# Patient Record
Sex: FEMALE | Race: WHITE | HISPANIC OR LATINO | Employment: OTHER | URBAN - METROPOLITAN AREA
[De-identification: names, ages, dates, MRNs, and addresses within clinical notes are randomized per-mention and may not be internally consistent; named-entity substitution may affect disease eponyms.]

---

## 2017-01-27 ENCOUNTER — PREP FOR SURGERY (OUTPATIENT)
Dept: ORTHOPEDIC SURGERY | Facility: CLINIC | Age: 57
End: 2017-01-27

## 2017-01-27 DIAGNOSIS — Z96.649 FAILED TOTAL HIP ARTHROPLASTY, SEQUELA: Primary | ICD-10-CM

## 2017-01-27 DIAGNOSIS — T84.018S FAILED TOTAL HIP ARTHROPLASTY, SEQUELA: Primary | ICD-10-CM

## 2017-01-27 RX ORDER — CELECOXIB 100 MG/1
400 CAPSULE ORAL ONCE
Status: CANCELLED | OUTPATIENT
Start: 2017-01-27 | End: 2017-01-27

## 2017-01-27 RX ORDER — OXYCODONE HCL 10 MG/1
20 TABLET, FILM COATED, EXTENDED RELEASE ORAL ONCE
Status: CANCELLED | OUTPATIENT
Start: 2017-01-27 | End: 2017-01-27

## 2017-01-27 RX ORDER — ACETAMINOPHEN 10 MG/ML
1000 INJECTION, SOLUTION INTRAVENOUS ONCE
Status: CANCELLED | OUTPATIENT
Start: 2017-03-07

## 2017-01-27 RX ORDER — PREGABALIN 25 MG/1
150 CAPSULE ORAL ONCE
Status: CANCELLED | OUTPATIENT
Start: 2017-01-27 | End: 2017-01-27

## 2017-02-19 ENCOUNTER — PREP FOR SURGERY (OUTPATIENT)
Dept: ORTHOPEDIC SURGERY | Facility: CLINIC | Age: 57
End: 2017-02-19

## 2017-02-19 DIAGNOSIS — Z96.649 FAILED TOTAL HIP ARTHROPLASTY, INITIAL ENCOUNTER (HCC): Primary | ICD-10-CM

## 2017-02-19 DIAGNOSIS — T84.018A FAILED TOTAL HIP ARTHROPLASTY, INITIAL ENCOUNTER (HCC): Primary | ICD-10-CM

## 2017-02-19 RX ORDER — PANTOPRAZOLE SODIUM 20 MG/1
40 TABLET, DELAYED RELEASE ORAL ONCE
Status: CANCELLED | OUTPATIENT
Start: 2017-03-07

## 2017-02-19 RX ORDER — ACETAMINOPHEN 10 MG/ML
1000 INJECTION, SOLUTION INTRAVENOUS ONCE
Status: CANCELLED | OUTPATIENT
Start: 2017-03-07

## 2017-02-19 RX ORDER — CELECOXIB 100 MG/1
200 CAPSULE ORAL ONCE
Status: CANCELLED | OUTPATIENT
Start: 2017-03-07

## 2017-02-19 RX ORDER — PREGABALIN 25 MG/1
150 CAPSULE ORAL ONCE
Status: CANCELLED | OUTPATIENT
Start: 2017-02-19 | End: 2017-02-19

## 2017-02-19 RX ORDER — OXYCODONE HCL 10 MG/1
10 TABLET, FILM COATED, EXTENDED RELEASE ORAL ONCE
Status: CANCELLED | OUTPATIENT
Start: 2017-02-19 | End: 2017-02-19

## 2017-02-20 ENCOUNTER — PREP FOR SURGERY (OUTPATIENT)
Dept: ORTHOPEDIC SURGERY | Facility: CLINIC | Age: 57
End: 2017-02-20

## 2017-02-20 DIAGNOSIS — Z96.649 FAILED TOTAL HIP ARTHROPLASTY WITH DISLOCATION, INITIAL ENCOUNTER (HCC): Primary | ICD-10-CM

## 2017-02-20 DIAGNOSIS — T84.028A FAILED TOTAL HIP ARTHROPLASTY WITH DISLOCATION, INITIAL ENCOUNTER (HCC): Primary | ICD-10-CM

## 2017-02-28 RX ORDER — POTASSIUM CHLORIDE 20 MEQ/1
20 TABLET, EXTENDED RELEASE ORAL 2 TIMES DAILY
COMMUNITY

## 2017-02-28 RX ORDER — BUSPIRONE HYDROCHLORIDE 5 MG/1
5 TABLET ORAL 3 TIMES DAILY
COMMUNITY
End: 2020-02-12 | Stop reason: ALTCHOICE

## 2017-02-28 RX ORDER — ERGOCALCIFEROL 1.25 MG/1
50000 CAPSULE ORAL
COMMUNITY

## 2017-02-28 RX ORDER — OMEPRAZOLE 20 MG/1
20 CAPSULE, DELAYED RELEASE ORAL DAILY
COMMUNITY

## 2017-03-06 ENCOUNTER — PREP FOR SURGERY (OUTPATIENT)
Dept: ORTHOPEDIC SURGERY | Facility: CLINIC | Age: 57
End: 2017-03-06

## 2017-03-06 ENCOUNTER — OFFICE VISIT (OUTPATIENT)
Dept: ORTHOPEDIC SURGERY | Facility: CLINIC | Age: 57
End: 2017-03-06

## 2017-03-06 DIAGNOSIS — Z96.649 FAILED TOTAL HIP ARTHROPLASTY, SEQUELA: Primary | ICD-10-CM

## 2017-03-06 DIAGNOSIS — Z96.649 FAILED TOTAL HIP ARTHROPLASTY WITH DISLOCATION, INITIAL ENCOUNTER (HCC): Primary | ICD-10-CM

## 2017-03-06 DIAGNOSIS — T84.018S FAILED TOTAL HIP ARTHROPLASTY, SEQUELA: Primary | ICD-10-CM

## 2017-03-06 DIAGNOSIS — T84.028A FAILED TOTAL HIP ARTHROPLASTY WITH DISLOCATION, INITIAL ENCOUNTER (HCC): Primary | ICD-10-CM

## 2017-03-06 PROCEDURE — S0260 H&P FOR SURGERY: HCPCS | Performed by: ORTHOPAEDIC SURGERY

## 2017-03-06 NOTE — PROGRESS NOTES
Subjective: Left hip pain     Patient ID: Mariangel Thomas is a 56 y.o. female.    Chief Complaint:    History of Present Illness patient seen today to undergo revision of a left total hip arthroplasty tomorrow       Social History     Occupational History   • Not on file.     Social History Main Topics   • Smoking status: Never Smoker   • Smokeless tobacco: Never Used   • Alcohol use No   • Drug use: No   • Sexual activity: Defer      Review of Systems      Past Medical History   Diagnosis Date   • Anxiety    • Chronic respiratory failure    • COPD (chronic obstructive pulmonary disease)    • Disc disorder      lumbar/thoracic   • Fracture of hip      sched total hip revision   • GERD (gastroesophageal reflux disease)    • Kidney failure    • Lumbosacral disc disease    • OA (osteoarthritis)    • Psychosis    • Severe sepsis without septic shock      Past Surgical History   Procedure Laterality Date   • Total hip arthroplasty Left 12/2015     No family history on file.      Objective:  There were no vitals filed for this visit.  There were no vitals filed for this visit.  There is no height or weight on file to calculate BMI.       Ortho Exam  H&P completed    Assessment:       1. Failed total hip arthroplasty with dislocation, initial encounter          Plan:      all questions regarding the surgery answered      Work Status:    ITZEL query complete.    Orders:  No orders of the defined types were placed in this encounter.      Medications:  No orders of the defined types were placed in this encounter.      Followup:  Return in about 2 weeks (around 3/20/2017).          Dragon transcription disclaimer     Much of this encounter note is an electronic transcription/translation of spoken language to printed text. The electronic translation of spoken language may permit erroneous, or at times, nonsensical words or phrases to be inadvertently transcribed. Although I have reviewed the note for such errors, some may still  exist.

## 2017-03-06 NOTE — H&P
Orthopedic Surgery    Patient Care Team:  Antoine Talavera Jr., MD as PCP - General  Antoine Talavera Jr., MD as PCP - Family Medicine    CHIEF COMPLAINT: Left hip pain    HISTORY OF PRESENT ILLNESS: 56-year-old female presented at this time to undergo revision of a left total hip arthroplasty.  She underwent a primary total hip arthroplasty in December 2015 the gone onto develop a hip protrusio and is being admitted this time to undergo revision of her acetabulum with a custom acetabular cage.  Reviewed with the patient the risk of surgery which include but not limited to an infection and the need for multiple procedures including implant removal to eradicate infection, vascular injury, nerve injury, repeat protrusio, the need for revision surgery, limb length discrepancy and persistent pain and discomfort, hip dislocation and pain she understands and agrees to proceed.  His been seen and cleared by PCP for the said surgery.          Past Medical History   Diagnosis Date   • Anxiety    • Chronic respiratory failure    • COPD (chronic obstructive pulmonary disease)    • Disc disorder      lumbar/thoracic   • Fracture of hip      sched total hip revision   • GERD (gastroesophageal reflux disease)    • Kidney failure    • Lumbosacral disc disease    • OA (osteoarthritis)    • Psychosis    • Severe sepsis without septic shock      Past Surgical History   Procedure Laterality Date   • Total hip arthroplasty Left 12/2015     History reviewed. No pertinent family history.  Social History   Substance Use Topics   • Smoking status: Never Smoker   • Smokeless tobacco: Never Used   • Alcohol use No     No prescriptions prior to admission.     Allergies:  Aleve [naproxen sodium]; Amoxicillin; Bactrim [sulfamethoxazole-trimethoprim]; Keflex [cephalexin]; Lamisil [terbinafine hcl]; Levaquin [levofloxacin in d5w]; and Penicillins    REVIEW OF SYSTEMS:  Please see the above history of present illness for pertinent positives and  "negatives.  The remainder of the patient's systems have been reviewed and are negative.    Vital Signs       Flowsheet Rows         First Filed Value    Admission Height  67\" (170.2 cm) Documented at 03/06/2017 1020    Admission Weight  180 lb (81.6 kg) Documented at 03/06/2017 1020           Physical Exam:  Physical Exam   Constitutional: Patient appears well-developed and well-nourished and in no acute distress   HEENT:   Head: Normocephalic and atraumatic.   Eyes:  Pupils are equal, round, and reactive to light.  Mouth and Throat: Patient has moist mucous membranes. Oropharynx is clear of any erythema or exudate.     Neck: Neck supple. No JVD present. No thyromegaly present. No lymphadenopathy present.  Cardiovascular: Regular rate, regular rhythm.  Pulmonary/Chest: Lungs are clear to auscultation bilaterally.  Abdominal:benign,soft with bowel sounds  Musculoskeletal: Normal posture.  Extremities: There is no motor deficit good distal pulses low blood extremity.  There is shortening of that hip.  There is pain with passive range of motion.    Neurological: Patient is alert and oriented.  Psychological:   Mood and behavior appropriate.  Skin: Skin is warm and dry.     Results Review:    I reviewed the patient's new clinical results.  Lab Results (most recent)     None          Imaging Results (most recent)     None            ECG/EMG Results (most recent)     None          Assessment/Plan  x-ray show protrusio of the implant on the left hip        I discussed the patients findings and my recommendations with patient and revision left total hip arthroplasty    Piter Chavez MD  03/06/17  3:53 PM          "

## 2017-03-07 ENCOUNTER — HOSPITAL ENCOUNTER (OUTPATIENT)
Facility: HOSPITAL | Age: 57
Setting detail: HOSPITAL OUTPATIENT SURGERY
Discharge: NURSING, REHAB OR LONG-TERM CARE | End: 2017-03-07
Attending: ORTHOPAEDIC SURGERY | Admitting: HOSPITALIST

## 2017-03-07 VITALS
OXYGEN SATURATION: 88 % | WEIGHT: 210.6 LBS | BODY MASS INDEX: 33.06 KG/M2 | TEMPERATURE: 98.7 F | HEART RATE: 77 BPM | RESPIRATION RATE: 21 BRPM | SYSTOLIC BLOOD PRESSURE: 122 MMHG | DIASTOLIC BLOOD PRESSURE: 86 MMHG | HEIGHT: 67 IN

## 2017-03-07 DIAGNOSIS — T84.028A FAILED TOTAL HIP ARTHROPLASTY WITH DISLOCATION, INITIAL ENCOUNTER (HCC): ICD-10-CM

## 2017-03-07 DIAGNOSIS — T84.018S FAILED TOTAL HIP ARTHROPLASTY, SEQUELA: ICD-10-CM

## 2017-03-07 DIAGNOSIS — Z96.649 FAILED TOTAL HIP ARTHROPLASTY, INITIAL ENCOUNTER (HCC): ICD-10-CM

## 2017-03-07 DIAGNOSIS — T84.018A FAILED TOTAL HIP ARTHROPLASTY, INITIAL ENCOUNTER (HCC): ICD-10-CM

## 2017-03-07 DIAGNOSIS — Z96.649 FAILED TOTAL HIP ARTHROPLASTY WITH DISLOCATION, INITIAL ENCOUNTER (HCC): ICD-10-CM

## 2017-03-07 DIAGNOSIS — Z96.649 FAILED TOTAL HIP ARTHROPLASTY, SEQUELA: ICD-10-CM

## 2017-03-07 LAB
ABO GROUP BLD: NORMAL
ARTERIAL PATENCY WRIST A: POSITIVE
ATMOSPHERIC PRESS: 749.3 MMHG
BASE EXCESS BLDA CALC-SCNC: 3.7 MMOL/L (ref -2.3–2.3)
BDY SITE: ABNORMAL
BLD GP AB SCN SERPL QL: NEGATIVE
HCO3 BLDA-SCNC: 29.3 MMOL/L (ref 22–26)
HGB BLDA-MCNC: 12.1 G/DL (ref 12–18)
MODALITY: ABNORMAL
PCO2 BLDA: 48.7 MM HG (ref 35–45)
PH BLDA: 7.4 PH UNITS (ref 7.35–7.45)
PO2 BLDA: 60.7 MM HG (ref 80–100)
POTASSIUM BLD-SCNC: 4.6 MMOL/L (ref 3.5–5.2)
PULSE OX: 88 %
RH BLD: POSITIVE
SAO2 % BLDCOA: 90.7 % (ref 91–100)
TOTAL RATE: 21 BREATHS/MINUTE

## 2017-03-07 PROCEDURE — 84132 ASSAY OF SERUM POTASSIUM: CPT | Performed by: ORTHOPAEDIC SURGERY

## 2017-03-07 PROCEDURE — 25010000002 VANCOMYCIN 750 MG RECONSTITUTED SOLUTION 1 EACH VIAL: Performed by: ORTHOPAEDIC SURGERY

## 2017-03-07 PROCEDURE — 86850 RBC ANTIBODY SCREEN: CPT | Performed by: ORTHOPAEDIC SURGERY

## 2017-03-07 PROCEDURE — 82803 BLOOD GASES ANY COMBINATION: CPT | Performed by: NURSE ANESTHETIST, CERTIFIED REGISTERED

## 2017-03-07 PROCEDURE — 36600 WITHDRAWAL OF ARTERIAL BLOOD: CPT | Performed by: NURSE ANESTHETIST, CERTIFIED REGISTERED

## 2017-03-07 PROCEDURE — 86900 BLOOD TYPING SEROLOGIC ABO: CPT | Performed by: ORTHOPAEDIC SURGERY

## 2017-03-07 PROCEDURE — 94640 AIRWAY INHALATION TREATMENT: CPT

## 2017-03-07 PROCEDURE — 86901 BLOOD TYPING SEROLOGIC RH(D): CPT | Performed by: ORTHOPAEDIC SURGERY

## 2017-03-07 RX ORDER — MIDAZOLAM HYDROCHLORIDE 1 MG/ML
1 INJECTION INTRAMUSCULAR; INTRAVENOUS
Status: DISCONTINUED | OUTPATIENT
Start: 2017-03-07 | End: 2017-03-07 | Stop reason: HOSPADM

## 2017-03-07 RX ORDER — MIDAZOLAM HYDROCHLORIDE 1 MG/ML
2 INJECTION INTRAMUSCULAR; INTRAVENOUS
Status: DISCONTINUED | OUTPATIENT
Start: 2017-03-07 | End: 2017-03-07 | Stop reason: HOSPADM

## 2017-03-07 RX ORDER — ONDANSETRON 2 MG/ML
4 INJECTION INTRAMUSCULAR; INTRAVENOUS ONCE AS NEEDED
Status: DISCONTINUED | OUTPATIENT
Start: 2017-03-07 | End: 2017-03-07 | Stop reason: HOSPADM

## 2017-03-07 RX ORDER — PREGABALIN 75 MG/1
CAPSULE ORAL
Status: COMPLETED
Start: 2017-03-07 | End: 2017-03-07

## 2017-03-07 RX ORDER — FAMOTIDINE 10 MG/ML
20 INJECTION, SOLUTION INTRAVENOUS
Status: DISCONTINUED | OUTPATIENT
Start: 2017-03-07 | End: 2017-03-07 | Stop reason: HOSPADM

## 2017-03-07 RX ORDER — PREGABALIN 25 MG/1
150 CAPSULE ORAL ONCE
Status: COMPLETED | OUTPATIENT
Start: 2017-03-07 | End: 2017-03-07

## 2017-03-07 RX ORDER — SODIUM CHLORIDE 0.9 % (FLUSH) 0.9 %
1-10 SYRINGE (ML) INJECTION AS NEEDED
Status: DISCONTINUED | OUTPATIENT
Start: 2017-03-07 | End: 2017-03-07 | Stop reason: HOSPADM

## 2017-03-07 RX ORDER — IPRATROPIUM BROMIDE AND ALBUTEROL SULFATE 2.5; .5 MG/3ML; MG/3ML
3 SOLUTION RESPIRATORY (INHALATION) ONCE
Status: COMPLETED | OUTPATIENT
Start: 2017-03-07 | End: 2017-03-07

## 2017-03-07 RX ORDER — CELECOXIB 100 MG/1
400 CAPSULE ORAL ONCE
Status: COMPLETED | OUTPATIENT
Start: 2017-03-07 | End: 2017-03-07

## 2017-03-07 RX ORDER — SODIUM CHLORIDE, SODIUM LACTATE, POTASSIUM CHLORIDE, CALCIUM CHLORIDE 600; 310; 30; 20 MG/100ML; MG/100ML; MG/100ML; MG/100ML
9 INJECTION, SOLUTION INTRAVENOUS CONTINUOUS
Status: DISCONTINUED | OUTPATIENT
Start: 2017-03-07 | End: 2017-03-07 | Stop reason: HOSPADM

## 2017-03-07 RX ORDER — OXYCODONE HCL 10 MG/1
20 TABLET, FILM COATED, EXTENDED RELEASE ORAL ONCE
Status: COMPLETED | OUTPATIENT
Start: 2017-03-07 | End: 2017-03-07

## 2017-03-07 RX ORDER — IPRATROPIUM BROMIDE AND ALBUTEROL SULFATE 2.5; .5 MG/3ML; MG/3ML
SOLUTION RESPIRATORY (INHALATION)
Status: COMPLETED
Start: 2017-03-07 | End: 2017-03-07

## 2017-03-07 RX ORDER — LIDOCAINE HYDROCHLORIDE 10 MG/ML
0.3 INJECTION, SOLUTION EPIDURAL; INFILTRATION; INTRACAUDAL; PERINEURAL ONCE
Status: DISCONTINUED | OUTPATIENT
Start: 2017-03-07 | End: 2017-03-07 | Stop reason: HOSPADM

## 2017-03-07 RX ORDER — SODIUM CHLORIDE 9 MG/ML
INJECTION, SOLUTION INTRAVENOUS
Status: DISCONTINUED
Start: 2017-03-07 | End: 2017-03-07 | Stop reason: HOSPADM

## 2017-03-07 RX ORDER — ACETAMINOPHEN 10 MG/ML
1000 INJECTION, SOLUTION INTRAVENOUS ONCE
Status: DISCONTINUED | OUTPATIENT
Start: 2017-03-07 | End: 2017-03-07 | Stop reason: HOSPADM

## 2017-03-07 RX ADMIN — IPRATROPIUM BROMIDE AND ALBUTEROL SULFATE 3 ML: .5; 3 SOLUTION RESPIRATORY (INHALATION) at 08:32

## 2017-03-07 RX ADMIN — PREGABALIN 150 MG: 75 CAPSULE ORAL at 07:53

## 2017-03-07 RX ADMIN — CELECOXIB 400 MG: 100 CAPSULE ORAL at 07:54

## 2017-03-07 RX ADMIN — OXYCODONE HYDROCHLORIDE 20 MG: 10 TABLET, FILM COATED, EXTENDED RELEASE ORAL at 07:53

## 2017-03-07 RX ADMIN — PREGABALIN 150 MG: 25 CAPSULE ORAL at 07:53

## 2017-03-07 RX ADMIN — SODIUM CHLORIDE 1500 MG: 900 INJECTION, SOLUTION INTRAVENOUS at 07:56

## 2017-03-07 RX ADMIN — IPRATROPIUM BROMIDE AND ALBUTEROL SULFATE 3 ML: 2.5; .5 SOLUTION RESPIRATORY (INHALATION) at 08:32

## 2017-03-07 NOTE — PLAN OF CARE
Problem: Patient Care Overview (Adult)  Goal: Adult Individualization and Mutuality  Outcome: Ongoing (interventions implemented as appropriate)    03/07/17 6468   Individualization   Patient Specific Preferences goes by amina

## 2017-03-07 NOTE — PLAN OF CARE
Problem: Patient Care Overview (Adult)  Goal: Plan of Care Review  Outcome: Ongoing (interventions implemented as appropriate)    03/07/17 0724   Coping/Psychosocial Response Interventions   Plan Of Care Reviewed With patient   Patient Care Overview   Progress no change   Outcome Evaluation   Outcome Summary/Follow up Plan vss, waiting for procedure

## 2017-03-07 NOTE — PLAN OF CARE
Problem: Perioperative Period (Adult)  Goal: Signs and Symptoms of Listed Potential Problems Will be Absent or Manageable (Perioperative Period)  Outcome: Ongoing (interventions implemented as appropriate)    03/07/17 0775   Perioperative Period   Problems Assessed (Perioperative Period) all   Problems Present (Perioperative Period) pain

## 2017-03-07 NOTE — PERIOPERATIVE NURSING NOTE
"Patient cancelled in pre op due to poor pulmonary status. Report called to Mai RN at [facility] in [place]. Call placed to [facility] requesting transport services, spoke with Ed who said he would \"let them know.\" Picked up by [facility] at 1020 for transport back to [facility] in [place].  "

## 2017-03-29 ENCOUNTER — PREP FOR SURGERY (OUTPATIENT)
Dept: ORTHOPEDIC SURGERY | Facility: CLINIC | Age: 57
End: 2017-03-29

## 2017-03-29 DIAGNOSIS — M24.7 ACETABULAR PROTRUSION: Primary | ICD-10-CM

## 2017-03-29 RX ORDER — PREGABALIN 75 MG/1
150 CAPSULE ORAL ONCE
Status: CANCELLED | OUTPATIENT
Start: 2017-04-11

## 2017-03-29 RX ORDER — VANCOMYCIN HCL-SODIUM CHLORIDE IV SOLN 1.5 GM/250ML-0.9% 1.5-0.9/25 GM/ML-%
15 SOLUTION INTRAVENOUS ONCE
Status: CANCELLED | OUTPATIENT
Start: 2017-04-11

## 2017-03-29 RX ORDER — ACETAMINOPHEN 10 MG/ML
1000 INJECTION, SOLUTION INTRAVENOUS ONCE
Status: CANCELLED | OUTPATIENT
Start: 2017-04-11

## 2017-03-29 RX ORDER — OXYCODONE HCL 10 MG/1
10 TABLET, FILM COATED, EXTENDED RELEASE ORAL ONCE
Status: CANCELLED | OUTPATIENT
Start: 2017-03-29 | End: 2017-03-29

## 2017-03-29 RX ORDER — PANTOPRAZOLE SODIUM 20 MG/1
20 TABLET, DELAYED RELEASE ORAL ONCE
Status: CANCELLED | OUTPATIENT
Start: 2017-04-11

## 2017-04-09 ENCOUNTER — ANESTHESIA EVENT (OUTPATIENT)
Dept: PERIOP | Facility: HOSPITAL | Age: 57
End: 2017-04-09

## 2017-04-10 ENCOUNTER — PREP FOR SURGERY (OUTPATIENT)
Dept: ORTHOPEDIC SURGERY | Facility: CLINIC | Age: 57
End: 2017-04-10

## 2017-04-10 ENCOUNTER — OFFICE VISIT (OUTPATIENT)
Dept: ORTHOPEDIC SURGERY | Facility: CLINIC | Age: 57
End: 2017-04-10

## 2017-04-10 DIAGNOSIS — M24.7 ACETABULAR PROTRUSION: Primary | ICD-10-CM

## 2017-04-10 DIAGNOSIS — T84.018S FAILED TOTAL HIP ARTHROPLASTY, SEQUELA: ICD-10-CM

## 2017-04-10 DIAGNOSIS — Z96.649 FAILED TOTAL HIP ARTHROPLASTY, SEQUELA: ICD-10-CM

## 2017-04-10 PROCEDURE — S0260 H&P FOR SURGERY: HCPCS | Performed by: ORTHOPAEDIC SURGERY

## 2017-04-10 NOTE — H&P
Orthopedic Surgery    Patient Care Team:  Antoine Talavera Jr., MD as PCP - General  Antoine Talavera Jr., MD as PCP - Family Medicine  CHIEF COMPLAINT: Failed left total hip, left hip pain    HISTORY OF PRESENT ILLNESS: 56-year-old female is 16 months status post a left total hip arthroplasty gone onto develop protrusio of the acetabulum being admitted this time to undergo left total hip revision with reconstruction of the left acetabulum with the custom implant.  Subsequent x-rays show significant protrusio of the acetabular component into the pelvis.  I have discussed with the patient in detail the risk and benefits of surgery which include but not limited to infection and the need for multiple procedures including implant removal to eradicate the correction, nerve injury, vascular injury, foot drop, periprosthetic fracture, limb length discrepancy, the need for revision surgery in the future once again and persistent pain and discomfort despite a well implanted total hip.  She understands these risk and agrees to proceed.  Has been seen from a pulmonary standpoint for the surgery.          Past Medical History:   Diagnosis Date   • Anxiety    • Chronic respiratory failure    • COPD (chronic obstructive pulmonary disease)    • Disc disorder     lumbar/thoracic   • Fracture of hip     sched total hip revision   • GERD (gastroesophageal reflux disease)    • Incontinent of urine     incontinent of bowel & bladder   • Insomnia    • Kidney failure    • Lumbosacral disc disease    • Muscle weakness     generalized   • Neuropathy    • OA (osteoarthritis)    • Psychosis    • Severe sepsis without septic shock      Past Surgical History:   Procedure Laterality Date   •  SECTION     • CHOLECYSTECTOMY     • TOTAL HIP ARTHROPLASTY Left 2015   • TUBAL ABDOMINAL LIGATION       History reviewed. No pertinent family history.  Social History   Substance Use Topics   • Smoking status: Current Every Day Smoker     Packs/day:  "0.50     Years: 20.00     Types: Cigarettes   • Smokeless tobacco: Never Used   • Alcohol use No     No prescriptions prior to admission.     Allergies:  Aleve [naproxen sodium]; Amoxicillin; Bactrim [sulfamethoxazole-trimethoprim]; Keflex [cephalexin]; Lamisil [terbinafine hcl]; Levaquin [levofloxacin in d5w]; and Penicillins    REVIEW OF SYSTEMS:  Please see the above history of present illness for pertinent positives and negatives.  The remainder of the patient's systems have been reviewed and are negative.    Vital Signs       Flowsheet Rows         First Filed Value    Admission Height  67\" (170.2 cm) Documented at 04/05/2017 1602    Admission Weight  180 lb (81.6 kg) Documented at 04/05/2017 1602           Physical Exam:  Physical Exam   Constitutional: Patient appears well-developed and well-nourished and in no acute distress   HEENT:   Head: Normocephalic and atraumatic.   Eyes:  Pupils are equal, round, and reactive to light.  Mouth and Throat: Patient has moist mucous membranes. Oropharynx is clear of any erythema or exudate.     Neck: Neck supple. No JVD present. No thyromegaly present. No lymphadenopathy present.  Cardiovascular: Regular rate, regular rhythm.  Pulmonary/Chest: Lungs are clear to auscultation bilaterally.  Abdominal:benign,soft with bowel sounds  Musculoskeletal: Normal posture.  Extremities: There is no motor deficit good distal pulses of the left lower extremity.  No sensory loss.  Calf is supple.  She does have pain with passive internal/external rotation and she tends to hold the left leg and knee flexed to decrease pain and discomfort.  There is no swelling noted.  The skin is cool to touch.  Her previous wound is completely benign.    Neurological: Patient is alert and oriented.  Psychological:   Mood and behavior appropriate.  Skin: Skin is warm and dry.     Results Review:    I reviewed the patient's new clinical results.  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        I discussed the patients findings and my recommendations with patient and plan to proceed with revision of her left acetabulum with the custom acetabular component.    Piter Chavez MD  04/10/17  12:31 PM

## 2017-04-10 NOTE — PROGRESS NOTES
Subjective: Failed left total hip     Patient ID: Mariangel Thomas is a 56 y.o. female.    Chief Complaint:    History of Present Illness patient seen today for surgery tomorrow to undergo revision of a left acetabular protrusio.  Has been seen and cleared for surgery from pulmonary medicine.       Social History     Occupational History   • Not on file.     Social History Main Topics   • Smoking status: Current Every Day Smoker     Packs/day: 0.50     Years: 20.00     Types: Cigarettes   • Smokeless tobacco: Never Used   • Alcohol use No   • Drug use: No   • Sexual activity: Defer      Review of Systems   Constitutional: Negative for chills, diaphoresis, fever and unexpected weight change.   HENT: Negative for hearing loss, nosebleeds, sore throat and tinnitus.    Eyes: Negative for pain and visual disturbance.   Respiratory: Negative for cough, shortness of breath and wheezing.    Cardiovascular: Negative for chest pain and palpitations.   Gastrointestinal: Negative for abdominal pain, diarrhea, nausea and vomiting.   Endocrine: Negative for cold intolerance, heat intolerance and polydipsia.   Genitourinary: Negative for difficulty urinating, dysuria and hematuria.   Musculoskeletal: Negative for arthralgias, joint swelling and myalgias.   Skin: Negative for rash and wound.   Allergic/Immunologic: Negative for environmental allergies.   Neurological: Negative for dizziness, syncope and numbness.   Hematological: Does not bruise/bleed easily.   Psychiatric/Behavioral: Negative for dysphoric mood and sleep disturbance. The patient is not nervous/anxious.    All other systems reviewed and are negative.        Past Medical History:   Diagnosis Date   • Anxiety    • Chronic respiratory failure    • COPD (chronic obstructive pulmonary disease)    • Disc disorder     lumbar/thoracic   • Fracture of hip     sched total hip revision   • GERD (gastroesophageal reflux disease)    • Incontinent of urine     incontinent of bowel &  bladder   • Insomnia    • Kidney failure    • Lumbosacral disc disease    • Muscle weakness     generalized   • Neuropathy    • OA (osteoarthritis)    • Psychosis    • Severe sepsis without septic shock      Past Surgical History:   Procedure Laterality Date   •  SECTION     • CHOLECYSTECTOMY     • TOTAL HIP ARTHROPLASTY Left 2015   • TUBAL ABDOMINAL LIGATION       No family history on file.      Objective:  There were no vitals filed for this visit.  There were no vitals filed for this visit.  There is no height or weight on file to calculate BMI.       Ortho Exam  H&P completed.    Assessment:       1. Acetabular protrusion    2. Failed total hip arthroplasty, sequela          Plan:        All questions regarding the surgery answered.    Work Status:    Markkit query complete.    Orders:  No orders of the defined types were placed in this encounter.      Medications:  No orders of the defined types were placed in this encounter.      Followup:  Return in about 2 weeks (around 2017).          Dragon transcription disclaimer     Much of this encounter note is an electronic transcription/translation of spoken language to printed text. The electronic translation of spoken language may permit erroneous, or at times, nonsensical words or phrases to be inadvertently transcribed. Although I have reviewed the note for such errors, some may still exist.

## 2017-04-11 ENCOUNTER — ANESTHESIA (OUTPATIENT)
Dept: PERIOP | Facility: HOSPITAL | Age: 57
End: 2017-04-11

## 2017-04-11 ENCOUNTER — HOSPITAL ENCOUNTER (INPATIENT)
Facility: HOSPITAL | Age: 57
LOS: 3 days | Discharge: NURSING, REHAB OR LONG-TERM CARE | End: 2017-04-14
Attending: ORTHOPAEDIC SURGERY | Admitting: HOSPITALIST

## 2017-04-11 ENCOUNTER — APPOINTMENT (OUTPATIENT)
Dept: GENERAL RADIOLOGY | Facility: HOSPITAL | Age: 57
End: 2017-04-11

## 2017-04-11 DIAGNOSIS — M24.7 ACETABULAR PROTRUSION: ICD-10-CM

## 2017-04-11 LAB
ABO GROUP BLD: NORMAL
BLD GP AB SCN SERPL QL: NEGATIVE
DEPRECATED RDW RBC AUTO: 48.9 FL (ref 37–54)
ERYTHROCYTE [DISTWIDTH] IN BLOOD BY AUTOMATED COUNT: 16.6 % (ref 11.5–14.5)
HCT VFR BLD AUTO: 38.6 % (ref 37–47)
HGB BLD-MCNC: 11.5 G/DL (ref 12–16)
MCH RBC QN AUTO: 24.4 PG (ref 27–31)
MCHC RBC AUTO-ENTMCNC: 29.8 G/DL (ref 31–37)
MCV RBC AUTO: 82 FL (ref 81–99)
PLATELET # BLD AUTO: 186 10*3/MM3 (ref 140–500)
PMV BLD AUTO: 12.1 FL (ref 7.4–10.4)
POTASSIUM BLD-SCNC: 4.3 MMOL/L (ref 3.5–5.2)
RBC # BLD AUTO: 4.71 10*6/MM3 (ref 4.2–5.4)
RH BLD: POSITIVE
WBC NRBC COR # BLD: 7.58 10*3/MM3 (ref 4.8–10.8)

## 2017-04-11 PROCEDURE — C1776 JOINT DEVICE (IMPLANTABLE): HCPCS | Performed by: ORTHOPAEDIC SURGERY

## 2017-04-11 PROCEDURE — 25010000002 MIDAZOLAM PER 1 MG: Performed by: NURSE ANESTHETIST, CERTIFIED REGISTERED

## 2017-04-11 PROCEDURE — 87070 CULTURE OTHR SPECIMN AEROBIC: CPT | Performed by: ORTHOPAEDIC SURGERY

## 2017-04-11 PROCEDURE — C1713 ANCHOR/SCREW BN/BN,TIS/BN: HCPCS | Performed by: ORTHOPAEDIC SURGERY

## 2017-04-11 PROCEDURE — 25010000002 VANCOMYCIN PER 500 MG: Performed by: ORTHOPAEDIC SURGERY

## 2017-04-11 PROCEDURE — 25010000002 VANCOMYCIN 1500 MG/250ML SOLUTION: Performed by: ORTHOPAEDIC SURGERY

## 2017-04-11 PROCEDURE — 87205 SMEAR GRAM STAIN: CPT | Performed by: ORTHOPAEDIC SURGERY

## 2017-04-11 PROCEDURE — 94799 UNLISTED PULMONARY SVC/PX: CPT

## 2017-04-11 PROCEDURE — 93010 ELECTROCARDIOGRAM REPORT: CPT | Performed by: INTERNAL MEDICINE

## 2017-04-11 PROCEDURE — 25010000002 PROPOFOL 10 MG/ML EMULSION: Performed by: NURSE ANESTHETIST, CERTIFIED REGISTERED

## 2017-04-11 PROCEDURE — 25010000002 VANCOMYCIN PER 500 MG

## 2017-04-11 PROCEDURE — 94640 AIRWAY INHALATION TREATMENT: CPT

## 2017-04-11 PROCEDURE — 73501 X-RAY EXAM HIP UNI 1 VIEW: CPT

## 2017-04-11 PROCEDURE — 25010000002 ONDANSETRON PER 1 MG: Performed by: NURSE ANESTHETIST, CERTIFIED REGISTERED

## 2017-04-11 PROCEDURE — 0SWB0JZ REVISION OF SYNTHETIC SUBSTITUTE IN LEFT HIP JOINT, OPEN APPROACH: ICD-10-PCS | Performed by: ORTHOPAEDIC SURGERY

## 2017-04-11 PROCEDURE — 86850 RBC ANTIBODY SCREEN: CPT | Performed by: ORTHOPAEDIC SURGERY

## 2017-04-11 PROCEDURE — 73502 X-RAY EXAM HIP UNI 2-3 VIEWS: CPT

## 2017-04-11 PROCEDURE — 27137 REVISE HIP JOINT REPLACEMENT: CPT | Performed by: ORTHOPAEDIC SURGERY

## 2017-04-11 PROCEDURE — 25010000002 HYDROMORPHONE PER 4 MG

## 2017-04-11 PROCEDURE — 86901 BLOOD TYPING SEROLOGIC RH(D): CPT | Performed by: ORTHOPAEDIC SURGERY

## 2017-04-11 PROCEDURE — 84132 ASSAY OF SERUM POTASSIUM: CPT | Performed by: NURSE ANESTHETIST, CERTIFIED REGISTERED

## 2017-04-11 PROCEDURE — 93005 ELECTROCARDIOGRAM TRACING: CPT | Performed by: NURSE ANESTHETIST, CERTIFIED REGISTERED

## 2017-04-11 PROCEDURE — 25010000002 HYDROMORPHONE PER 4 MG: Performed by: ORTHOPAEDIC SURGERY

## 2017-04-11 PROCEDURE — 87075 CULTR BACTERIA EXCEPT BLOOD: CPT | Performed by: ORTHOPAEDIC SURGERY

## 2017-04-11 PROCEDURE — 99222 1ST HOSP IP/OBS MODERATE 55: CPT | Performed by: INTERNAL MEDICINE

## 2017-04-11 PROCEDURE — 85027 COMPLETE CBC AUTOMATED: CPT | Performed by: ORTHOPAEDIC SURGERY

## 2017-04-11 PROCEDURE — 86900 BLOOD TYPING SEROLOGIC ABO: CPT | Performed by: ORTHOPAEDIC SURGERY

## 2017-04-11 DEVICE — PINNACLE HIP SOLUTIONS ALTRX POLYETHYLENE ACETABULAR LINER NEUTRAL 36MM ID 52MM OD
Type: IMPLANTABLE DEVICE | Site: ACETABULUM | Status: FUNCTIONAL
Brand: PINNACLE ALTRX

## 2017-04-11 DEVICE — IMPLANTABLE DEVICE: Type: IMPLANTABLE DEVICE | Site: ACETABULUM | Status: FUNCTIONAL

## 2017-04-11 DEVICE — SELF TAPPING ROOF PILE SCREW 25MM: Type: IMPLANTABLE DEVICE | Site: ACETABULUM | Status: FUNCTIONAL

## 2017-04-11 DEVICE — PINNACLE TRI-FLANGE ACETABULAR CUP W/ HA (102816.011) FOR CEMENTLESS USE ONLY SIZE: 52MM LEFT
Type: IMPLANTABLE DEVICE | Site: ACETABULUM | Status: FUNCTIONAL
Brand: PINNACLE

## 2017-04-11 RX ORDER — POTASSIUM CHLORIDE 20 MEQ/1
20 TABLET, EXTENDED RELEASE ORAL 2 TIMES DAILY
Status: DISCONTINUED | OUTPATIENT
Start: 2017-04-11 | End: 2017-04-14 | Stop reason: HOSPADM

## 2017-04-11 RX ORDER — MIDAZOLAM HYDROCHLORIDE 1 MG/ML
2 INJECTION INTRAMUSCULAR; INTRAVENOUS
Status: DISCONTINUED | OUTPATIENT
Start: 2017-04-11 | End: 2017-04-11 | Stop reason: HOSPADM

## 2017-04-11 RX ORDER — BUSPIRONE HYDROCHLORIDE 5 MG/1
5 TABLET ORAL 2 TIMES DAILY
Status: DISCONTINUED | OUTPATIENT
Start: 2017-04-11 | End: 2017-04-14 | Stop reason: HOSPADM

## 2017-04-11 RX ORDER — DIPHENHYDRAMINE HCL 25 MG
25 CAPSULE ORAL EVERY 8 HOURS PRN
Status: DISCONTINUED | OUTPATIENT
Start: 2017-04-11 | End: 2017-04-14 | Stop reason: HOSPADM

## 2017-04-11 RX ORDER — GLYCOPYRROLATE 0.2 MG/ML
0.2 INJECTION INTRAMUSCULAR; INTRAVENOUS
Status: DISCONTINUED | OUTPATIENT
Start: 2017-04-11 | End: 2017-04-11 | Stop reason: HOSPADM

## 2017-04-11 RX ORDER — GABAPENTIN 100 MG/1
200 CAPSULE ORAL 2 TIMES DAILY
Status: DISCONTINUED | OUTPATIENT
Start: 2017-04-11 | End: 2017-04-14 | Stop reason: HOSPADM

## 2017-04-11 RX ORDER — OXYCODONE HCL 10 MG/1
10 TABLET, FILM COATED, EXTENDED RELEASE ORAL ONCE
Status: COMPLETED | OUTPATIENT
Start: 2017-04-11 | End: 2017-04-11

## 2017-04-11 RX ORDER — ONDANSETRON 2 MG/ML
4 INJECTION INTRAMUSCULAR; INTRAVENOUS ONCE AS NEEDED
Status: DISCONTINUED | OUTPATIENT
Start: 2017-04-11 | End: 2017-04-14 | Stop reason: HOSPADM

## 2017-04-11 RX ORDER — SODIUM CHLORIDE 9 MG/ML
INJECTION, SOLUTION INTRAVENOUS AS NEEDED
Status: DISCONTINUED | OUTPATIENT
Start: 2017-04-11 | End: 2017-04-11 | Stop reason: HOSPADM

## 2017-04-11 RX ORDER — IPRATROPIUM BROMIDE AND ALBUTEROL SULFATE 2.5; .5 MG/3ML; MG/3ML
3 SOLUTION RESPIRATORY (INHALATION)
Status: DISCONTINUED | OUTPATIENT
Start: 2017-04-11 | End: 2017-04-11

## 2017-04-11 RX ORDER — IPRATROPIUM BROMIDE AND ALBUTEROL SULFATE 2.5; .5 MG/3ML; MG/3ML
3 SOLUTION RESPIRATORY (INHALATION) ONCE
Status: COMPLETED | OUTPATIENT
Start: 2017-04-11 | End: 2017-04-11

## 2017-04-11 RX ORDER — VANCOMYCIN HCL-SODIUM CHLORIDE IV SOLN 1.5 GM/250ML-0.9% 1.5-0.9/25 GM/ML-%
15 SOLUTION INTRAVENOUS ONCE
Status: COMPLETED | OUTPATIENT
Start: 2017-04-11 | End: 2017-04-11

## 2017-04-11 RX ORDER — ACETAMINOPHEN 500 MG
1000 TABLET ORAL EVERY 6 HOURS
Status: DISCONTINUED | OUTPATIENT
Start: 2017-04-11 | End: 2017-04-14 | Stop reason: HOSPADM

## 2017-04-11 RX ORDER — BUPIVACAINE HYDROCHLORIDE 5 MG/ML
INJECTION, SOLUTION EPIDURAL; INTRACAUDAL AS NEEDED
Status: DISCONTINUED | OUTPATIENT
Start: 2017-04-11 | End: 2017-04-11 | Stop reason: SURG

## 2017-04-11 RX ORDER — PROPOFOL 10 MG/ML
VIAL (ML) INTRAVENOUS CONTINUOUS PRN
Status: DISCONTINUED | OUTPATIENT
Start: 2017-04-11 | End: 2017-04-11 | Stop reason: SURG

## 2017-04-11 RX ORDER — SODIUM CHLORIDE, SODIUM LACTATE, POTASSIUM CHLORIDE, CALCIUM CHLORIDE 600; 310; 30; 20 MG/100ML; MG/100ML; MG/100ML; MG/100ML
9 INJECTION, SOLUTION INTRAVENOUS CONTINUOUS
Status: DISCONTINUED | OUTPATIENT
Start: 2017-04-11 | End: 2017-04-11

## 2017-04-11 RX ORDER — SODIUM CHLORIDE 0.9 % (FLUSH) 0.9 %
1-10 SYRINGE (ML) INJECTION AS NEEDED
Status: DISCONTINUED | OUTPATIENT
Start: 2017-04-11 | End: 2017-04-11 | Stop reason: HOSPADM

## 2017-04-11 RX ORDER — VANCOMYCIN HYDROCHLORIDE
15 ONCE
Status: DISCONTINUED | OUTPATIENT
Start: 2017-04-11 | End: 2017-04-11 | Stop reason: CLARIF

## 2017-04-11 RX ORDER — CLINDAMYCIN PHOSPHATE 900 MG/50ML
900 INJECTION INTRAVENOUS EVERY 8 HOURS
Status: COMPLETED | OUTPATIENT
Start: 2017-04-11 | End: 2017-04-12

## 2017-04-11 RX ORDER — PANTOPRAZOLE SODIUM 40 MG/1
40 TABLET, DELAYED RELEASE ORAL
Status: DISCONTINUED | OUTPATIENT
Start: 2017-04-12 | End: 2017-04-14 | Stop reason: HOSPADM

## 2017-04-11 RX ORDER — BISACODYL 10 MG
10 SUPPOSITORY, RECTAL RECTAL DAILY PRN
Status: DISCONTINUED | OUTPATIENT
Start: 2017-04-11 | End: 2017-04-14 | Stop reason: HOSPADM

## 2017-04-11 RX ORDER — CETIRIZINE HYDROCHLORIDE 10 MG/1
5 TABLET ORAL DAILY
Status: DISCONTINUED | OUTPATIENT
Start: 2017-04-11 | End: 2017-04-14 | Stop reason: HOSPADM

## 2017-04-11 RX ORDER — PANTOPRAZOLE SODIUM 20 MG/1
20 TABLET, DELAYED RELEASE ORAL ONCE
Status: DISCONTINUED | OUTPATIENT
Start: 2017-04-11 | End: 2017-04-11 | Stop reason: HOSPADM

## 2017-04-11 RX ORDER — SENNA AND DOCUSATE SODIUM 50; 8.6 MG/1; MG/1
2 TABLET, FILM COATED ORAL 2 TIMES DAILY
Status: DISCONTINUED | OUTPATIENT
Start: 2017-04-11 | End: 2017-04-14 | Stop reason: HOSPADM

## 2017-04-11 RX ORDER — DIPHENHYDRAMINE HYDROCHLORIDE 50 MG/ML
12.5 INJECTION INTRAMUSCULAR; INTRAVENOUS
Status: DISCONTINUED | OUTPATIENT
Start: 2017-04-11 | End: 2017-04-14 | Stop reason: HOSPADM

## 2017-04-11 RX ORDER — CLINDAMYCIN PHOSPHATE 900 MG/50ML
INJECTION INTRAVENOUS
Status: DISPENSED
Start: 2017-04-11 | End: 2017-04-11

## 2017-04-11 RX ORDER — MIDAZOLAM HYDROCHLORIDE 1 MG/ML
1 INJECTION INTRAMUSCULAR; INTRAVENOUS
Status: DISCONTINUED | OUTPATIENT
Start: 2017-04-11 | End: 2017-04-11 | Stop reason: HOSPADM

## 2017-04-11 RX ORDER — LORAZEPAM 0.5 MG/1
0.5 TABLET ORAL EVERY 8 HOURS PRN
Status: DISCONTINUED | OUTPATIENT
Start: 2017-04-11 | End: 2017-04-13

## 2017-04-11 RX ORDER — ONDANSETRON 2 MG/ML
4 INJECTION INTRAMUSCULAR; INTRAVENOUS ONCE AS NEEDED
Status: COMPLETED | OUTPATIENT
Start: 2017-04-11 | End: 2017-04-11

## 2017-04-11 RX ORDER — SODIUM CHLORIDE, SODIUM LACTATE, POTASSIUM CHLORIDE, CALCIUM CHLORIDE 600; 310; 30; 20 MG/100ML; MG/100ML; MG/100ML; MG/100ML
75 INJECTION, SOLUTION INTRAVENOUS CONTINUOUS
Status: DISCONTINUED | OUTPATIENT
Start: 2017-04-11 | End: 2017-04-12

## 2017-04-11 RX ORDER — BACITRACIN ZINC 500 [USP'U]/G
OINTMENT TOPICAL AS NEEDED
Status: DISCONTINUED | OUTPATIENT
Start: 2017-04-11 | End: 2017-04-11 | Stop reason: HOSPADM

## 2017-04-11 RX ORDER — CHOLECALCIFEROL (VITAMIN D3) 125 MCG
2.5 CAPSULE ORAL NIGHTLY
Status: DISCONTINUED | OUTPATIENT
Start: 2017-04-11 | End: 2017-04-14 | Stop reason: HOSPADM

## 2017-04-11 RX ORDER — NALOXONE HCL 0.4 MG/ML
0.4 VIAL (ML) INJECTION AS NEEDED
Status: DISCONTINUED | OUTPATIENT
Start: 2017-04-11 | End: 2017-04-14 | Stop reason: HOSPADM

## 2017-04-11 RX ORDER — LIDOCAINE HYDROCHLORIDE 10 MG/ML
0.3 INJECTION, SOLUTION EPIDURAL; INFILTRATION; INTRACAUDAL; PERINEURAL ONCE
Status: DISCONTINUED | OUTPATIENT
Start: 2017-04-11 | End: 2017-04-11 | Stop reason: HOSPADM

## 2017-04-11 RX ORDER — MAGNESIUM HYDROXIDE 1200 MG/15ML
LIQUID ORAL AS NEEDED
Status: DISCONTINUED | OUTPATIENT
Start: 2017-04-11 | End: 2017-04-11 | Stop reason: HOSPADM

## 2017-04-11 RX ORDER — MIRTAZAPINE 15 MG/1
7.5 TABLET, FILM COATED ORAL NIGHTLY
Status: DISCONTINUED | OUTPATIENT
Start: 2017-04-11 | End: 2017-04-14 | Stop reason: HOSPADM

## 2017-04-11 RX ORDER — BISACODYL 5 MG/1
10 TABLET, DELAYED RELEASE ORAL DAILY PRN
Status: DISCONTINUED | OUTPATIENT
Start: 2017-04-11 | End: 2017-04-14 | Stop reason: HOSPADM

## 2017-04-11 RX ORDER — ONDANSETRON 4 MG/1
4 TABLET, FILM COATED ORAL EVERY 6 HOURS PRN
Status: DISCONTINUED | OUTPATIENT
Start: 2017-04-11 | End: 2017-04-14 | Stop reason: HOSPADM

## 2017-04-11 RX ORDER — VANCOMYCIN HYDROCHLORIDE 500 MG/10ML
INJECTION, POWDER, LYOPHILIZED, FOR SOLUTION INTRAVENOUS
Status: COMPLETED
Start: 2017-04-11 | End: 2017-04-11

## 2017-04-11 RX ORDER — FAMOTIDINE 10 MG/ML
20 INJECTION, SOLUTION INTRAVENOUS
Status: DISCONTINUED | OUTPATIENT
Start: 2017-04-11 | End: 2017-04-11 | Stop reason: HOSPADM

## 2017-04-11 RX ORDER — CITALOPRAM 20 MG/1
20 TABLET ORAL NIGHTLY
Status: DISCONTINUED | OUTPATIENT
Start: 2017-04-11 | End: 2017-04-14 | Stop reason: HOSPADM

## 2017-04-11 RX ORDER — IPRATROPIUM BROMIDE AND ALBUTEROL SULFATE 2.5; .5 MG/3ML; MG/3ML
3 SOLUTION RESPIRATORY (INHALATION)
Status: DISCONTINUED | OUTPATIENT
Start: 2017-04-11 | End: 2017-04-12

## 2017-04-11 RX ORDER — SODIUM CHLORIDE 9 MG/ML
INJECTION, SOLUTION INTRAVENOUS
Status: COMPLETED
Start: 2017-04-11 | End: 2017-04-11

## 2017-04-11 RX ORDER — PREGABALIN 75 MG/1
150 CAPSULE ORAL ONCE
Status: COMPLETED | OUTPATIENT
Start: 2017-04-11 | End: 2017-04-11

## 2017-04-11 RX ORDER — ERGOCALCIFEROL 1.25 MG/1
50000 CAPSULE ORAL
Status: DISCONTINUED | OUTPATIENT
Start: 2017-04-11 | End: 2017-04-14 | Stop reason: HOSPADM

## 2017-04-11 RX ORDER — IPRATROPIUM BROMIDE AND ALBUTEROL SULFATE 2.5; .5 MG/3ML; MG/3ML
3 SOLUTION RESPIRATORY (INHALATION) ONCE AS NEEDED
Status: DISCONTINUED | OUTPATIENT
Start: 2017-04-11 | End: 2017-04-14 | Stop reason: HOSPADM

## 2017-04-11 RX ORDER — OXYCODONE HYDROCHLORIDE 5 MG/1
10 TABLET ORAL EVERY 4 HOURS PRN
Status: DISCONTINUED | OUTPATIENT
Start: 2017-04-11 | End: 2017-04-14 | Stop reason: HOSPADM

## 2017-04-11 RX ORDER — FAMOTIDINE 20 MG/1
20 TABLET, FILM COATED ORAL
Status: DISCONTINUED | OUTPATIENT
Start: 2017-04-11 | End: 2017-04-11 | Stop reason: HOSPADM

## 2017-04-11 RX ORDER — FUROSEMIDE 20 MG/1
20 TABLET ORAL DAILY
Status: DISCONTINUED | OUTPATIENT
Start: 2017-04-11 | End: 2017-04-14 | Stop reason: HOSPADM

## 2017-04-11 RX ORDER — ACETAMINOPHEN 10 MG/ML
1000 INJECTION, SOLUTION INTRAVENOUS ONCE
Status: COMPLETED | OUTPATIENT
Start: 2017-04-11 | End: 2017-04-11

## 2017-04-11 RX ADMIN — GLYCOPYRROLATE 0.2 MG: 0.2 INJECTION INTRAMUSCULAR; INTRAVENOUS at 08:56

## 2017-04-11 RX ADMIN — PROPOFOL 20 MCG/KG/MIN: 10 INJECTION, EMULSION INTRAVENOUS at 11:02

## 2017-04-11 RX ADMIN — HYDROMORPHONE HYDROCHLORIDE 1 MG: 1 INJECTION, SOLUTION INTRAMUSCULAR; INTRAVENOUS; SUBCUTANEOUS at 22:11

## 2017-04-11 RX ADMIN — IPRATROPIUM BROMIDE AND ALBUTEROL SULFATE 3 ML: .5; 3 SOLUTION RESPIRATORY (INHALATION) at 20:10

## 2017-04-11 RX ADMIN — CLINDAMYCIN PHOSPHATE 900 MG: 18 INJECTION, SOLUTION INTRAVENOUS at 18:38

## 2017-04-11 RX ADMIN — OXYCODONE HYDROCHLORIDE 10 MG: 5 TABLET ORAL at 15:28

## 2017-04-11 RX ADMIN — ACETAMINOPHEN 1000 MG: 500 TABLET ORAL at 20:04

## 2017-04-11 RX ADMIN — CETIRIZINE HYDROCHLORIDE 5 MG: 10 TABLET, FILM COATED ORAL at 16:59

## 2017-04-11 RX ADMIN — SODIUM CHLORIDE, POTASSIUM CHLORIDE, SODIUM LACTATE AND CALCIUM CHLORIDE 9 ML/HR: 600; 310; 30; 20 INJECTION, SOLUTION INTRAVENOUS at 08:00

## 2017-04-11 RX ADMIN — SODIUM CHLORIDE 250 ML: 9 INJECTION, SOLUTION INTRAVENOUS at 20:29

## 2017-04-11 RX ADMIN — MELATONIN 5 MG TABLET 2.5 MG: at 20:29

## 2017-04-11 RX ADMIN — SODIUM CHLORIDE 1000 MG: 9 INJECTION, SOLUTION INTRAVENOUS at 11:05

## 2017-04-11 RX ADMIN — BUSPIRONE HYDROCHLORIDE 5 MG: 5 TABLET ORAL at 17:00

## 2017-04-11 RX ADMIN — OXYCODONE HYDROCHLORIDE 10 MG: 10 TABLET, FILM COATED, EXTENDED RELEASE ORAL at 08:00

## 2017-04-11 RX ADMIN — EPHEDRINE SULFATE 10 MG: 50 INJECTION INTRAMUSCULAR; INTRAVENOUS; SUBCUTANEOUS at 12:16

## 2017-04-11 RX ADMIN — VANCOMYCIN HCL-SODIUM CHLORIDE IV SOLN 1.5 GM/250ML-0.9% 1500 MG: 1.5-0.9/25 SOLUTION at 08:15

## 2017-04-11 RX ADMIN — HYDROMORPHONE HYDROCHLORIDE 1 MG: 1 INJECTION, SOLUTION INTRAMUSCULAR; INTRAVENOUS; SUBCUTANEOUS at 17:42

## 2017-04-11 RX ADMIN — MIRTAZAPINE 7.5 MG: 15 TABLET, FILM COATED ORAL at 20:03

## 2017-04-11 RX ADMIN — IPRATROPIUM BROMIDE AND ALBUTEROL SULFATE 3 ML: .5; 3 SOLUTION RESPIRATORY (INHALATION) at 08:55

## 2017-04-11 RX ADMIN — POTASSIUM CHLORIDE 20 MEQ: 20 TABLET, EXTENDED RELEASE ORAL at 17:00

## 2017-04-11 RX ADMIN — BUPIVACAINE HYDROCHLORIDE 15 MG: 5 INJECTION, SOLUTION EPIDURAL; INTRACAUDAL; PERINEURAL at 10:43

## 2017-04-11 RX ADMIN — ONDANSETRON 4 MG: 2 INJECTION, SOLUTION INTRAMUSCULAR; INTRAVENOUS at 08:57

## 2017-04-11 RX ADMIN — CITALOPRAM HYDROBROMIDE 20 MG: 20 TABLET ORAL at 20:03

## 2017-04-11 RX ADMIN — LORAZEPAM 0.5 MG: 0.5 TABLET ORAL at 16:59

## 2017-04-11 RX ADMIN — ACETAMINOPHEN 1000 MG: 500 TABLET ORAL at 15:28

## 2017-04-11 RX ADMIN — VANCOMYCIN HYDROCHLORIDE 250 MG: 500 INJECTION, POWDER, LYOPHILIZED, FOR SOLUTION INTRAVENOUS at 20:28

## 2017-04-11 RX ADMIN — SODIUM CHLORIDE, POTASSIUM CHLORIDE, SODIUM LACTATE AND CALCIUM CHLORIDE 75 ML/HR: 600; 310; 30; 20 INJECTION, SOLUTION INTRAVENOUS at 17:44

## 2017-04-11 RX ADMIN — ACETAMINOPHEN 1000 MG: 10 INJECTION, SOLUTION INTRAVENOUS at 08:00

## 2017-04-11 RX ADMIN — SODIUM CHLORIDE, POTASSIUM CHLORIDE, SODIUM LACTATE AND CALCIUM CHLORIDE: 600; 310; 30; 20 INJECTION, SOLUTION INTRAVENOUS at 12:26

## 2017-04-11 RX ADMIN — VANCOMYCIN HYDROCHLORIDE 1000 MG: 1 INJECTION, POWDER, LYOPHILIZED, FOR SOLUTION INTRAVENOUS at 20:28

## 2017-04-11 RX ADMIN — IPRATROPIUM BROMIDE AND ALBUTEROL SULFATE 3 ML: .5; 3 SOLUTION RESPIRATORY (INHALATION) at 15:50

## 2017-04-11 RX ADMIN — PREGABALIN 150 MG: 75 CAPSULE ORAL at 08:00

## 2017-04-11 RX ADMIN — DEXTROSE MONOHYDRATE 900 MG: 50 INJECTION, SOLUTION INTRAVENOUS at 11:00

## 2017-04-11 RX ADMIN — SODIUM CHLORIDE 1000 MG: 9 INJECTION, SOLUTION INTRAVENOUS at 13:15

## 2017-04-11 RX ADMIN — HYDROMORPHONE HYDROCHLORIDE 1 MG: 1 INJECTION, SOLUTION INTRAMUSCULAR; INTRAVENOUS; SUBCUTANEOUS at 17:44

## 2017-04-11 RX ADMIN — VANCOMYCIN HYDROCHLORIDE 1250 MG: 1 INJECTION, POWDER, LYOPHILIZED, FOR SOLUTION INTRAVENOUS at 20:27

## 2017-04-11 RX ADMIN — Medication 2.5 MG: at 21:40

## 2017-04-11 RX ADMIN — MIDAZOLAM HYDROCHLORIDE 1 MG: 1 INJECTION, SOLUTION INTRAMUSCULAR; INTRAVENOUS at 10:12

## 2017-04-11 RX ADMIN — EPHEDRINE SULFATE 10 MG: 50 INJECTION INTRAMUSCULAR; INTRAVENOUS; SUBCUTANEOUS at 11:08

## 2017-04-11 RX ADMIN — DOCUSATE SODIUM AND SENNOSIDES 2 TABLET: 8.6; 5 TABLET, FILM COATED ORAL at 16:59

## 2017-04-11 RX ADMIN — FUROSEMIDE 20 MG: 20 TABLET ORAL at 16:59

## 2017-04-11 RX ADMIN — GABAPENTIN 200 MG: 100 CAPSULE ORAL at 17:00

## 2017-04-11 RX ADMIN — OXYCODONE HYDROCHLORIDE 10 MG: 5 TABLET ORAL at 20:03

## 2017-04-11 RX ADMIN — FAMOTIDINE 20 MG: 10 INJECTION INTRAVENOUS at 08:57

## 2017-04-11 RX ADMIN — SODIUM CHLORIDE, POTASSIUM CHLORIDE, SODIUM LACTATE AND CALCIUM CHLORIDE: 600; 310; 30; 20 INJECTION, SOLUTION INTRAVENOUS at 10:40

## 2017-04-11 RX ADMIN — ERGOCALCIFEROL 50000 UNITS: 1.25 CAPSULE ORAL at 16:59

## 2017-04-11 NOTE — PLAN OF CARE
Problem: Patient Care Overview (Adult)  Goal: Plan of Care Review  Outcome: Ongoing (interventions implemented as appropriate)    04/11/17 6663   Coping/Psychosocial Response Interventions   Plan Of Care Reviewed With patient   Patient Care Overview   Progress improving   Outcome Evaluation   Outcome Summary/Follow up Plan denies pain or nausea

## 2017-04-11 NOTE — ANESTHESIA POSTPROCEDURE EVALUATION
Patient: Mariangel Thomas    Procedure Summary     Date Anesthesia Start Anesthesia Stop Room / Location    04/11/17 1053 1348 [facility] / [facility]       Procedure Diagnosis Surgeon Provider    TOTAL HIP ARTHROPLASTY REVISION, depuy & baljeet, cultures taken, 7fr hemovac placement (Left Hip) Acetabular protrusion  (Acetabular protrusion [M24.7] Left) MD Billy Sherman, CRNA          Anesthesia Type: spinal  Last vitals  /74 (04/11/17 1342)    Temp 97.8 °F (36.6 °C) (04/11/17 1342)    Pulse 80 (04/11/17 1342)   Resp 14 (04/11/17 1342)    SpO2 92 % (04/11/17 1342)      Post Anesthesia Care and Evaluation    Patient location during evaluation: bedside  Patient participation: complete - patient participated  Level of consciousness: awake and alert  Pain score: 0  Pain management: adequate  Airway patency: patent  Anesthetic complications: No anesthetic complications    Cardiovascular status: acceptable  Respiratory status: acceptable  Hydration status: acceptable

## 2017-04-11 NOTE — PLAN OF CARE
Problem: Patient Care Overview (Adult)  Goal: Plan of Care Review  Outcome: Ongoing (interventions implemented as appropriate)    04/11/17 0824   Coping/Psychosocial Response Interventions   Plan Of Care Reviewed With patient   Patient Care Overview   Progress no change   Outcome Evaluation   Outcome Summary/Follow up Plan VSS, awaiting procedure       Goal: Adult Individualization and Mutuality  Outcome: Ongoing (interventions implemented as appropriate)    04/11/17 0824   Individualization   Patient Specific Preferences Goes by Casie   Mutuality/Individual Preferences   What Anxieties, Fears or Concerns Do You Have About Your Health or Care? Denies         Problem: Perioperative Period (Adult)  Goal: Signs and Symptoms of Listed Potential Problems Will be Absent or Manageable (Perioperative Period)    04/11/17 0824   Perioperative Period   Problems Assessed (Perioperative Period) all   Problems Present (Perioperative Period) none

## 2017-04-11 NOTE — H&P
HOSPITALIST SERVICES     @ [place], [place]                Hospitalist Team    ADMISSION HISTORY AND PHYSICAL    PATIENT:      Patient Care Team:  Antoine Talavera Jr., MD as PCP - General  Antoine Talavera Jr., MD as PCP - Family Medicine    CHIEF COMPLAINT:     Pain Lt Hip after Hip replacement 16 months ago        HISTORY OF PRESENT ILLNESS:    Ms. Mariangel Thomas is a 56 year old  female who is known to have COPD, GERD, Urinary incontinence, DJD, Psychosis and insomnia who had Lt Total Hip arthroplasty done in 2015 but has been having increasing pain and X-Rays showed protrusion of acetabulum into the pelvis. Dr Chavez did Lt total hip arthroplasty revision today. The Hospitalist service has been contacted to take care of patient's medical problems. Patient has some pain in hip at this time but denies any sx of headache, shortness of breath, chest pain, abdominal pain, dysuria or recent hx of blood in stool. She has some nausea but no vomiting. Patient is resting comfortably in bed.          Past Medical History:   Diagnosis Date   • Anxiety    • Chronic respiratory failure    • COPD (chronic obstructive pulmonary disease)    • Disc disorder     lumbar/thoracic   • Fracture of hip     sched total hip revision   • GERD (gastroesophageal reflux disease)    • Incontinent of urine     incontinent of bowel & bladder   • Insomnia    • Kidney failure    • Lumbosacral disc disease    • Muscle weakness     generalized   • Neuropathy    • OA (osteoarthritis)    • Psychosis    • Severe sepsis without septic shock      Past Surgical History:   Procedure Laterality Date   •  SECTION     • CHOLECYSTECTOMY     • TOTAL HIP ARTHROPLASTY Left 2015   • TUBAL ABDOMINAL LIGATION       History reviewed. No pertinent family history.  Social History   Substance Use Topics   • Smoking status: Current Every Day Smoker     Packs/day: 0.50     Years: 20.00     Types: Cigarettes   • Smokeless  tobacco: Never Used   • Alcohol use No     Prescriptions Prior to Admission   Medication Sig Dispense Refill Last Dose   • busPIRone (BUSPAR) 5 MG tablet Take 5 mg by mouth 2 (Two) Times a Day.   4/10/2017 at Unknown time   • citalopram (CeleXA) 20 MG tablet Take 20 mg by mouth Every Night.   4/10/2017 at Unknown time   • diphenhydrAMINE (BENADRYL) 25 mg capsule Take 25 mg by mouth Every 8 (Eight) Hours As Needed for itching.   4/10/2017 at Unknown time   • furosemide (LASIX) 20 MG tablet Take 20 mg by mouth Daily.   4/10/2017 at Unknown time   • gabapentin (NEURONTIN) 100 MG capsule Take 200 mg by mouth 2 (Two) Times a Day.   4/10/2017 at Unknown time   • Loratadine 10 MG capsule Take 10 mg by mouth Daily.   4/10/2017 at Unknown time   • LORazepam (ATIVAN) 0.5 MG tablet Take 0.5 mg by mouth every 8 (eight) hours as needed for anxiety.   4/10/2017 at Unknown time   • magnesium hydroxide (MILK OF MAGNESIA) 400 MG/5ML suspension Take 30 mL by mouth Every 6 (Six) Hours As Needed for constipation.   Taking   • melatonin 5 MG tablet tablet Take 3 mg by mouth Every Night.   4/10/2017 at Unknown time   • mirtazapine (REMERON) 7.5 MG tablet Take 7.5 mg by mouth Every Night.   4/10/2017 at Unknown time   • omeprazole (priLOSEC) 20 MG capsule Take 20 mg by mouth Daily.   4/10/2017 at Unknown time   • ondansetron (ZOFRAN) 4 MG tablet Take 4 mg by mouth Every 6 (Six) Hours As Needed for nausea or vomiting.   4/10/2017 at Unknown time   • oxyCODONE-acetaminophen (PERCOCET) 5-325 MG per tablet Take 1 tablet by mouth Every 4 (Four) Hours As Needed for moderate pain (4-6).   4/10/2017 at Unknown time   • potassium chloride (K-DUR,KLOR-CON) 20 MEQ CR tablet Take 20 mEq by mouth 2 (Two) Times a Day.   4/10/2017 at Unknown time   • Umeclidinium-Vilanterol 62.5-25 MCG/INH aerosol powder  Inhale 1 puff Every Morning.   4/10/2017 at Unknown time   • vitamin D (ERGOCALCIFEROL) 41343 UNITS capsule capsule Take 50,000 Units by mouth Every  "14 (Fourteen) Days.   Past Week at Unknown time   • apixaban (ELIQUIS) 2.5 MG tablet tablet Take 2.5 mg by mouth Every 12 (Twelve) Hours.   3/28/2017     Allergies:  Aleve [naproxen sodium]; Amoxicillin; Bactrim [sulfamethoxazole-trimethoprim]; Keflex [cephalexin]; Lamisil [terbinafine hcl]; Levaquin [levofloxacin in d5w]; and Penicillins    REVIEW OF SYSTEMS:  Please see the above history of present illness for pertinent positives and negatives.  The remainder of the patient's systems have been reviewed and are negative.     Vital Signs  Temp:  [97.8 °F (36.6 °C)-98.5 °F (36.9 °C)] 97.8 °F (36.6 °C)  Heart Rate:  [] 101  Resp:  [14-20] 18  BP: ()/(51-91) 153/70    Flowsheet Rows         First Filed Value    Admission Height  67\" (170.2 cm) Documented at 04/05/2017 1602    Admission Weight  180 lb (81.6 kg) Documented at 04/05/2017 1602           Physical Exam:    PHYSICAL EXAMINATION:    VITAL SIGNS: As per Nurse's notes    GENERAL APPEARANCE: The patient is a well developed, well nourished, , in no acute distress without complaints of shortness of breath, dyspnea or acute pain. Lips and nail beds are pink.    HEENT: Normocephalic, atraumatic. PERRL. The sclerae anicteric and conjunctivae pink and moist. Extraocular muscle movements intact. The oral mucosa, hard and soft palate, tongue and posterior pharynx were normal.     NECK: Supple and symmetric. No masses. No thyromegaly. No tenderness. Trachea central. No carotid bruits. No evidence of JVD.    LYMPH NODES: No palpable lymphadenopathy.    SKIN: Warm, dry and intact. No rash or lesions or wounds or patechiae.    CHEST: Normal AP diameter and normal contour without any kyphoscoliosis.    LUNGS: Clear to auscultation bilaterally. Respiratory expansion equal bilaterally. No wheezes/rales/crackles/rubs.    CARDIOVASCULAR: RRR. S1, S2 normal without murmur/gallop/rub. No S3, S4. The carotid pulses were normal and 2+ bilaterally without bruits. " Peripheral pulses were 2+ and symmetric. Capillary refill less than 3 seconds.    ABDOMEN: Soft, non-tender, non-distended. No masses. No rebound/guarding. No hepatosplenomegaly. Normal bowel sounds. No auscultatable bruits.     RECTAL: Not done.     EXTREMITIES:  S/P Lt total hip arthroplasty revision. No evidence of cyanosis, clubbing, or edema. No rash or lesions. + pedal pulses.     MUSCULOSKELETAL: S/P Lt total hip arthroplasty revision.  Muscle strength and tone normal.    PSYCHIATRY: Responds appropriately to questions. No evidence of acute anxiety, depression, panic attacks or hallucinations.    MENTAL STATUS EXAMINATION: Neatly dressed, conscious and alert. Speech normal in tone. Pleasant and cooperative. Orientation x3. Thought process, content and insight are normal. Memory without deficits.    NEUROLOGIC: Examination is grossly intact globally with no focal deficits. Cranial nerves II through XII are grossly intact.            Results Review:    I reviewed the patient's new clinical results.  Lab Results (most recent)     Procedure Component Value Units Date/Time    Potassium [[id number]]  (Normal) Collected:  04/11/17 0814    Specimen:  Blood Updated:  04/11/17 0846     Potassium 4.3 mmol/L     CBC (No Diff) [[id number]]  (Abnormal) Collected:  04/11/17 1015    Specimen:  Blood Updated:  04/11/17 1022     WBC 7.58 10*3/mm3      RBC 4.71 10*6/mm3      Hemoglobin 11.5 (L) g/dL      Hematocrit 38.6 %      MCV 82.0 fL      MCH 24.4 (L) pg      MCHC 29.8 (L) g/dL      RDW 16.6 (H) %      RDW-SD 48.9 fl      MPV 12.1 (H) fL      Platelets 186 10*3/mm3     Anaerobic Culture [[id number]] Collected:  04/11/17 1133    Specimen:  Wound from Hip, Left Updated:  04/11/17 1147    Wound Culture [[id number]] Collected:  04/11/17 1133    Specimen:  Wound from Hip, Left Updated:  04/11/17 1147          Imaging Results (most recent)     Procedure Component Value Units Date/Time    XR Hip With or Without Pelvis 2 - 3 View Left  [[id number]] Collected:  04/11/17 0901     Updated:  04/11/17 0905    Narrative:       LEFT HIP, 04/11/2017:     HISTORY:  56-year-old female status post left hip arthroplasty after hip fracture  December 2015. Preoperative evaluation prior to reported revision hip  surgery.     TECHNIQUE:  Two view left hip series.     FINDINGS:  The examination shows significant chronic protrusion of the acetabular  cup component into the hip with some surrounding ossification. The  femoral component is well seated, and there is no hip dislocation or  subluxation. No visible fracture involving the remainder of the  visualized left hemipelvis.       Impression:       1. 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  2. No acute osseous abnormality.     This report was finalized on 4/11/2017 9:03 AM by Dr. Jerome Espino MD.       XR Hip With or Without Pelvis 1 View Left [[id number]] Collected:  04/11/17 1418     Updated:  04/11/17 1421    Narrative:       POSTOP LEFT HIP, 04/11/2017:     HISTORY:  Postop revision left hip arthroplasty.     TECHNIQUE:  Single AP radiograph of the left hip was obtained portably following  surgery.     FINDINGS:  Postop changes revision left hip arthroplasty. No visible fracture or  dislocation.     This report was finalized on 4/11/2017 2:18 PM by Dr. Jerome Espino MD.           reviewed    ECG/EMG Results (most recent)     Procedure Component Value Units Date/Time    ECG 12 Lead [[id number]] Collected:  04/11/17 0853     Updated:  04/11/17 0913    Narrative:       RR Interval= 750 ms  ND Interval= 160 ms  QRSD Interval= 86 ms  QT Interval= 376 ms  QTc Interval= 434 ms  Heart Rate= 80 ms  P Axis= 64 deg  QRS Axis= -18 deg  T Wave Axis= 107 deg  I: 40 Axis= 18 deg  T: 40 Axis= -34 deg  ST Axis= 133 deg  SINUS RHYTHM  NONSPECIFIC T ABNORMALITIES, LATERAL LEADS  NO SIGNIFICANT CHANGE FROM PREVIOUS ECG  Electronically Signed by:  Leighton Love ([facility]) 11-Apr-2017  09:07:15  Date and Time of Study: 2017-04-11 08:53:24        reviewed    Assessment/Plan         ASSESSMENT AND PLAN:       SUMMARY:    ?   PROPHYLAXIS:   -Oxygen Saturation: As per Nurses' notes.   -DVT Prophylaxis: Eliquis  -Gastritis Prophylaxis: Pantoprazole    -Constipation Prophylaxis: SenoKot    -Immunizations: N/A  -Intake and Output Orders: As per order sheet   -Moffett Catheter: Not indicated at this time  -Smoking/ Nicotine issues: N/A      PAIN MANAGEMENT:  -Pain Management: Roxicodone  -Miscellaneous Medications: Tylenol 650 mg 1 po q4-6 hours for headache or   temp >100 degrees     ACTIVITIES:  -Bathroom Privileges: May use bathroom   -Activity: Encouraged to sit up in side chair for 2-4 hours BID   -PT/OT: Physical THerapy consult has been requested for evaluation and ROM exercises     NUTRITION AND FLUIDS:  -Diet/ Nutrition: Regular diet; Advance as directed  -Fluid Status/Electrolytes: Lactated Ringers 75 mL/Hr      SOCIAL ISSUES:   -MRSA SCREEN: As per institutional protocol   -Behavioral/ Agitation Issues: NONE   -Social Issues: Lives at home with her family.   -Occupational Issues: Patient is Retired.   -Code Status: FULL CODE on admission   -Disposition: TBD     THERAPEUTIC:   ALLERGIES: as per admission H&P   ANTIBIOTICS: as per Dr Chavez   INSULIN THERAPY: N/A    CHEST PAIN: N/A    NEBULIZER TREATMENT: DuoNeb via nebulizer    ANXIETY: Buspar and Lorazepam   DEPRESSION: Celexa and Remeron    INSOMNIA: Melatonin              PLAN:    Labs and diagnostic tests reviewed: WBC 7.58, Hb 11.5, Plt 186    Diagnostic tests reviewed:    PreOp Lt Hip X-Ray  IMPRESSION:  1. 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  2. No acute osseous abnormality.      This report was finalized on 4/11/2017 9:03 AM by Dr. Jerome Espino MD.        PostOp Lt Hip X-Ray  FINDINGS:  Postop changes revision left hip arthroplasty. No visible fracture or  dislocation.      This report was  finalized on 4/11/2017 2:18 PM by Dr. Jerome Espino MD.      EKG  SINUS RHYTHM  NONSPECIFIC T ABNORMALITIES, LATERAL LEADS  NO SIGNIFICANT CHANGE FROM PREVIOUS ECG  Electronically Signed by:  Leighton Love ([facility]) 11-Apr-2017 09:07:15  Date and Time of Study: 2017-04-11 08:53:24          Patient is clinically and hemodynamically stable    Was seen by Dr Chavez, Orthopedic consultant this morning for surgery    Any new recommendations: As per Dr. Chavez    New Labs ordered: As per Dr. Chavez    New diagnostic tests ordered: As per Dr. Chavez    Any changes in medications: N/A    To continue current management and supportive care    Pain management issues: Roxicodone    Discharge planning issues: As per Dr Chavez (Home Vs placement)    Will follow patient closely    Nothing new to add for right now          DIAGNOSES:      PRIMARY DIAGNOSES:    1) S/P Lt Total Hip Arthroplasty Revision: Revision surgery done today by Dr. Chavez    2) COPD: On DuoNeb via nebulizer    3) GERD: On Pantoprazole    4) Anxiety: On Buspar    5) Depression: On Celexa and Remeron    6) Psychosis: Hx noted    7) DJD: Hx noted    8) Neuropathy: On Lyrica    9) Lumbosacral Disk Disease: Hx noted    10) Vitamin D deficiency: On replacement    11) DVT Prophylaxis: On Eliquis  ?     SECONDARY DIAGNOSES:  ?       As above      SURGICAL DIAGNOSES:        As per Problem List          I discussed the patients findings and my recommendations with patient and patient is agreeable to current treatment and management plan.     Jose Juarez MD  04/11/17  4:54 PM          Jose Juarez M.D., FACP  Internal Medicine/ Hospitalist        Time:       EMR Dragon/Transcription disclaimer:      Much of this encounter note is an electronic transcription/translation of spoken language to printed text. The electronic translation of spoken language may permit erroneous, or at times, nonsensical words or phrases to be inadvertently transcribed; Although I have reviewed  the note for such errors, some may still exist.

## 2017-04-11 NOTE — ANESTHESIA PREPROCEDURE EVALUATION
Anesthesia Evaluation     Patient summary reviewed and Nursing notes reviewed   no history of anesthetic complications:  NPO Status: > 8 hours   Airway   Mallampati: III  TM distance: >3 FB  Neck ROM: full  possible difficult intubation  Dental    (+) upper dentures and poor dentition    Comment: Poor lower teeth, nothing loose      Pulmonary    (+) a smoker (1/2 ppd x 1 yr) Current, COPD mild, decreased breath sounds,     ROS comment: O2 SAT 90%       3/7/17Site  Arterial: right radial  Colin's Test  Positive  pH, Arterial 7.350 - 7.450 pH units 7.397  pCO2, Arterial 35.0 - 45.0 mm Hg 48.7 (H)  pO2, Arterial 80.0 - 100.0 mm Hg 60.7 (L)  HCO3, Arterial 22.0 - 26.0 mmol/L 29.3 (H)  Base Excess, Arterial -2.3 - 2.3 mmol/L 3.7 (H)  O2 Saturation Calculated 91.0 - 100.0 % 90.7 (L)  Hemoglobin, Blood Gas 12 - 18 g/dL 12.1  Barometric Pressure for Blood Gas mmHg 749.3  Modality  Room air  Rate Breaths/minute 21  Pulse Ox % 88  Resulting Agency  [facility]      Cardiovascular - normal exam  Exercise tolerance: poor (<4 METS)    Rhythm: regular  Rate: normal    (+) DVT resolved,     ROS comment: Denies CV sequalae    Nl ECHO 2015    Neuro/Psych  (+) numbness (hands), psychiatric history (Psychosis) Anxiety, poor historian.,    GI/Hepatic/Renal/Endo    (+) obesity,  GERD well controlled, chronic renal disease (pt denies, DELONTE),     Musculoskeletal     (+) back pain,       ROS comment: Wheel chair bound, foot drop R, has not been able to walk > 1 year  Abdominal   (+) obese,    Substance History      OB/GYN          Other   (+) arthritis                         Anesthesia Plan    ASA 3     spinal     Anesthetic plan and risks discussed with patient.  Use of blood products discussed with patient .

## 2017-04-11 NOTE — OP NOTE
DATE OF OPERATION:  04/11/2017    PREOPERATIVE DIAGNOSIS:  Failed left total hip with acetabular protrusio and loose acetabular cup.     POSTOPERATIVE DIAGNOSIS:  Failed left total hip with acetabular protrusio and loose acetabular cup.     PROCEDURE PERFORMED: Revision left total hip with revision of acetabular cup and femoral head.  Acetabular cup used was a [place] custom triflange acetabular cup with HA size 52 mm with a 36 x 52 neutral liner, and six 6.5 x 25 mm screws for acetabular fixation and the femoral head was also revised to a 36, +0 ceramic head with a sleeve, Tessy head.     INDICATIONS: The patient is a 56-year-old female who is 18 months status post a left total hip and went onto develop a significant acetabular protrusion, and was admitted for the above-stated procedure.     ANESTHESIA:  Spinal.     SURGEON: Piter Chavez MD     ASSISTANT: WILDER Fitzgerald     ESTIMATED BLOOD LOSS: 500 mL.      DRAINS: x1.     DESCRIPTION OF PROCEDURE: The patient was brought to the operating room and after satisfactory spinal anesthesia was obtained, the patient was placed in the left lateral decubitus position. The patient was given preoperatively 900 of Cleocin, 1.5 g of vancomycin, 1 g of IV Tylenol, and 1 g of tranexamic acid.  Time-out was completed, identifying the patient and the procedure correctly. The left hip was then prepped. After the prep dried for 3 minutes, it was draped in a sterile field in the usual manner with timeout again completed. Going through the old lateral incision, it was extended proximally and slightly posteriorly for about 4 cm. The entire incision measured probably in length 10-12 cm.  Via blunt and sharp dissection, the vastus lateralis and gluteus alycia were identified and incised, and then the tensor fascia evelyn and gluteus medius were elevated anteriorly off the femoral neck capsule.  Thick capsule was encountered and totally excised. After wide excision of the  anterior femoral neck capsule, the femoral head and acetabulum were easily identified. The hip was then dislocated and the previously placed ceramic head was removed. Once this was accomplished with retraction, the acetabulum was exposed.  Wide excision of all soft tissue and bony osteophytes were carried out. The polyethylene liner was removed and then the 3 previously placed acetabular screws were removed and then the cup was easily removed.  It was reamed first at 50 mm and then a 51 mm for later implantation of the custom triflange cup. Via blunt and sharp dissection, the anterior ileum was exposed for the fixation of the cuff and also distally along the ischium.  After wide excision and the trial reduction with the sterilized custom cup, satisfactory position was obtained and intraoperative x-ray showed satisfactory position. The triflange cup was then inserted and placed within the previous acetabular cup. There was good bony contact on the triflange component of the cup on bony ridges anteriorly, inferiorly and superiorly. Fixation was carried out superiorly with the six 6.5 screws, securing the cup in place.  The trial liner was inserted and then reduction was carried out 1st at the -2 neck and then the 0 neck.  Excellent stability was noted and equal limb length. All trial components were then removed. The neutral liner was press-fit over the [place] custom cup and then a sleeve was placed over the neck and then a 0+, 36 ceramic head placed over the stem and the hip was again reduced and noted to be stable.  Throughout the arc of motion, there was no dislocation.  The wound was irrigated with copious amounts of Betadine solution and normal saline. Then, 160 mL of the Exparel solution was injected deep in superficial tissues. Vancomycin powder was placed in the deep wound as was the Hemovac.  Vastus lateralis and gluteus medius were closed with interrupted #2 Vicryl and tensor fascia evelyn and gluteus  alycia with #2 Vicryl. The remainder of the vancomycin powder was placed in the subcutaneous wound. Subcutaneous was closed with running subcuticular #1 STRATAFIX then #0 STRATAFIX, and the skin was closed with a staple gun. A bulky, sterile dressing was applied. All counts were correct. The patient was transferred to recovery, having tolerated the procedure well.       TEJ Abbott  D:  04/11/2017 13:34:26   T:  04/11/2017 15:18:32   Job ID:  [id number]   Document ID:  [id number]  cc:

## 2017-04-11 NOTE — ANESTHESIA PROCEDURE NOTES
Spinal Block    Start Time: 4/11/2017 10:30 AM  Preanesthetic Checklist  Completed: patient identified, surgical consent, pre-op evaluation, timeout performed, IV checked, risks and benefits discussed and monitors and equipment checked  Spinal Block Prep:  Patient Position:sitting  Sterile Tech:cap, gloves, mask and sterile barriers  Prep:Betadine  Patient Monitoring:blood pressure monitoring, continuous pulse oximetry and EKG  Spinal Block Procedure  Approach:midline  Guidance:landmark technique  Location:L2-L3  Needle Type:Pencan  Needle Gauge:27 G  Placement of Spinal needle event:cerebrospinal fluid aspirated  Paresthesia: no  Fluid Appearance:clear  Post Assessment  Patient Tolerance:patient tolerated the procedure well with no apparent complications  Complications no

## 2017-04-12 ENCOUNTER — APPOINTMENT (OUTPATIENT)
Dept: GENERAL RADIOLOGY | Facility: HOSPITAL | Age: 57
End: 2017-04-12

## 2017-04-12 LAB
ALBUMIN SERPL-MCNC: 2.8 G/DL (ref 3.5–5.2)
ALBUMIN/GLOB SERPL: 0.8 G/DL
ALP SERPL-CCNC: 79 U/L (ref 40–129)
ALT SERPL W P-5'-P-CCNC: 15 U/L (ref 5–33)
ANION GAP SERPL CALCULATED.3IONS-SCNC: 6.9 MMOL/L
APTT PPP: 27.9 SECONDS (ref 24.3–38.1)
AST SERPL-CCNC: 27 U/L (ref 5–32)
BASOPHILS # BLD AUTO: 0.05 10*3/MM3 (ref 0–0.2)
BASOPHILS NFR BLD AUTO: 0.2 % (ref 0–2)
BILIRUB SERPL-MCNC: 0.5 MG/DL (ref 0.2–1.2)
BILIRUB UR QL STRIP: NEGATIVE
BUN BLD-MCNC: 16 MG/DL (ref 6–20)
BUN/CREAT SERPL: 16.2 (ref 7–25)
CALCIUM SPEC-SCNC: 8.9 MG/DL (ref 8.6–10.5)
CHLORIDE SERPL-SCNC: 98 MMOL/L (ref 98–107)
CLARITY UR: CLEAR
CO2 SERPL-SCNC: 29.1 MMOL/L (ref 22–29)
COLOR UR: ABNORMAL
CREAT BLD-MCNC: 0.99 MG/DL (ref 0.57–1)
DEPRECATED RDW RBC AUTO: 50.1 FL (ref 37–54)
EOSINOPHIL # BLD AUTO: 0 10*3/MM3 (ref 0.1–0.3)
EOSINOPHIL NFR BLD AUTO: 0 % (ref 0–4)
ERYTHROCYTE [DISTWIDTH] IN BLOOD BY AUTOMATED COUNT: 16.6 % (ref 11.5–14.5)
GFR SERPL CREATININE-BSD FRML MDRD: 58 ML/MIN/1.73
GLOBULIN UR ELPH-MCNC: 3.6 GM/DL
GLUCOSE BLD-MCNC: 190 MG/DL (ref 65–99)
GLUCOSE UR STRIP-MCNC: NEGATIVE MG/DL
HCT VFR BLD AUTO: 30.5 % (ref 37–47)
HGB BLD-MCNC: 9 G/DL (ref 12–16)
HGB UR QL STRIP.AUTO: NEGATIVE
IMM GRANULOCYTES # BLD: 0.08 10*3/MM3 (ref 0–0.03)
IMM GRANULOCYTES NFR BLD: 0.4 % (ref 0–0.5)
KETONES UR QL STRIP: NEGATIVE
LEUKOCYTE ESTERASE UR QL STRIP.AUTO: NEGATIVE
LYMPHOCYTES # BLD AUTO: 2.22 10*3/MM3 (ref 0.6–4.8)
LYMPHOCYTES NFR BLD AUTO: 10.9 % (ref 20–45)
MCH RBC QN AUTO: 24.6 PG (ref 27–31)
MCHC RBC AUTO-ENTMCNC: 29.5 G/DL (ref 31–37)
MCV RBC AUTO: 83.3 FL (ref 81–99)
MONOCYTES # BLD AUTO: 2.8 10*3/MM3 (ref 0–1)
MONOCYTES NFR BLD AUTO: 13.8 % (ref 3–8)
NEUTROPHILS # BLD AUTO: 15.18 10*3/MM3 (ref 1.5–8.3)
NEUTROPHILS NFR BLD AUTO: 74.7 % (ref 45–70)
NITRITE UR QL STRIP: NEGATIVE
NRBC BLD MANUAL-RTO: 0 /100 WBC (ref 0–0)
PH UR STRIP.AUTO: <=5 [PH] (ref 4.5–8)
PLATELET # BLD AUTO: 200 10*3/MM3 (ref 140–500)
PMV BLD AUTO: 11.5 FL (ref 7.4–10.4)
POTASSIUM BLD-SCNC: 5 MMOL/L (ref 3.5–5.2)
PROT SERPL-MCNC: 6.4 G/DL (ref 6–8.5)
PROT UR QL STRIP: NEGATIVE
RBC # BLD AUTO: 3.66 10*6/MM3 (ref 4.2–5.4)
SODIUM BLD-SCNC: 134 MMOL/L (ref 136–145)
SP GR UR STRIP: 1.02 (ref 1–1.03)
UROBILINOGEN UR QL STRIP: ABNORMAL
WBC NRBC COR # BLD: 20.33 10*3/MM3 (ref 4.8–10.8)

## 2017-04-12 PROCEDURE — 85025 COMPLETE CBC W/AUTO DIFF WBC: CPT | Performed by: ORTHOPAEDIC SURGERY

## 2017-04-12 PROCEDURE — 99232 SBSQ HOSP IP/OBS MODERATE 35: CPT | Performed by: NURSE PRACTITIONER

## 2017-04-12 PROCEDURE — 99024 POSTOP FOLLOW-UP VISIT: CPT | Performed by: ORTHOPAEDIC SURGERY

## 2017-04-12 PROCEDURE — 81003 URINALYSIS AUTO W/O SCOPE: CPT | Performed by: NURSE PRACTITIONER

## 2017-04-12 PROCEDURE — 85730 THROMBOPLASTIN TIME PARTIAL: CPT | Performed by: ORTHOPAEDIC SURGERY

## 2017-04-12 PROCEDURE — 94799 UNLISTED PULMONARY SVC/PX: CPT

## 2017-04-12 PROCEDURE — 80053 COMPREHEN METABOLIC PANEL: CPT | Performed by: NURSE PRACTITIONER

## 2017-04-12 PROCEDURE — 71010 HC CHEST PA OR AP: CPT

## 2017-04-12 RX ORDER — NICOTINE 21 MG/24HR
1 PATCH, TRANSDERMAL 24 HOURS TRANSDERMAL EVERY 24 HOURS
Status: DISCONTINUED | OUTPATIENT
Start: 2017-04-12 | End: 2017-04-14 | Stop reason: HOSPADM

## 2017-04-12 RX ORDER — NICOTINE 21 MG/24HR
1 PATCH, TRANSDERMAL 24 HOURS TRANSDERMAL EVERY 24 HOURS
Status: DISCONTINUED | OUTPATIENT
Start: 2017-04-12 | End: 2017-04-12

## 2017-04-12 RX ORDER — IPRATROPIUM BROMIDE AND ALBUTEROL SULFATE 2.5; .5 MG/3ML; MG/3ML
3 SOLUTION RESPIRATORY (INHALATION)
Status: DISCONTINUED | OUTPATIENT
Start: 2017-04-12 | End: 2017-04-14 | Stop reason: HOSPADM

## 2017-04-12 RX ADMIN — OXYCODONE HYDROCHLORIDE 10 MG: 5 TABLET ORAL at 18:45

## 2017-04-12 RX ADMIN — POTASSIUM CHLORIDE 20 MEQ: 20 TABLET, EXTENDED RELEASE ORAL at 17:13

## 2017-04-12 RX ADMIN — CLINDAMYCIN PHOSPHATE 900 MG: 18 INJECTION, SOLUTION INTRAVENOUS at 02:31

## 2017-04-12 RX ADMIN — NICOTINE 1 PATCH: 14 PATCH, EXTENDED RELEASE TRANSDERMAL at 16:39

## 2017-04-12 RX ADMIN — DOCUSATE SODIUM AND SENNOSIDES 2 TABLET: 8.6; 5 TABLET, FILM COATED ORAL at 17:13

## 2017-04-12 RX ADMIN — FUROSEMIDE 20 MG: 20 TABLET ORAL at 08:18

## 2017-04-12 RX ADMIN — CITALOPRAM HYDROBROMIDE 20 MG: 20 TABLET ORAL at 20:45

## 2017-04-12 RX ADMIN — CETIRIZINE HYDROCHLORIDE 5 MG: 10 TABLET, FILM COATED ORAL at 08:19

## 2017-04-12 RX ADMIN — OXYCODONE HYDROCHLORIDE 10 MG: 5 TABLET ORAL at 22:49

## 2017-04-12 RX ADMIN — POTASSIUM CHLORIDE 20 MEQ: 20 TABLET, EXTENDED RELEASE ORAL at 08:18

## 2017-04-12 RX ADMIN — GABAPENTIN 200 MG: 100 CAPSULE ORAL at 17:13

## 2017-04-12 RX ADMIN — GABAPENTIN 200 MG: 100 CAPSULE ORAL at 08:19

## 2017-04-12 RX ADMIN — ACETAMINOPHEN 1000 MG: 500 TABLET ORAL at 02:32

## 2017-04-12 RX ADMIN — OXYCODONE HYDROCHLORIDE 10 MG: 5 TABLET ORAL at 14:49

## 2017-04-12 RX ADMIN — DOCUSATE SODIUM AND SENNOSIDES 2 TABLET: 8.6; 5 TABLET, FILM COATED ORAL at 08:18

## 2017-04-12 RX ADMIN — OXYCODONE HYDROCHLORIDE 10 MG: 5 TABLET ORAL at 00:03

## 2017-04-12 RX ADMIN — MIRTAZAPINE 7.5 MG: 15 TABLET, FILM COATED ORAL at 20:44

## 2017-04-12 RX ADMIN — ACETAMINOPHEN 1000 MG: 500 TABLET ORAL at 10:36

## 2017-04-12 RX ADMIN — ACETAMINOPHEN 1000 MG: 500 TABLET ORAL at 20:45

## 2017-04-12 RX ADMIN — BUSPIRONE HYDROCHLORIDE 5 MG: 5 TABLET ORAL at 08:18

## 2017-04-12 RX ADMIN — APIXABAN 2.5 MG: 2.5 TABLET, FILM COATED ORAL at 08:19

## 2017-04-12 RX ADMIN — OXYCODONE HYDROCHLORIDE 10 MG: 5 TABLET ORAL at 10:36

## 2017-04-12 RX ADMIN — MELATONIN 5 MG TABLET 2.5 MG: at 20:45

## 2017-04-12 RX ADMIN — ACETAMINOPHEN 1000 MG: 500 TABLET ORAL at 14:51

## 2017-04-12 RX ADMIN — SODIUM CHLORIDE, POTASSIUM CHLORIDE, SODIUM LACTATE AND CALCIUM CHLORIDE 75 ML/HR: 600; 310; 30; 20 INJECTION, SOLUTION INTRAVENOUS at 10:39

## 2017-04-12 RX ADMIN — APIXABAN 2.5 MG: 2.5 TABLET, FILM COATED ORAL at 20:45

## 2017-04-12 RX ADMIN — PANTOPRAZOLE SODIUM 40 MG: 40 TABLET, DELAYED RELEASE ORAL at 06:41

## 2017-04-12 RX ADMIN — IPRATROPIUM BROMIDE AND ALBUTEROL SULFATE 3 ML: .5; 3 SOLUTION RESPIRATORY (INHALATION) at 19:09

## 2017-04-12 RX ADMIN — IPRATROPIUM BROMIDE AND ALBUTEROL SULFATE 3 ML: .5; 3 SOLUTION RESPIRATORY (INHALATION) at 15:50

## 2017-04-12 RX ADMIN — BUSPIRONE HYDROCHLORIDE 5 MG: 5 TABLET ORAL at 17:13

## 2017-04-12 NOTE — SIGNIFICANT NOTE
04/12/17 1331   Rehab Treatment   Discipline occupational therapist   Rehab Evaluation   Evaluation Not Performed other (see comments)  (OT evaluation on hold. Patient continues to have no active movement in LLE (did have some movement in right toes). Patient did report feeling returning in left LE. Patient was lethargic and had difficulty answering questions. Nursing notified.  )   Recommendation   OT - Next Appointment 04/13/17

## 2017-04-12 NOTE — PROGRESS NOTES
Discharge Planning Assessment  CESAR [facility]     Patient Name: Mariangel Thomas  MRN: [id number]  Today's Date: 4/12/2017    Admit Date: 4/11/2017          Discharge Needs Assessment       04/12/17 1044    Living Environment    Lives With facility resident    Living Arrangements extended care facility    Able to Return to Prior Living Arrangements yes    Discharge Needs Assessment    Concerns To Be Addressed discharge planning concerns    Readmission Within The Last 30 Days no previous admission in last 30 days    Equipment Currently Used at Home wheelchair;walker, standard    Transportation Available --   will continue to assess    Discharge Planning Comments Spoke with pt at bedside, facesheet verified. Pt states she uses cane and walker at [facility] in  [place] and plans to return to facility at [place]. She does not use 02 at facility. Spoke with Rupal at [facility] who informs pt has a medicaid bed hold and  will accept pt at [place]. Clinicals faxed per request. Will continue to follow.            Discharge Plan       04/12/17 1051    Case Management/Social Work Plan    Plan returnt to  [facility]    Patient/Family In Agreement With Plan yes    Additional Comments Spoke with pt at bedside, facesheet verified. Pt states she uses cane and walker at [facility] in  [place] and plans to return to facility at [place]. She does not use 02 at facility. Spoke with Rupal at [facility] who informs pt has a medicaid bed hold and  will accept pt at [place]. Clinicals faxed per request. Will continue to follow        Discharge Placement     Facility/Agency Request Status Selected? Address Phone Number Fax Number    [facility] Accepted    Yes [address] [place] [id number] [phone]                Demographic Summary       04/12/17 1041    Referral Information    Admission Type inpatient    Arrived From long-term care;nursing  facility    Referral Source admission list;physician    Reason For Consult discharge planning    Record Reviewed medical record    Contact Information    Permission Granted to Share Information With ;facility     Primary Care Physician Information    Name Antoine Talavera Jr, MD            Functional Status     None            Psychosocial     None            Abuse/Neglect     None            Legal     None            Substance Abuse     None            Patient Forms     None          Satish Munguia RN

## 2017-04-12 NOTE — PROGRESS NOTES
"SERVICE: [facility] HOSPITALIST    CONSULTANTS: Ortho    CHIEF COMPLAINT: f/u LTHA secondary to protrusion of acetapulum into pelvis    SUBJECTIVE: Patient reports continued inability to move LLE but does have some feeling back from toes up to thigh. She c/o severe pain but is unable to stay awake throughout exam. She denies soa or sleep apnea. Denies f/c/cough/soa/chest pain/n/v/d/abdominal pain or other new concerns. Unable to participate in PT/OT at this time.    OBJECTIVE:    /74 (BP Location: Right arm, Patient Position: Lying)  Pulse 98  Temp 99.4 °F (37.4 °C) (Oral)   Resp 20  Ht 67\" (170.2 cm)  Wt 180 lb (81.6 kg)  SpO2 92%  BMI 28.19 kg/m2    MEDS/LABS REVIEWED AND ORDERED    acetaminophen 1,000 mg Oral Q6H   apixaban 2.5 mg Oral Q12H   busPIRone 5 mg Oral BID   cetirizine 5 mg Oral Daily   citalopram 20 mg Oral Nightly   furosemide 20 mg Oral Daily   gabapentin 200 mg Oral BID   ipratropium-albuterol 3 mL Nebulization TID - RT   melatonin 2.5 mg Oral Nightly   mirtazapine 7.5 mg Oral Nightly   nicotine 1 patch Transdermal Q24H   pantoprazole 40 mg Oral Q AM   potassium chloride 20 mEq Oral BID   sennosides-docusate sodium 2 tablet Oral BID   tranexamic acid 1,000 mg Intravenous Once   vitamin D 50,000 Units Oral Q14 Days     Physical Exam   Constitutional: She appears well-developed and well-nourished.   obese   HENT:   Head: Normocephalic and atraumatic.   Eyes: EOM are normal. Pupils are equal, round, and reactive to light.   Cardiovascular: Regular rhythm and normal heart sounds.  Exam reveals no gallop and no friction rub.    No murmur heard.  tachycardic   Pulmonary/Chest:   Diminished air movement, expiratory wheezing throughout   Abdominal: Soft. Bowel sounds are normal. She exhibits no distension. There is no tenderness.   obese   Musculoskeletal:   Trace edema LLE   Neurological:   Awakens easily, but very sleepy   Skin: Skin is warm and dry. No erythema.   Incision " not visualized, dressing clean, dry and intact   Vitals reviewed.    LAB/DIAGNOSTICS:    Lab Results (last 24 hours)     Procedure Component Value Units Date/Time    CBC & Differential [[id number]] Collected:  04/12/17 0429    Specimen:  Blood Updated:  04/12/17 0442    Narrative:       The following orders were created for panel order CBC & Differential.  Procedure                               Abnormality         Status                     ---------                               -----------         ------                     CBC Auto Differential[[id number]]         Abnormal            Final result                 Please view results for these tests on the individual orders.    CBC Auto Differential [[id number]]  (Abnormal) Collected:  04/12/17 0429    Specimen:  Blood Updated:  04/12/17 0442     WBC 20.33 (H) 10*3/mm3      RBC 3.66 (L) 10*6/mm3      Hemoglobin 9.0 (L) g/dL      Hematocrit 30.5 (L) %      MCV 83.3 fL      MCH 24.6 (L) pg      MCHC 29.5 (L) g/dL      RDW 16.6 (H) %      RDW-SD 50.1 fl      MPV 11.5 (H) fL      Platelets 200 10*3/mm3      Neutrophil % 74.7 (H) %      Lymphocyte % 10.9 (L) %      Monocyte % 13.8 (H) %      Eosinophil % 0.0 %      Basophil % 0.2 %      Immature Grans % 0.4 %      Neutrophils, Absolute 15.18 (H) 10*3/mm3      Lymphocytes, Absolute 2.22 10*3/mm3      Monocytes, Absolute 2.80 (H) 10*3/mm3      Eosinophils, Absolute 0.00 (L) 10*3/mm3      Basophils, Absolute 0.05 10*3/mm3      Immature Grans, Absolute 0.08 (H) 10*3/mm3      nRBC 0.0 /100 WBC     aPTT [[id number]]  (Normal) Collected:  04/12/17 0429    Specimen:  Blood Updated:  04/12/17 0456     PTT 27.9 seconds     Narrative:       PTT = The equivalent PTT values for the therapeutic range of heparin levels at 0.1 to 0.7 U/ml are 53 to 110 seconds.    Wound Culture [[id number]]  (Normal) Collected:  04/11/17 1133    Specimen:  Wound from Hip, Left Updated:  04/12/17 0972     Wound Culture No growth at 24 hours     Gram Stain Result  No WBCs or organisms seen        ASSESSMENT/PLAN:  1. S/P LTHA: POD 1, management per Dr. Chavez  Pain/PT/OT/DVT prophy with Eliquis 2.5 mg every 12 hoursper Dr. Chavez  Wound culture pending    2. COPD with acute on chronic respiratory failure:  Sats 88% on 2 liters while sleeping, suspect BASIL however has had pain medication  Likely needs outpatient study after discharge  Wean oxygen as tolerated  CXR now  Increase duonebs to every 4 hours, add acapella, monitor    3. GERD: no acute issues on PPI daily    4. Anxiety/depression with h/o psychosis: no acute issues on home buspar 5 mg twice daily/celexa 20 mg nightly/lorazepam 0.5 mg every 8 hours as needed/mirtazapine 7.5 mg nightly/    5. Peripheral neuropathy: no acute issues on home gabapentin 200 mg twice daily    6. Lumbosacral disc disease: no acute issues, already on pain medication as per number 1    7. Tobacco abuse: nicotine patch ordered, too sleepy to  today    8. Leukocytosis: afebrile, likely reactive  Check CXR and UA C&S  Monitor    9. Chronic Anemia:   Hgb 9.0, no active blood loss  monitor    8. Vitamin D Deficiency: no meds noted, no acute issues

## 2017-04-12 NOTE — SIGNIFICANT NOTE
04/12/17 1324   Rehab Treatment   Discipline physical therapist   Rehab Evaluation   Evaluation Not Performed other (see comments)  (Hold PT evaluation. Patient continues to have no active movement of L LE. Patient able to flex/extend toes on R however demonstrates minimal active ankle movement. Patient demonstrates confusion and lethargy. Not safe to attempt PT evaluation at this time.Nursing notified. Nursing to notify MD and anesthesia )

## 2017-04-12 NOTE — PLAN OF CARE
Problem: Patient Care Overview (Adult)  Goal: Plan of Care Review  Outcome: Ongoing (interventions implemented as appropriate)    04/12/17 0314   Coping/Psychosocial Response Interventions   Plan Of Care Reviewed With patient   Patient Care Overview   Progress improving

## 2017-04-12 NOTE — PROGRESS NOTES
Patient: Mariangel Thomas  Procedure(s) with comments:  TOTAL HIP ARTHROPLASTY REVISION, depuy & baljeet, cultures taken, 7fr hemovac placement - 3 screws, cup, shell and head removed grossly intact  Anesthesia type: [unfilled]    Patient location: [facility]  Last vitals:   Vitals:    04/12/17 0613   BP: 100/61   Pulse: 104   Resp: 20   Temp: 98.3 °F (36.8 °C)   SpO2: 92%     Level of consciousness: sedated, but arousable    Post-anesthesia pain: adequate analgesia  Airway patency: patent  Respiratory: spontaneous ventilation, nasal cannula  Cardiovascular: stable  Hydration: euvolemic    Anesthetic complications: no

## 2017-04-12 NOTE — SIGNIFICANT NOTE
04/12/17 1000   Rehab Treatment   Discipline occupational therapist   Rehab Evaluation   Evaluation Not Performed (Patient with numbness and no motor control of LLE due to spinal block. Will check back later today.)

## 2017-04-12 NOTE — PLAN OF CARE
Problem: Patient Care Overview (Adult)  Goal: Discharge Needs Assessment  Outcome: Ongoing (interventions implemented as appropriate)    04/12/17 1044   Discharge Needs Assessment   Concerns To Be Addressed discharge planning concerns   Readmission Within The Last 30 Days no previous admission in last 30 days   Discharge Planning Comments Spoke with pt at bedside, facesheet verified. Pt states she uses cane and walker at [facility] [place] and plans to return to facility [facility]. She does not use 02 at facility. Spoke with Rupal at [facility] who informs pt has a medicaid bed hold and will accept pt at [facility]. Clinicals faxed per request. Will continue to follow.   Living Environment   Transportation Available (will continue to assess)   Self-Care   Equipment Currently Used at Home wheelchair;walker, standard

## 2017-04-12 NOTE — SIGNIFICANT NOTE
04/12/17 0935   Rehab Treatment   Discipline physical therapist   Rehab Evaluation   Evaluation Not Performed other (see comments)  (Patient continues to have numbess with no motor control of L LE secondary to spinal block. Unable to evaluate x 2. Will check back later today. )

## 2017-04-12 NOTE — NURSING NOTE
Juan Diego Mckinney Physical therapist reports that she is unable to work with pt today related to pt has no active movement in the Left Lower extremity, but pt can move rt leg/foot without difficulty .  Called and spoke with jacey, [phone] in anesthesia to make them aware of what pt reports and jacey stated he will come and see pt

## 2017-04-12 NOTE — PROGRESS NOTES
Orthopedic Progress Note   Chief Complaint: Status post revision acetabular component with femoral head exchange left total hip     Subjective     Interval History: Patient is postop day 1 doing well this morning she is afebrile hemodynamically stable hemoglobin 9.0.  Set 150 cc a Hemovac drainage.  Effective spinal still in effect she cannot move either lower extremity.          Objective     Vital Signs  Temp:  [97.6 °F (36.4 °C)-98.8 °F (37.1 °C)] 98.3 °F (36.8 °C)  Heart Rate:  [] 104  Resp:  [14-20] 20  BP: ()/(51-91) 100/61  Body mass index is 28.19 kg/(m^2).    Intake/Output Summary (Last 24 hours) at 04/12/17 0700  Last data filed at 04/12/17 0500   Gross per 24 hour   Intake             1850 ml   Output              725 ml   Net             1125 ml     I/O this shift:  In: -   Out: 150 [Other:150]       Physical Exam:   General: patient awake, alert and cooperative   Cardiovascular: regular rhythm and rate   Pulm: clear to auscultation bilaterally   Abdomen: Benign.  Soft bowel sounds   Extremities: Still has no feeling in either lower extremity.  She has good distal pulses.  Dressing is dry.   Neurologic: Normal mood and behavior     Results Review:     I reviewed the patient's new clinical results.      WBC No results found for: WBCS   HGB Hemoglobin   Date Value Ref Range Status   04/12/2017 9.0 (L) 12.0 - 16.0 g/dL Final   04/11/2017 11.5 (L) 12.0 - 16.0 g/dL Final      HCT Hematocrit   Date Value Ref Range Status   04/12/2017 30.5 (L) 37.0 - 47.0 % Final   04/11/2017 38.6 37.0 - 47.0 % Final      Platlets No results found for: LABPLAT     PT/INR:  No results found for: PROTIME/No results found for: INR    Sodium No results found for: NA   Potassium Potassium   Date Value Ref Range Status   04/11/2017 4.3 3.5 - 5.2 mmol/L Final      Chloride No results found for: CL   Bicarbonate No results found for: PLASMABICARB   BUN No results found for: BUN   Creatinine No results found for: CREATININE    Calcium No results found for: CALCIUM   Magnesium  AST  ALT  Bilirubin, Total  AlkPhos  Albumin    Amylase  Lipase    Radiology: No results found for: MG  No components found for: AST.*  No components found for: ALT.*  No components found for: BILIRUBIN, TOTAL.*    No components found for: ALKPHOS.*  No components found for: ALBUMIN.*      No components found for: AMYLASE.*  No components found for: LIPASE.*            Imaging Results (most recent)     Procedure Component Value Units Date/Time    XR Hip With or Without Pelvis 2 - 3 View Left [[id number]] Collected:  04/11/17 0901     Updated:  04/11/17 0905    Narrative:       LEFT HIP, 04/11/2017:     HISTORY:  56-year-old female status post left hip arthroplasty after hip fracture  December 2015. Preoperative evaluation prior to reported revision hip  surgery.     TECHNIQUE:  Two view left hip series.     FINDINGS:  The examination shows significant chronic protrusion of the acetabular  cup component into the hip with some surrounding ossification. The  femoral component is well seated, and there is no hip dislocation or  subluxation. No visible fracture involving the remainder of the  visualized left hemipelvis.       Impression:       1. 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  2. No acute osseous abnormality.     This report was finalized on 4/11/2017 9:03 AM by Dr. Jerome Espino MD.       XR Hip With or Without Pelvis 1 View Left [[id number]] Collected:  04/11/17 1418     Updated:  04/11/17 1421    Narrative:       POSTOP LEFT HIP, 04/11/2017:     HISTORY:  Postop revision left hip arthroplasty.     TECHNIQUE:  Single AP radiograph of the left hip was obtained portably following  surgery.     FINDINGS:  Postop changes revision left hip arthroplasty. No visible fracture or  dislocation.     This report was finalized on 4/11/2017 2:18 PM by Dr. Jerome Espino MD.                  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  Begin PT OT once feeling and lower extremities return.  Should be ready to return to nursing facility in the next 48 hours.      Piter Chavez MD  04/12/17  7:00 AM

## 2017-04-13 LAB
ALBUMIN SERPL-MCNC: 2.6 G/DL (ref 3.5–5.2)
ALBUMIN/GLOB SERPL: 0.7 G/DL
ALP SERPL-CCNC: 79 U/L (ref 40–129)
ALT SERPL W P-5'-P-CCNC: 13 U/L (ref 5–33)
ANION GAP SERPL CALCULATED.3IONS-SCNC: 6.3 MMOL/L
AST SERPL-CCNC: 26 U/L (ref 5–32)
BASOPHILS # BLD AUTO: 0.06 10*3/MM3 (ref 0–0.2)
BASOPHILS NFR BLD AUTO: 0.4 % (ref 0–2)
BILIRUB SERPL-MCNC: 0.4 MG/DL (ref 0.2–1.2)
BUN BLD-MCNC: 10 MG/DL (ref 6–20)
BUN/CREAT SERPL: 13 (ref 7–25)
CALCIUM SPEC-SCNC: 8.3 MG/DL (ref 8.6–10.5)
CHLORIDE SERPL-SCNC: 98 MMOL/L (ref 98–107)
CO2 SERPL-SCNC: 29.7 MMOL/L (ref 22–29)
CREAT BLD-MCNC: 0.77 MG/DL (ref 0.57–1)
DEPRECATED RDW RBC AUTO: 51.6 FL (ref 37–54)
EOSINOPHIL # BLD AUTO: 0.16 10*3/MM3 (ref 0.1–0.3)
EOSINOPHIL NFR BLD AUTO: 1.1 % (ref 0–4)
ERYTHROCYTE [DISTWIDTH] IN BLOOD BY AUTOMATED COUNT: 16.9 % (ref 11.5–14.5)
GFR SERPL CREATININE-BSD FRML MDRD: 77 ML/MIN/1.73
GLOBULIN UR ELPH-MCNC: 3.7 GM/DL
GLUCOSE BLD-MCNC: 216 MG/DL (ref 65–99)
HBA1C MFR BLD: 6.4 % (ref 4.8–5.6)
HCT VFR BLD AUTO: 26.4 % (ref 37–47)
HGB BLD-MCNC: 7.8 G/DL (ref 12–16)
IMM GRANULOCYTES # BLD: 0.16 10*3/MM3 (ref 0–0.03)
IMM GRANULOCYTES NFR BLD: 1.1 % (ref 0–0.5)
LYMPHOCYTES # BLD AUTO: 2.37 10*3/MM3 (ref 0.6–4.8)
LYMPHOCYTES NFR BLD AUTO: 16.6 % (ref 20–45)
MCH RBC QN AUTO: 24.9 PG (ref 27–31)
MCHC RBC AUTO-ENTMCNC: 29.5 G/DL (ref 31–37)
MCV RBC AUTO: 84.3 FL (ref 81–99)
MONOCYTES # BLD AUTO: 2.12 10*3/MM3 (ref 0–1)
MONOCYTES NFR BLD AUTO: 14.8 % (ref 3–8)
NEUTROPHILS # BLD AUTO: 9.42 10*3/MM3 (ref 1.5–8.3)
NEUTROPHILS NFR BLD AUTO: 66 % (ref 45–70)
NRBC BLD MANUAL-RTO: 0 /100 WBC (ref 0–0)
PLATELET # BLD AUTO: 153 10*3/MM3 (ref 140–500)
PMV BLD AUTO: 12.6 FL (ref 7.4–10.4)
POTASSIUM BLD-SCNC: 4.7 MMOL/L (ref 3.5–5.2)
PROT SERPL-MCNC: 6.3 G/DL (ref 6–8.5)
RBC # BLD AUTO: 3.13 10*6/MM3 (ref 4.2–5.4)
SODIUM BLD-SCNC: 134 MMOL/L (ref 136–145)
WBC NRBC COR # BLD: 14.29 10*3/MM3 (ref 4.8–10.8)

## 2017-04-13 PROCEDURE — 97162 PT EVAL MOD COMPLEX 30 MIN: CPT

## 2017-04-13 PROCEDURE — 80053 COMPREHEN METABOLIC PANEL: CPT | Performed by: NURSE PRACTITIONER

## 2017-04-13 PROCEDURE — 97110 THERAPEUTIC EXERCISES: CPT

## 2017-04-13 PROCEDURE — 83036 HEMOGLOBIN GLYCOSYLATED A1C: CPT | Performed by: HOSPITALIST

## 2017-04-13 PROCEDURE — 94799 UNLISTED PULMONARY SVC/PX: CPT

## 2017-04-13 PROCEDURE — 99232 SBSQ HOSP IP/OBS MODERATE 35: CPT | Performed by: HOSPITALIST

## 2017-04-13 PROCEDURE — 97166 OT EVAL MOD COMPLEX 45 MIN: CPT

## 2017-04-13 PROCEDURE — 85025 COMPLETE CBC W/AUTO DIFF WBC: CPT | Performed by: ORTHOPAEDIC SURGERY

## 2017-04-13 PROCEDURE — 25010000002 HYDROMORPHONE PER 4 MG: Performed by: ORTHOPAEDIC SURGERY

## 2017-04-13 PROCEDURE — 99024 POSTOP FOLLOW-UP VISIT: CPT | Performed by: ORTHOPAEDIC SURGERY

## 2017-04-13 RX ADMIN — IPRATROPIUM BROMIDE AND ALBUTEROL SULFATE 3 ML: .5; 3 SOLUTION RESPIRATORY (INHALATION) at 10:55

## 2017-04-13 RX ADMIN — OXYCODONE HYDROCHLORIDE 10 MG: 5 TABLET ORAL at 14:01

## 2017-04-13 RX ADMIN — GABAPENTIN 200 MG: 100 CAPSULE ORAL at 18:59

## 2017-04-13 RX ADMIN — OXYCODONE HYDROCHLORIDE 10 MG: 5 TABLET ORAL at 06:24

## 2017-04-13 RX ADMIN — DOCUSATE SODIUM AND SENNOSIDES 2 TABLET: 8.6; 5 TABLET, FILM COATED ORAL at 09:26

## 2017-04-13 RX ADMIN — IPRATROPIUM BROMIDE AND ALBUTEROL SULFATE 3 ML: .5; 3 SOLUTION RESPIRATORY (INHALATION) at 23:02

## 2017-04-13 RX ADMIN — POTASSIUM CHLORIDE 20 MEQ: 20 TABLET, EXTENDED RELEASE ORAL at 19:00

## 2017-04-13 RX ADMIN — ACETAMINOPHEN 1000 MG: 500 TABLET ORAL at 02:38

## 2017-04-13 RX ADMIN — CITALOPRAM HYDROBROMIDE 20 MG: 20 TABLET ORAL at 20:10

## 2017-04-13 RX ADMIN — ACETAMINOPHEN 1000 MG: 500 TABLET ORAL at 09:24

## 2017-04-13 RX ADMIN — MIRTAZAPINE 7.5 MG: 15 TABLET, FILM COATED ORAL at 20:10

## 2017-04-13 RX ADMIN — PANTOPRAZOLE SODIUM 40 MG: 40 TABLET, DELAYED RELEASE ORAL at 06:24

## 2017-04-13 RX ADMIN — POTASSIUM CHLORIDE 20 MEQ: 20 TABLET, EXTENDED RELEASE ORAL at 09:25

## 2017-04-13 RX ADMIN — FUROSEMIDE 20 MG: 20 TABLET ORAL at 09:26

## 2017-04-13 RX ADMIN — MELATONIN 5 MG TABLET 2.5 MG: at 20:10

## 2017-04-13 RX ADMIN — CETIRIZINE HYDROCHLORIDE 5 MG: 10 TABLET, FILM COATED ORAL at 09:26

## 2017-04-13 RX ADMIN — ACETAMINOPHEN 1000 MG: 500 TABLET ORAL at 20:10

## 2017-04-13 RX ADMIN — APIXABAN 2.5 MG: 2.5 TABLET, FILM COATED ORAL at 20:10

## 2017-04-13 RX ADMIN — BUSPIRONE HYDROCHLORIDE 5 MG: 5 TABLET ORAL at 09:26

## 2017-04-13 RX ADMIN — IPRATROPIUM BROMIDE AND ALBUTEROL SULFATE 3 ML: .5; 3 SOLUTION RESPIRATORY (INHALATION) at 14:49

## 2017-04-13 RX ADMIN — IPRATROPIUM BROMIDE AND ALBUTEROL SULFATE 3 ML: .5; 3 SOLUTION RESPIRATORY (INHALATION) at 07:02

## 2017-04-13 RX ADMIN — DOCUSATE SODIUM AND SENNOSIDES 2 TABLET: 8.6; 5 TABLET, FILM COATED ORAL at 19:00

## 2017-04-13 RX ADMIN — IPRATROPIUM BROMIDE AND ALBUTEROL SULFATE 3 ML: .5; 3 SOLUTION RESPIRATORY (INHALATION) at 19:29

## 2017-04-13 RX ADMIN — NICOTINE 1 PATCH: 14 PATCH, EXTENDED RELEASE TRANSDERMAL at 16:38

## 2017-04-13 RX ADMIN — ACETAMINOPHEN 1000 MG: 500 TABLET ORAL at 15:55

## 2017-04-13 RX ADMIN — BUSPIRONE HYDROCHLORIDE 5 MG: 5 TABLET ORAL at 18:59

## 2017-04-13 RX ADMIN — GABAPENTIN 200 MG: 100 CAPSULE ORAL at 09:25

## 2017-04-13 RX ADMIN — OXYCODONE HYDROCHLORIDE 10 MG: 5 TABLET ORAL at 10:29

## 2017-04-13 RX ADMIN — APIXABAN 2.5 MG: 2.5 TABLET, FILM COATED ORAL at 09:25

## 2017-04-13 RX ADMIN — OXYCODONE HYDROCHLORIDE 10 MG: 5 TABLET ORAL at 19:35

## 2017-04-13 RX ADMIN — OXYCODONE HYDROCHLORIDE 10 MG: 5 TABLET ORAL at 23:31

## 2017-04-13 RX ADMIN — HYDROMORPHONE HYDROCHLORIDE 1 MG: 1 INJECTION, SOLUTION INTRAMUSCULAR; INTRAVENOUS; SUBCUTANEOUS at 02:38

## 2017-04-13 NOTE — PROGRESS NOTES
Acute Care - Physical Therapy Initial Evaluation  [facility]     Patient Name: Mariangel Thomas  : 1960  MRN: [id number]  Today's Date: 2017   Onset of Illness/Injury or Date of Surgery Date: 17  Date of Referral to PT: 17  Referring Physician: Dr. Chavez      Admit Date: 2017     Visit Dx:    ICD-10-CM ICD-9-CM   1. 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 718.65     Patient Active Problem List   Diagnosis   • Failed total hip arthroplasty   • Acetabular protrusion     Past Medical History:   Diagnosis Date   • Anxiety    • Chronic respiratory failure    • COPD (chronic obstructive pulmonary disease)    • Disc disorder     lumbar/thoracic   • Fracture of hip     sched total hip revision   • GERD (gastroesophageal reflux disease)    • Incontinent of urine     incontinent of bowel & bladder   • Insomnia    • Kidney failure    • Lumbosacral disc disease    • Muscle weakness     generalized   • Neuropathy    • OA (osteoarthritis)    • Psychosis    • Severe sepsis without septic shock      Past Surgical History:   Procedure Laterality Date   •  SECTION     • CHOLECYSTECTOMY     • TOTAL HIP ARTHROPLASTY Left 2015   • TUBAL ABDOMINAL LIGATION            PT ASSESSMENT (last 72 hours)      PT Evaluation       17 0936 17 0854    Rehab Evaluation    Document Type evaluation  -BP     Subjective Information agree to therapy;complains of;pain  -BP     Evaluation Not Performed      Patient Effort, Rehab Treatment adequate  -BP     Symptoms Noted During/After Treatment increased pain  -BP     General Information    Patient Profile Review yes  -BP     Onset of Illness/Injury or Date of Surgery Date 17  -BP     Referring Physician Dr. Chavez  -BP     General Observations Patient supine with HOB elevated. Nurse in room. ABD pillow in use.   -BP     Pertinent History Of Current Problem Patient s/p JIM revision. Original JIM performed in 2015. Patient is a resident at [facility]. She states she performed sit pivot transfers to/from w/c only. She does not ambulate. She states she self propels w/c and performs bed mobility without assist. At baseline she reports minimal active DF of R ankle.   -BP     Precautions/Limitations hip precautions- left   Patient unable to recall hip precautions from pior surgery  -BP     Prior Level of Function --   see pertinent history of current problem  -BP     Equipment Currently Used at Home wheelchair;hospital bed  -BP     Plans/Goals Discussed With patient;agreed upon  -BP     Risks Reviewed patient:;increased discomfort  -BP     Benefits Reviewed patient:;improve function;increase independence;increase strength  -BP     Barriers to Rehab previous functional deficit;cognitive status  -BP     Living Environment    Lives With facility resident  -BP     Living Arrangements extended care facility   [facility]  -BP     Home Accessibility no concerns  -BP     Clinical Impression    Date of Referral to PT 04/11/17  -BP     PT Diagnosis Decreased functional mobility  -BP     Criteria for Skilled Therapeutic Interventions Met yes;treatment indicated  -BP     Rehab Potential good, to achieve stated therapy goals  -BP     Pain Assessment    Pain Assessment 0-10  -BP     Pain Score 9  -BP     Post Pain Score 8  -BP     Pain Type Acute pain;Surgical pain  -BP     Pain Location Hip  -BP     Pain Orientation Left  -BP     Pain Intervention(s) Repositioned  -BP     Response to Interventions tolerated  -BP     Cognitive Assessment/Intervention    Current Cognitive/Communication Assessment impaired  -BP     Orientation Status oriented to;person;place;time  -BP     Follows Commands/Answers Questions able to follow single-step instructions;needs cueing;needs repetition  -BP     Personal Safety decreased awareness, need for safety;decreased awareness, need for assist  -BP     Personal Safety Interventions nonskid shoes/slippers when out of bed;gait  belt  -BP     ROM (Range of Motion)    General ROM Detail R hip and Knee WFL. R Ankle Active DF to neutral. Patient with no active L ankle DF, no active toe flexion/extension noted. L knee WFL. L hip deferred  -BP     MMT (Manual Muscle Testing)    General MMT Assessment Detail R LE strength functional. L LE MMT deferred  -BP     Bed Mobility, Assessment/Treatment    Bed Mobility, Assistive Device bed rails;head of bed elevated;draw sheet  -BP     Bed Mob, Supine to Sit, [place] maximum assist (25% patient effort);2 person assist required;verbal cues required  -BP     Bed Mob, Sit to Supine, Walsh dependent (less than 25% patient effort);verbal cues required   Assist of 3 required   -BP     Bed Mobility, Comment Patient requires verbal cues for sequencing   -BP     Transfer Assessment/Treatment    Transfers, Sit-Stand Walsh maximum assist (25% patient effort);2 person assist required;verbal cues required  -BP     Transfers, Stand-Sit Walsh maximum assist (25% patient effort);2 person assist required;verbal cues required  -BP     Transfers, Sit-Stand-Sit, Assist Device standard walker  -BP     Transfer, Comment Patient requires verbal cues for hand placement. Requires elevated bed surface. Transferred to stand x 4 to perform brief change, bed change and personal hygiene. Patient required min A/mod A x 2 to maintain static standing. Unable to safely perforn bed to chair transfer this am.   -BP     Gait Assessment/Treatment    Gait, Comment deferred. Not safe to attempt at this time.   -BP     Sensory Assessment/Intervention    Sensory Impairment --   denies numbness and tingling. In tact to light touch in L LE  -BP     Positioning and Restraints    Pre-Treatment Position in bed  -BP     Post Treatment Position bed  -BP     In Bed supine;call light within reach;encouraged to call for assist   with CNA  -BP       User Key  (r) = Recorded By, (t) = Taken By, (c) = Cosigned By    Initials Name  Provider Type    EN Daniella Miller, OTR Occupational Therapist    LAXMI Munguia, RN Case Manager    SD Mukul Shi, OTR Occupational Therapist    BRANDIE Chavez, RN Registered Nurse    BP Rae Mckinney, PT Physical Therapist    CR Silvia Patterson, RN Registered Nurse    CT Arianna Marrero, RN Registered Nurse    HIMA Steven, RN Registered Nurse     Izabel Gupta, OT Student OT Student          Physical Therapy Education     Title: PT OT SLP Therapies (Active)     Topic: Physical Therapy (Active)     Point: Mobility training (Active)    Learning Progress Summary    Learner Readiness Method Response Comment Documented by Status   Patient Acceptance E NR   04/13/17 1030 Active               Point: Precautions (Active)    Learning Progress Summary    Learner Readiness Method Response Comment Documented by Status   Patient Acceptance E NR   04/13/17 1030 Active                      User Key     Initials Effective Dates Name Provider Type Discipline     12/01/15 -  Rae Mckinney, PT Physical Therapist PT                PT Recommendation and Plan  Anticipated Equipment Needs At Discharge:  (none)  Anticipated Discharge Disposition: skilled nursing facility, extended care facility (Plans to return to [facility])  Planned Therapy Interventions: bed mobility training, strengthening, patient/family education, transfer training  PT Frequency: 2 times/day  Plan of Care Review  Plan Of Care Reviewed With: patient  Outcome Summary/Follow up Plan: PT Evaluation Complete: Patient completes supine to sit transfer with Max A x 3, sit to supine depenent x 3, and sit to/from stand transfers with max A x 2. Patient unable to any hip precautions following education. Patient with no active L ankle DF, sensation in tact.  Patient would benefit from skilled PT services to address functional mobility and LE strength deficits. Plan to see patient 2x/day. Patient would benefit from  short term rehab with transition to LTC.           IP PT Goals       04/13/17 1034          Bed Mobility PT STG    Bed Mobility PT STG, Date Established 04/13/17  -BP      Bed Mobility PT STG, Time to Achieve 3 days  -BP      Bed Mobility PT STG, Activity Type supine to sit/sit to supine  -BP      Bed Mobility PT STG, [place] Level maximum assist (25% patient effort)  -BP      Transfer Training PT STG    Transfer Training PT STG, Date Established 04/13/17  -BP      Transfer Training PT STG, Time to Achieve 3 days  -BP      Transfer Training PT STG, Activity Type sit to stand/stand to sit  -BP      Transfer Training PT STG, [place] Level maximum assist (25% patient effort)  -BP      Transfer Training 2 PT STG    Transfer Training PT 2 STG, Date Established 04/13/17  -BP      Transfer Training PT 2 STG, Time to Achieve 3 days  -BP      Transfer Training PT 2 STG, Activity Type bed to chair /chair to bed  -BP      Transfer Training PT 2 STG, [place] Level maximum assist (25% patient effort);2 person assist required  -BP      Transfer Training PT 2 STG, Assist Device --   appropriate assistive device  -BP        User Key  (r) = Recorded By, (t) = Taken By, (c) = Cosigned By    Initials Name Provider Type    BP Rae Mckinney, PT Physical Therapist                Outcome Measures       04/13/17 0936          How much help from another person do you currently need...    Turning from your back to your side while in flat bed without using bedrails? 1  -BP      Moving from lying on back to sitting on the side of a flat bed without bedrails? 1  -BP      Moving to and from a bed to a chair (including a wheelchair)? 1  -BP      Standing up from a chair using your arms (e.g., wheelchair, bedside chair)? 1  -BP      Climbing 3-5 steps with a railing? 1  -BP      To walk in hospital room? 1  -BP      AM-PAC 6 Clicks Score 6  -BP      Functional Assessment    Outcome Measure Options AM-PAC 6 Clicks Basic Mobility  (PT)  -BP        User Key  (r) = Recorded By, (t) = Taken By, (c) = Cosigned By    Initials Name Provider Type    BP Rae Mckinney PT Physical Therapist           Time Calculation:         PT Charges       04/13/17 1035          Time Calculation    Start Time 0936  -BP      PT Received On 04/13/17  -BP      PT - Next Appointment 04/13/17  -BP        User Key  (r) = Recorded By, (t) = Taken By, (c) = Cosigned By    Initials Name Provider Type    BP Rae Mckinney PT Physical Therapist          Therapy Charges for Today     Code Description Service Date Service Provider Modifiers Qty    00666942803 HC PT EVAL MOD COMPLEXITY 2 4/13/2017 Rae Mckinney, PT GP 1          PT G-Codes  Outcome Measure Options: AM-PAC 6 Clicks Basic Mobility (PT)      Rae Mckinney PT  4/13/2017

## 2017-04-13 NOTE — PROGRESS NOTES
"Hospitalist Team      Patient Care Team:  Antoine Talavera Jr., MD as PCP - General  Antoine Talavera Jr., MD as PCP - Family Medicine        Chief Complaint: Follow-up Acetabular protrusion    Subjective    Noting pain this afternoon, but denies chest pain and dyspnea.  Tolerating PO.  Worked with PT today.     Objective    Vital Signs  Temp:  [99.1 °F (37.3 °C)-100.6 °F (38.1 °C)] 99.1 °F (37.3 °C)  Heart Rate:  [] 100  Resp:  [20] 20  BP: (113-126)/(70-84) 126/84  Oxygen Therapy  SpO2: 94 %  Pulse Oximetry Type: Continuous  O2 Device: nasal cannula  Flow (L/min): 3  Oxygen Concentration (%): 3  EtCO2 (mm Hg) (Respiratory Monitoring): 45}    Flowsheet Rows         First Filed Value    Admission Height  67\" (170.2 cm) Documented at 04/05/2017 1602    Admission Weight  180 lb (81.6 kg) Documented at 04/05/2017 1602          Physical Exam:    General Appearance:    Alert, cooperative, in no acute distress   Lungs:     Breath sounds are coarse, but I appreciate no wheeze or rhonchi.    Heart:    Regular rhythm and normal rate, normal S1 and S2, no            murmur, no gallop, no rub, no click   Abdomen:     Obese, soft and non-tender w/ active bowel sounds   Extremities:   No clubbing or cyanosis   Pulses:   Radial pulses palpable and equal bilaterally   Neurologic:   Cranial nerves 2 - 12 grossly intact       Results Review:     I reviewed the patient's new clinical results.    Lab Results (last 24 hours)     Procedure Component Value Units Date/Time    Comprehensive Metabolic Panel [[id number]]  (Abnormal) Collected:  04/12/17 1747    Specimen:  Blood Updated:  04/12/17 1818     Glucose 190 (H) mg/dL      BUN 16 mg/dL      Creatinine 0.99 mg/dL      Sodium 134 (L) mmol/L      Potassium 5.0 mmol/L      Chloride 98 mmol/L      CO2 29.1 (H) mmol/L      Calcium 8.9 mg/dL      Total Protein 6.4 g/dL      Albumin 2.80 (L) g/dL      ALT (SGPT) 15 U/L      AST (SGOT) 27 U/L      Alkaline Phosphatase 79 U/L      Total " Bilirubin 0.5 mg/dL      eGFR Non African Amer 58 (L) mL/min/1.73      Globulin 3.6 gm/dL      A/G Ratio 0.8 g/dL      BUN/Creatinine Ratio 16.2     Anion Gap 6.9 mmol/L     CBC & Differential [[id number]] Collected:  04/13/17 0408    Specimen:  Blood Updated:  04/13/17 0457    Narrative:       The following orders were created for panel order CBC & Differential.  Procedure                               Abnormality         Status                     ---------                               -----------         ------                     CBC Auto Differential[[id number]]         Abnormal            Final result                 Please view results for these tests on the individual orders.    CBC Auto Differential [[id number]]  (Abnormal) Collected:  04/13/17 0408    Specimen:  Blood Updated:  04/13/17 0457     WBC 14.29 (H) 10*3/mm3      RBC 3.13 (L) 10*6/mm3      Hemoglobin 7.8 (L) g/dL      Hematocrit 26.4 (L) %      MCV 84.3 fL      MCH 24.9 (L) pg      MCHC 29.5 (L) g/dL      RDW 16.9 (H) %      RDW-SD 51.6 fl      MPV 12.6 (H) fL      Platelets 153 10*3/mm3      Neutrophil % 66.0 %      Lymphocyte % 16.6 (L) %      Monocyte % 14.8 (H) %      Eosinophil % 1.1 %      Basophil % 0.4 %      Immature Grans % 1.1 (H) %      Neutrophils, Absolute 9.42 (H) 10*3/mm3      Lymphocytes, Absolute 2.37 10*3/mm3      Monocytes, Absolute 2.12 (H) 10*3/mm3      Eosinophils, Absolute 0.16 10*3/mm3      Basophils, Absolute 0.06 10*3/mm3      Immature Grans, Absolute 0.16 (H) 10*3/mm3      nRBC 0.0 /100 WBC     Comprehensive Metabolic Panel [[id number]]  (Abnormal) Collected:  04/13/17 0408    Specimen:  Blood Updated:  04/13/17 0507     Glucose 216 (H) mg/dL      BUN 10 mg/dL      Creatinine 0.77 mg/dL      Sodium 134 (L) mmol/L      Potassium 4.7 mmol/L      Chloride 98 mmol/L      CO2 29.7 (H) mmol/L      Calcium 8.3 (L) mg/dL      Total Protein 6.3 g/dL      Albumin 2.60 (L) g/dL      ALT (SGPT) 13 U/L      AST (SGOT) 26 U/L      Alkaline  Phosphatase 79 U/L      Total Bilirubin 0.4 mg/dL      eGFR Non African Amer 77 mL/min/1.73      Globulin 3.7 gm/dL      A/G Ratio 0.7 g/dL      BUN/Creatinine Ratio 13.0     Anion Gap 6.3 mmol/L     Wound Culture [[id number]]  (Normal) Collected:  04/11/17 1133    Specimen:  Wound from Hip, Left Updated:  04/13/17 1026     Wound Culture No growth at 2 days     Gram Stain Result No WBCs or organisms seen          Imaging Results (last 24 hours)     Procedure Component Value Units Date/Time    XR Chest 1 View [[id number]] Collected:  04/13/17 0753     Updated:  04/13/17 0757    Narrative:       INDICATION: Shortness of air status post hip surgery. Postop day #1.      COMPARISON:  12/10/2015     FINDINGS:  Single portable AP view of the chest.     Heart and mediastinal contours are unchanged. There is a dense airspace  opacity in the right midlung, increased from the prior study. Linear  airspace opacities in the left midlung are fairly similar to the prior  study.. No pneumothorax or pleural effusion.       Impression:       Increased airspace opacity in the right mid lung may represent  atelectasis or developing infiltrate.     This report was finalized on 4/13/2017 7:55 AM by Dr. Cliff Campos MD.             Medication Review:   I have reviewed the patient's current medication list    Current Facility-Administered Medications:   •  acetaminophen (TYLENOL) tablet 1,000 mg, 1,000 mg, Oral, Q6H, Piter Chavez MD, 1,000 mg at 04/13/17 0924  •  apixaban (ELIQUIS) tablet 2.5 mg, 2.5 mg, Oral, Q12H, Piter Chavez MD, 2.5 mg at 04/13/17 0925  •  bisacodyl (DULCOLAX) EC tablet 10 mg, 10 mg, Oral, Daily PRN, Piter Chavez MD  •  bisacodyl (DULCOLAX) suppository 10 mg, 10 mg, Rectal, Daily PRN, Piter Chavez MD  •  busPIRone (BUSPAR) tablet 5 mg, 5 mg, Oral, BID, Piter Chavez MD, 5 mg at 04/13/17 0926  •  cetirizine (zyrTEC) tablet 5 mg, 5 mg, Oral, Daily, Piter Chavez MD, 5 mg at 04/13/17 0942  •  citalopram (CeleXA) tablet  20 mg, 20 mg, Oral, Nightly, Piter Chavez MD, 20 mg at 04/12/17 2045  •  diphenhydrAMINE (BENADRYL) capsule 25 mg, 25 mg, Oral, Q8H PRN, Piter Chavez MD  •  diphenhydrAMINE (BENADRYL) injection 12.5 mg, 12.5 mg, Intravenous, Q15 Min PRN, Billy Julian, CRNA  •  furosemide (LASIX) tablet 20 mg, 20 mg, Oral, Daily, Piter Chavez MD, 20 mg at 04/13/17 0926  •  gabapentin (NEURONTIN) capsule 200 mg, 200 mg, Oral, BID, Piter Chavez MD, 200 mg at 04/13/17 0925  •  HYDROmorphone (DILAUDID) injection 1 mg, 1 mg, Intravenous, Q2H PRN, Piter Chavez MD, 1 mg at 04/13/17 0238  •  ipratropium-albuterol (DUO-NEB) nebulizer solution 3 mL, 3 mL, Nebulization, Once PRN, Billy Julian, CRNA  •  ipratropium-albuterol (DUO-NEB) nebulizer solution 3 mL, 3 mL, Nebulization, Q4H - RT, Rosanna Bowen, APRN, 3 mL at 04/13/17 1449  •  LORazepam (ATIVAN) tablet 0.5 mg, 0.5 mg, Oral, Q8H PRN, Piter Chavez MD, 0.5 mg at 04/11/17 1659  •  magnesium hydroxide (MILK OF MAGNESIA) 400 MG/5ML suspension 30 mL, 30 mL, Oral, Q6H PRN, Piter Chavez MD  •  melatonin tablet 2.5 mg, 2.5 mg, Oral, Nightly, Piter Chavez MD, 2.5 mg at 04/12/17 2045  •  mirtazapine (REMERON) tablet 7.5 mg, 7.5 mg, Oral, Nightly, Piter Chavez MD, 7.5 mg at 04/12/17 2044  •  naloxone (NARCAN) injection 0.4 mg, 0.4 mg, Intravenous, PRN, Billy Julian, CRNA  •  nicotine (NICODERM CQ) 14 MG/24HR patch 1 patch, 1 patch, Transdermal, Q24H, Rosanna Bowen, APRN, 1 patch at 04/12/17 1639  •  ondansetron (ZOFRAN) injection 4 mg, 4 mg, Intravenous, Once PRN, Billy Julian CRNA  •  ondansetron (ZOFRAN) tablet 4 mg, 4 mg, Oral, Q6H PRN, Piter Chavez MD  •  oxyCODONE (ROXICODONE) immediate release tablet 10 mg, 10 mg, Oral, Q4H PRN, Piter Chavez MD, 10 mg at 04/13/17 1401  •  pantoprazole (PROTONIX) EC tablet 40 mg, 40 mg, Oral, Q AM, Piter Chavez MD, 40 mg at 04/13/17 0624  •  potassium chloride (K-DUR,KLOR-CON) CR tablet 20 mEq, 20 mEq, Oral, BID,  Piter Chavez MD, 20 mEq at 04/13/17 0963  •  sennosides-docusate sodium (SENOKOT-S) 8.6-50 MG tablet 2 tablet, 2 tablet, Oral, BID, Piter Chavez MD, 2 tablet at 04/13/17 0926  •  [COMPLETED] Tranexamic Acid 1,000 mg in sodium chloride 0.9 % 100 mL, 1,000 mg, Intravenous, Once, 1,000 mg at 04/11/17 1315 **FOLLOWED BY** Tranexamic Acid 1,000 mg in sodium chloride 0.9 % 100 mL, 1,000 mg, Intravenous, Once, Piter Chavez MD  •  vitamin D (ERGOCALCIFEROL) capsule 50,000 Units, 50,000 Units, Oral, Q14 Days, Piter Chavez MD, 50,000 Units at 04/11/17 1659      Assessment/Plan     1. Left Acetabular Protrusion s/p LTHA:  Patient reports some pain, but appears comfortable (watching Friends on TV).  Poor I/S effort.  I asked RT to re-instruct on proper use.  Hgb 7.8.  Ortho following.  Required significant assistance from PT.     2. COPD w/ acute-on-chronic respiratory failure:  Poor I/S effort as noted in #1.  CXR reviewed and suspect atelectasis.  Asked nursing to be cautious w/ her narcotics.     3. GERD: Continue PPI therapy.     4. Anxiety/depression with h/o psychosis: Nothing acute.  Continue home regimen, but I'm going to stop her Ativan.  Multiple sedative medications.     5. Peripheral neuropathy: Leg better today.  On Neurontin.     6. Lumbosacral disc disease:  Nothing acute.       7. Tobacco abuse: On Nicoderm.     8. Leukocytosis: trending down.  UA?.      9. Chronic Anemia: Hgb 7.8.  Minimize blood draws.  No s/s of active bleeding per staff.    Plan for disposition: Back to NH at discharge    Salvador Del Rio MD  04/13/17  3:20 PM

## 2017-04-13 NOTE — PLAN OF CARE
Problem: Inpatient Physical Therapy  Goal: Bed Mobility Goal STG- PT    04/13/17 1034   Bed Mobility PT STG   Bed Mobility PT STG, Date Established 04/13/17   Bed Mobility PT STG, Time to Achieve 3 days   Bed Mobility PT STG, Activity Type supine to sit/sit to supine   Bed Mobility PT STG, [place] maximum assist (25% patient effort)       Goal: Transfer Training Goal 1 STG- PT    04/13/17 1034   Transfer Training PT STG   Transfer Training PT STG, Date Established 04/13/17   Transfer Training PT STG, Time to Achieve 3 days   Transfer Training PT STG, Activity Type sit to stand/stand to sit   Transfer Training PT STG, [place] maximum assist (25% patient effort)       Goal: Transfer Training Goal 2 STG- PT    04/13/17 1034   Transfer Training 2 PT STG   Transfer Training PT 2 STG, Date Established 04/13/17   Transfer Training PT 2 STG, Time to Achieve 3 days   Transfer Training PT 2 STG, Activity Type bed to chair /chair to bed   Transfer Training PT 2 STG, [place] maximum assist (25% patient effort);2 person assist required   Transfer Training PT 2 STG, Assist Device (appropriate assistive device)

## 2017-04-13 NOTE — PLAN OF CARE
Problem: Patient Care Overview (Adult)  Goal: Plan of Care Review  Outcome: Ongoing (interventions implemented as appropriate)    04/13/17 0541   Coping/Psychosocial Response Interventions   Plan Of Care Reviewed With patient   Patient Care Overview   Progress progress toward functional goals as expected

## 2017-04-13 NOTE — PLAN OF CARE
Problem: Patient Care Overview (Adult)  Goal: Plan of Care Review    04/13/17 1112   Coping/Psychosocial Response Interventions   Plan Of Care Reviewed With patient   Patient Care Overview   Progress progress toward functional goals is gradual       Goal: Discharge Needs Assessment  Outcome: Ongoing (interventions implemented as appropriate)    Problem: Perioperative Period (Adult)  Goal: Signs and Symptoms of Listed Potential Problems Will be Absent or Manageable (Perioperative Period)  Outcome: Ongoing (interventions implemented as appropriate)

## 2017-04-13 NOTE — PROGRESS NOTES
Orthopedic Progress Note   Chief Complaint: Status post revision left total hip with revision of acetabular component    Subjective     Interval History: Patient postop day 2 doing well pain is subsiding somewhat.  She is afebrile.  Hemoglobin is now 7.8 and she is stable.  Patient reports today that she's had no active dorsiflexion of her foot since a foot fracture in February 2015 when she has been ambulatory.          Objective     Vital Signs  Temp:  [98.5 °F (36.9 °C)-100.6 °F (38.1 °C)] 99.1 °F (37.3 °C)  Heart Rate:  [] 101  Resp:  [20] 20  BP: (113-126)/(70-86) 126/84  Body mass index is 28.19 kg/(m^2).    Intake/Output Summary (Last 24 hours) at 04/13/17 0702  Last data filed at 04/12/17 1504   Gross per 24 hour   Intake              240 ml   Output              500 ml   Net             -260 ml             Physical Exam:   General: patient awake, alert and cooperative   Cardiovascular: regular rhythm and rate   Pulm: clear to auscultation bilaterally   Abdomen: Benign.  Soft bowel sounds   Extremities:She does have active dorsiflexion of the right foot with left foot there is no active dorsiflexion but again the patient states she has had no use of that foot since the fracture over 2 years ago.  The wound remains benign.   Neurologic: Normal mood and behavior     Results Review:     I reviewed the patient's new clinical results.      WBC No results found for: WBCS   HGB Hemoglobin   Date Value Ref Range Status   04/13/2017 7.8 (L) 12.0 - 16.0 g/dL Final   04/12/2017 9.0 (L) 12.0 - 16.0 g/dL Final   04/11/2017 11.5 (L) 12.0 - 16.0 g/dL Final      HCT Hematocrit   Date Value Ref Range Status   04/13/2017 26.4 (L) 37.0 - 47.0 % Final   04/12/2017 30.5 (L) 37.0 - 47.0 % Final   04/11/2017 38.6 37.0 - 47.0 % Final      Platlets No results found for: LABPLAT     PT/INR:  No results found for: PROTIME/No results found for: INR    Sodium Sodium   Date Value Ref Range Status   04/13/2017 134 (L) 136 - 145  mmol/L Final   04/12/2017 134 (L) 136 - 145 mmol/L Final      Potassium Potassium   Date Value Ref Range Status   04/13/2017 4.7 3.5 - 5.2 mmol/L Final   04/12/2017 5.0 3.5 - 5.2 mmol/L Final   04/11/2017 4.3 3.5 - 5.2 mmol/L Final      Chloride Chloride   Date Value Ref Range Status   04/13/2017 98 98 - 107 mmol/L Final   04/12/2017 98 98 - 107 mmol/L Final      Bicarbonate No results found for: PLASMABICARB   BUN BUN   Date Value Ref Range Status   04/13/2017 10 6 - 20 mg/dL Final   04/12/2017 16 6 - 20 mg/dL Final      Creatinine Creatinine   Date Value Ref Range Status   04/13/2017 0.77 0.57 - 1.00 mg/dL Final   04/12/2017 0.99 0.57 - 1.00 mg/dL Final      Calcium Calcium   Date Value Ref Range Status   04/13/2017 8.3 (L) 8.6 - 10.5 mg/dL Final   04/12/2017 8.9 8.6 - 10.5 mg/dL Final      Magnesium  AST  ALT  Bilirubin, Total  AlkPhos  Albumin    Amylase  Lipase    Radiology: No results found for: MG  No components found for: AST.*  No components found for: ALT.*  No components found for: BILIRUBIN, TOTAL.*    No components found for: ALKPHOS.*  No components found for: ALBUMIN.*      No components found for: AMYLASE.*  No components found for: LIPASE.*            Imaging Results (most recent)     Procedure Component Value Units Date/Time    XR Hip With or Without Pelvis 2 - 3 View Left [[id number]] Collected:  04/11/17 0901     Updated:  04/11/17 0905    Narrative:       LEFT HIP, 04/11/2017:     HISTORY:  56-year-old female status post left hip arthroplasty after hip fracture  December 2015. Preoperative evaluation prior to reported revision hip  surgery.     TECHNIQUE:  Two view left hip series.     FINDINGS:  The examination shows significant chronic protrusion of the acetabular  cup component into the hip with some surrounding ossification. The  femoral component is well seated, and there is no hip dislocation or  subluxation. No visible fracture involving the remainder of the  visualized left hemipelvis.        Impression:       1. 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  2. No acute osseous abnormality.     This report was finalized on 4/11/2017 9:03 AM by Dr. Jerome Espino MD.       XR Hip With or Without Pelvis 1 View Left [[id number]] Collected:  04/11/17 1418     Updated:  04/11/17 1421    Narrative:       POSTOP LEFT HIP, 04/11/2017:     HISTORY:  Postop revision left hip arthroplasty.     TECHNIQUE:  Single AP radiograph of the left hip was obtained portably following  surgery.     FINDINGS:  Postop changes revision left hip arthroplasty. No visible fracture or  dislocation.     This report was finalized on 4/11/2017 2:18 PM by Dr. Jerome Espino MD.       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

## 2017-04-13 NOTE — PLAN OF CARE
Problem: Patient Care Overview (Adult)  Goal: Plan of Care Review    04/13/17 9758   Outcome Evaluation   Outcome Summary/Follow up Plan PT: Patient recalls 2/3 hip precautions. Patient requires max A x 2 with significant cues for transfers. She requires max A for static standing. Patient unable to DF L ankle or flex/extend toes. Nursing notified.

## 2017-04-13 NOTE — SIGNIFICANT NOTE
04/13/17 0854   Rehab Treatment   Discipline occupational therapist   Rehab Evaluation   Evaluation Not Performed patient/family declined evaluation.  Pt stated she was eating breakfast and may participate later in day.

## 2017-04-13 NOTE — PROGRESS NOTES
Acute Care - Physical Therapy Treatment Note   Marlene De La Garza     Patient Name: Mariangel Thomas  : 1960  MRN: [id number]  Today's Date: 2017  Onset of Illness/Injury or Date of Surgery Date: 17  Date of Referral to PT: 17  Referring Physician: Dr. Chavez    Admit Date: 2017    Visit Dx:    ICD-10-CM ICD-9-CM   1. 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 718.65     Patient Active Problem List   Diagnosis   • Failed total hip arthroplasty   • Acetabular protrusion               Adult Rehabilitation Note       17 1415          Rehab Assessment/Intervention    Discipline physical therapist  -BP      Document Type therapy note (daily note)  -BP      Subjective Information agree to therapy;complains of;pain  -BP      Patient Effort, Rehab Treatment fair  -BP      Symptoms Noted During/After Treatment increased pain  -BP      Precautions/Limitations hip precautions- left   states 2/3 hip precautions  -BP      Specific Treatment Considerations Patient requires maximum encouragement for minimal effort with therapy. Patient requires max cues to adhere to hip preacuations during session.   -BP      Recorded by [BP] Rae Mckinney, PT      Pain Assessment    Pain Assessment 0-10  -BP      Pain Score --   States she is in a lot of pain, does not rate   -BP      Pain Type Acute pain;Surgical pain  -BP      Pain Location Hip  -BP      Pain Orientation Left  -BP      Pain Intervention(s) Repositioned;Medication (See MAR)   pre medicated for treatment   -BP      Response to Interventions tolerated  -BP      Recorded by [BP] Rae Mckinney, PT      Cognitive Assessment/Intervention    Current Cognitive/Communication Assessment impaired  -BP      Personal Safety Interventions gait belt;nonskid shoes/slippers when out of bed  -BP      Recorded by [BP] Rae Mckinney, PT      Bed Mobility, Assessment/Treatment    Bed Mobility, Assistive Device bed rails;head of bed elevated;draw sheet  -BP      Bed Mob, Supine to  Sit, [place] maximum assist (25% patient effort);2 person assist required;verbal cues required  -BP      Bed Mob, Sit to Supine, [place] dependent (less than 25% patient effort);verbal cues required   x 3 person assist   -BP      Bed Mobility, Comment Verbal cues for sequencing. Verbal cues to adhere to hip precautions.   -BP      Recorded by [BP] Rae Mckinney, PT      Transfer Assessment/Treatment    Transfers, Sit-Stand [place] maximum assist (25% patient effort);2 person assist required;verbal cues required;dependent (less than 25% patient effort)  -BP      Transfers, Stand-Sit [place] maximum assist (25% patient effort);2 person assist required;verbal cues required  -BP      Transfers, Sit-Stand-Sit, Assist Device standard walker  -BP      Transfer, Comment Verbal and tactile cues requried for hand placement. Patient dependent for the first 50% of transfer, max A x 2 for the second 50%. Patient does not place weight through L LE stating it is numb despite being intact to light touch. Patient does not have active ankle DF in L LE and placed weight through lateral side of foot. Not appropriate to attempt bed to chair transfers. Patient requires max A to maintain static standing. Stood x 2.   -BP      Recorded by [BP] Rae Mckinney, PT      Gait Assessment/Treatment    Gait, Comment Not ambulatory at baseline. Not appropriate to perform gait training.   -BP      Recorded by [BP] Rae Mckinney PT      Positioning and Restraints    Pre-Treatment Position in bed  -BP      Post Treatment Position bed  -BP      In Bed supine;call light within reach;encouraged to call for assist;with nsg  -BP      Recorded by [BP] Rae Mckinney PT        User Key  (r) = Recorded By, (t) = Taken By, (c) = Cosigned By    Initials Name Effective Dates    BP Rae Mckinney, PT 12/01/15 -                 IP PT Goals       04/13/17 1034          Bed Mobility PT STG    Bed Mobility PT STG, Date Established  04/13/17  -BP      Bed Mobility PT STG, Time to Achieve 3 days  -BP      Bed Mobility PT STG, Activity Type supine to sit/sit to supine  -BP      Bed Mobility PT STG, [place] Level maximum assist (25% patient effort)  -BP      Transfer Training PT STG    Transfer Training PT STG, Date Established 04/13/17  -BP      Transfer Training PT STG, Time to Achieve 3 days  -BP      Transfer Training PT STG, Activity Type sit to stand/stand to sit  -BP      Transfer Training PT STG, [place] Level maximum assist (25% patient effort)  -BP      Transfer Training 2 PT STG    Transfer Training PT 2 STG, Date Established 04/13/17  -BP      Transfer Training PT 2 STG, Time to Achieve 3 days  -BP      Transfer Training PT 2 STG, Activity Type bed to chair /chair to bed  -BP      Transfer Training PT 2 STG, [place] Level maximum assist (25% patient effort);2 person assist required  -BP      Transfer Training PT 2 STG, Assist Device --   appropriate assistive device  -BP        User Key  (r) = Recorded By, (t) = Taken By, (c) = Cosigned By    Initials Name Provider Type    BP Rae Mckinney, PT Physical Therapist          Physical Therapy Education     Title: PT OT SLP Therapies (Active)     Topic: Physical Therapy (Active)     Point: Mobility training (Done)    Learning Progress Summary    Learner Readiness Method Response Comment Documented by Status   Patient Acceptance E VU  BP 04/13/17 1449 Done    Acceptance E NR  BP 04/13/17 1030 Active               Point: Precautions (Done)    Learning Progress Summary    Learner Readiness Method Response Comment Documented by Status   Patient Acceptance E VU  BP 04/13/17 1449 Done    Acceptance E NR  BP 04/13/17 1030 Active                      User Key     Initials Effective Dates Name Provider Type Discipline    BP 12/01/15 -  Rae Mckinney PT Physical Therapist PT                    PT Recommendation and Plan  Anticipated Equipment Needs At Discharge:   (none)  Anticipated Discharge Disposition: extended care facility  Planned Therapy Interventions: bed mobility training, strengthening, patient/family education, transfer training  PT Frequency: 2 times/day  Plan of Care Review  Plan Of Care Reviewed With: patient  Outcome Summary/Follow up Plan: PT: Patient recalls 2/3 hip precautions. Patient requires max A x 2 with significant cues for transfers. She requires max A for static standing. Patient unable to DF L ankle or flex/extend toes. Nursing notified.           Outcome Measures       04/13/17 1415 04/13/17 1200 04/13/17 0957    How much help from another person do you currently need...    Turning from your back to your side while in flat bed without using bedrails? 1  -BP      Moving from lying on back to sitting on the side of a flat bed without bedrails? 1  -BP      Moving to and from a bed to a chair (including a wheelchair)? 1  -BP      Standing up from a chair using your arms (e.g., wheelchair, bedside chair)? 1  -BP      Climbing 3-5 steps with a railing? 1  -BP      To walk in hospital room? 1  -BP      AM-PAC 6 Clicks Score 6  -BP      How much help from another is currently needed...    Putting on and taking off regular lower body clothing?   1  -JJ (r) MH (t) JJ (c)    Bathing (including washing, rinsing, and drying)   1  -JJ (r) MH (t) JJ (c)    Toileting (which includes using toilet bed pan or urinal)   1  -JJ (r) MH (t) JJ (c)    Putting on and taking off regular upper body clothing   1  -JJ (r) MH (t) JJ (c)    Taking care of personal grooming (such as brushing teeth)   1  -JJ (r) MH (t) JJ (c)    Eating meals   4  -JJ (r) MH (t) JJ (c)    Score   9  -JJ (r) MH (t)    Functional Assessment    Outcome Measure Options AM-PAC 6 Clicks Basic Mobility (PT)  -BP --  -JJ (r) MH (t) JJ (c) AM-PAC 6 Clicks Daily Activity (OT)  -JJ (r) MH (t) JJ (c)      04/13/17 0936          How much help from another person do you currently need...    Turning from your  back to your side while in flat bed without using bedrails? 1  -BP      Moving from lying on back to sitting on the side of a flat bed without bedrails? 1  -BP      Moving to and from a bed to a chair (including a wheelchair)? 1  -BP      Standing up from a chair using your arms (e.g., wheelchair, bedside chair)? 1  -BP      Climbing 3-5 steps with a railing? 1  -BP      To walk in hospital room? 1  -BP      AM-PAC 6 Clicks Score 6  -BP      Functional Assessment    Outcome Measure Options AM-PAC 6 Clicks Basic Mobility (PT)  -BP        User Key  (r) = Recorded By, (t) = Taken By, (c) = Cosigned By    Initials Name Provider Type    MUKESH Handy, OTR Occupational Therapist    BP Rae Mckinney, PT Physical Therapist    ATUL Gupta, OT Student OT Student           Time Calculation:         PT Charges       04/13/17 1456 04/13/17 1035       Time Calculation    Start Time 1415  -BP 0936  -BP     Stop Time 1430  -BP      Time Calculation (min) 15 min  -BP      PT Received On 04/13/17  -BP 04/13/17  -BP     PT - Next Appointment 04/14/17  -BP 04/13/17  -BP       User Key  (r) = Recorded By, (t) = Taken By, (c) = Cosigned By    Initials Name Provider Type    BP Rae Mckinney, PT Physical Therapist          Therapy Charges for Today     Code Description Service Date Service Provider Modifiers Qty    83725511913 HC PT EVAL MOD COMPLEXITY 2 4/13/2017 Rae Mckinney, PT GP 1    49908338077 HC PT THER PROC EA 15 MIN 4/13/2017 Rae Mckinney, PT GP 1    06069387116 HC PT THER SUPP EA 15 MIN 4/13/2017 Rae Mckinney, PT GP 1          PT G-Codes  Outcome Measure Options: AM-PAC 6 Clicks Basic Mobility (PT)    Rae Mckinney PT  4/13/2017

## 2017-04-13 NOTE — PLAN OF CARE
Problem: Patient Care Overview (Adult)  Goal: Plan of Care Review    04/13/17 1030   Coping/Psychosocial Response Interventions   Plan Of Care Reviewed With patient   Outcome Evaluation   Outcome Summary/Follow up Plan PT Evaluation Complete: Patient completes supine to sit transfer with Max A x 3, sit to supine depenent x 3, and sit to/from stand transfers with max A x 2. Patient unable to any hip precautions following education. Patient with no active L ankle DF, sensation in tact. Patient would benefit from skilled PT services to address functional mobility and LE strength deficits. Plan to see patient 2x/day. Patient would benefit from short term rehab with transition to LTC.

## 2017-04-13 NOTE — PLAN OF CARE
Problem: Patient Care Overview (Adult)  Goal: Plan of Care Review    04/13/17 1224   Outcome Evaluation   Outcome Summary/Follow up Plan OT evaluation complete: Patient requires Max A x 3 for supine to sit transfer and sit to supine dependent x3. Pt able to perform sit-stand transfer with Max A x 2. Patient requires Max A x 2 for static standing. Patient dependent for brief management and hygiene. Patient states that she is dependent with all ADL's. Patient unable to recall/adhere to hip precautions during functional activities. Recommend OT services to address education with hip precautions and static standing balance to increase caregiver assist with ADL's.          Problem: Inpatient Occupational Therapy  Goal: Static Standing Balance Goal OT- STG    04/13/17 1224   Static Standing Balance OT STG   Static Standing Balance OT STG, Date Established 04/13/17   Static Standing Balance OT STG, Time to Achieve 3 days   Static Standing Balance OT STG, [place] Level moderate assist (50% patient effort);2 person assist required   Static Standing Balance OT STG, Assist Device (to allow for caegiver assist during ADL activities)       Goal: Patient Education Goal STG- OT    04/13/17 1224   Patient Education OT STG   Patient Education OT STG, Date Established 04/13/17   Patient Education OT STG, Time to Achieve 2 - 3 days   Patient Education OT STG, Education Type precautions per surgeon;posture/body mechanics  (hip precautions to prevent dislocation.)   Patient Education OT STG, Education Understanding verbalizes understanding;demonstrates adequately

## 2017-04-14 VITALS
HEIGHT: 67 IN | TEMPERATURE: 99.9 F | RESPIRATION RATE: 20 BRPM | HEART RATE: 92 BPM | BODY MASS INDEX: 28.25 KG/M2 | DIASTOLIC BLOOD PRESSURE: 73 MMHG | SYSTOLIC BLOOD PRESSURE: 114 MMHG | WEIGHT: 180 LBS | OXYGEN SATURATION: 96 %

## 2017-04-14 LAB
ABO GROUP BLD: NORMAL
ANISOCYTOSIS BLD QL: ABNORMAL
BACTERIA SPEC AEROBE CULT: NORMAL
BLD GP AB SCN SERPL QL: NEGATIVE
DEPRECATED RDW RBC AUTO: 51.7 FL (ref 37–54)
ELLIPTOCYTES BLD QL SMEAR: ABNORMAL
EOSINOPHIL # BLD MANUAL: 0.31 10*3/MM3 (ref 0.1–0.3)
EOSINOPHIL NFR BLD MANUAL: 3 % (ref 0–4)
ERYTHROCYTE [DISTWIDTH] IN BLOOD BY AUTOMATED COUNT: 16.9 % (ref 11.5–14.5)
GRAM STN SPEC: NORMAL
HCT VFR BLD AUTO: 24.7 % (ref 37–47)
HGB BLD-MCNC: 7.2 G/DL (ref 12–16)
HYPOCHROMIA BLD QL: ABNORMAL
LYMPHOCYTES # BLD MANUAL: 2.41 10*3/MM3 (ref 0.6–4.8)
LYMPHOCYTES NFR BLD MANUAL: 23 % (ref 20–45)
LYMPHOCYTES NFR BLD MANUAL: 6 % (ref 3–8)
MACROCYTES BLD QL SMEAR: ABNORMAL
MCH RBC QN AUTO: 24.6 PG (ref 27–31)
MCHC RBC AUTO-ENTMCNC: 29.1 G/DL (ref 31–37)
MCV RBC AUTO: 84.3 FL (ref 81–99)
MONOCYTES # BLD AUTO: 0.63 10*3/MM3 (ref 0–1)
NEUTROPHILS # BLD AUTO: 7.13 10*3/MM3 (ref 1.5–8.3)
NEUTROPHILS NFR BLD MANUAL: 68 % (ref 45–70)
PLATELET # BLD AUTO: 142 10*3/MM3 (ref 140–500)
PMV BLD AUTO: 11.8 FL (ref 7.4–10.4)
POIKILOCYTOSIS BLD QL SMEAR: ABNORMAL
RBC # BLD AUTO: 2.93 10*6/MM3 (ref 4.2–5.4)
RH BLD: POSITIVE
SMALL PLATELETS BLD QL SMEAR: ADEQUATE
TARGETS BLD QL SMEAR: ABNORMAL
WBC MORPH BLD: NORMAL
WBC NRBC COR # BLD: 10.49 10*3/MM3 (ref 4.8–10.8)

## 2017-04-14 PROCEDURE — 86900 BLOOD TYPING SEROLOGIC ABO: CPT | Performed by: NURSE PRACTITIONER

## 2017-04-14 PROCEDURE — 86920 COMPATIBILITY TEST SPIN: CPT

## 2017-04-14 PROCEDURE — P9016 RBC LEUKOCYTES REDUCED: HCPCS

## 2017-04-14 PROCEDURE — 97535 SELF CARE MNGMENT TRAINING: CPT

## 2017-04-14 PROCEDURE — 86900 BLOOD TYPING SEROLOGIC ABO: CPT

## 2017-04-14 PROCEDURE — 85007 BL SMEAR W/DIFF WBC COUNT: CPT | Performed by: ORTHOPAEDIC SURGERY

## 2017-04-14 PROCEDURE — 86901 BLOOD TYPING SEROLOGIC RH(D): CPT | Performed by: NURSE PRACTITIONER

## 2017-04-14 PROCEDURE — 94799 UNLISTED PULMONARY SVC/PX: CPT

## 2017-04-14 PROCEDURE — 86850 RBC ANTIBODY SCREEN: CPT | Performed by: NURSE PRACTITIONER

## 2017-04-14 PROCEDURE — 85025 COMPLETE CBC W/AUTO DIFF WBC: CPT | Performed by: ORTHOPAEDIC SURGERY

## 2017-04-14 PROCEDURE — 99024 POSTOP FOLLOW-UP VISIT: CPT | Performed by: ORTHOPAEDIC SURGERY

## 2017-04-14 PROCEDURE — 36430 TRANSFUSION BLD/BLD COMPNT: CPT

## 2017-04-14 RX ORDER — SODIUM CHLORIDE 9 MG/ML
INJECTION, SOLUTION INTRAVENOUS
Status: COMPLETED
Start: 2017-04-14 | End: 2017-04-14

## 2017-04-14 RX ORDER — SENNA AND DOCUSATE SODIUM 50; 8.6 MG/1; MG/1
2 TABLET, FILM COATED ORAL 2 TIMES DAILY
Status: ON HOLD
Start: 2017-04-14 | End: 2019-10-13

## 2017-04-14 RX ORDER — NICOTINE 21 MG/24HR
1 PATCH, TRANSDERMAL 24 HOURS TRANSDERMAL EVERY 24 HOURS
Status: ON HOLD
Start: 2017-04-14 | End: 2019-10-13

## 2017-04-14 RX ADMIN — OXYCODONE HYDROCHLORIDE 10 MG: 5 TABLET ORAL at 13:06

## 2017-04-14 RX ADMIN — POTASSIUM CHLORIDE 20 MEQ: 20 TABLET, EXTENDED RELEASE ORAL at 08:53

## 2017-04-14 RX ADMIN — ACETAMINOPHEN 1000 MG: 500 TABLET ORAL at 08:52

## 2017-04-14 RX ADMIN — ACETAMINOPHEN 1000 MG: 500 TABLET ORAL at 03:02

## 2017-04-14 RX ADMIN — CETIRIZINE HYDROCHLORIDE 5 MG: 10 TABLET, FILM COATED ORAL at 08:53

## 2017-04-14 RX ADMIN — PANTOPRAZOLE SODIUM 40 MG: 40 TABLET, DELAYED RELEASE ORAL at 05:58

## 2017-04-14 RX ADMIN — SODIUM CHLORIDE: 0.9 INJECTION, SOLUTION INTRAVENOUS at 11:15

## 2017-04-14 RX ADMIN — NICOTINE 1 PATCH: 14 PATCH, EXTENDED RELEASE TRANSDERMAL at 15:01

## 2017-04-14 RX ADMIN — OXYCODONE HYDROCHLORIDE 10 MG: 5 TABLET ORAL at 08:59

## 2017-04-14 RX ADMIN — IPRATROPIUM BROMIDE AND ALBUTEROL SULFATE 3 ML: .5; 3 SOLUTION RESPIRATORY (INHALATION) at 02:50

## 2017-04-14 RX ADMIN — BUSPIRONE HYDROCHLORIDE 5 MG: 5 TABLET ORAL at 08:52

## 2017-04-14 RX ADMIN — IPRATROPIUM BROMIDE AND ALBUTEROL SULFATE 3 ML: .5; 3 SOLUTION RESPIRATORY (INHALATION) at 15:20

## 2017-04-14 RX ADMIN — IPRATROPIUM BROMIDE AND ALBUTEROL SULFATE 3 ML: .5; 3 SOLUTION RESPIRATORY (INHALATION) at 11:14

## 2017-04-14 RX ADMIN — APIXABAN 2.5 MG: 2.5 TABLET, FILM COATED ORAL at 08:52

## 2017-04-14 RX ADMIN — GABAPENTIN 200 MG: 100 CAPSULE ORAL at 08:52

## 2017-04-14 RX ADMIN — DOCUSATE SODIUM AND SENNOSIDES 2 TABLET: 8.6; 5 TABLET, FILM COATED ORAL at 08:53

## 2017-04-14 RX ADMIN — OXYCODONE HYDROCHLORIDE 10 MG: 5 TABLET ORAL at 03:58

## 2017-04-14 RX ADMIN — IPRATROPIUM BROMIDE AND ALBUTEROL SULFATE 3 ML: .5; 3 SOLUTION RESPIRATORY (INHALATION) at 07:04

## 2017-04-14 RX ADMIN — FUROSEMIDE 20 MG: 20 TABLET ORAL at 08:53

## 2017-04-14 NOTE — PLAN OF CARE
Problem: Patient Care Overview (Adult)  Goal: Plan of Care Review  Outcome: Ongoing (interventions implemented as appropriate)    04/14/17 0331   Coping/Psychosocial Response Interventions   Plan Of Care Reviewed With patient   Patient Care Overview   Progress no change   Outcome Evaluation   Outcome Summary/Follow up Plan possible d/c in am         Problem: Hip Replacement, Total (Adult)  Goal: Signs and Symptoms of Listed Potential Problems Will be Absent or Manageable (Hip Replacement, Total)  Outcome: Ongoing (interventions implemented as appropriate)    04/14/17 0331   Hip Replacement, Total   Problems Assessed (Total Hip Replacement) all   Problems Present (Total Hip Replacement) pain

## 2017-04-14 NOTE — NURSING NOTE
Spoke with Rupal @ [facility] [phone] to inform probable dc this afternoon. Will Fax d/c summary. Will continue to follow thru dc.

## 2017-04-14 NOTE — SIGNIFICANT NOTE
04/14/17 0937   Rehab Treatment   Discipline physical therapist   Treatment Not Performed unable to treat, medical status change  (Hbg 7.2. Pt to receive 1 unit of blood. Patient to be discharged following transfusion. Will check back post transfusion. )

## 2017-04-14 NOTE — PLAN OF CARE
Problem: Patient Care Overview (Adult)  Goal: Plan of Care Review  Outcome: Ongoing (interventions implemented as appropriate)    04/14/17 0252   Coping/Psychosocial Response Interventions   Plan Of Care Reviewed With patient   Patient Care Overview   Progress improving       Goal: Adult Individualization and Mutuality  Outcome: Ongoing (interventions implemented as appropriate)    Problem: Perioperative Period (Adult)  Intervention: Promote Pulmonary Hygiene and Secretion Clearance    04/14/17 0252   Promote Aggressive Pulmonary Hygiene/Secretion Management   Cough And Deep Breathing done independently per patient   Positioning   Head Of Bed (HOB) Position HOB elevated   Activity   Activity Type activity adjusted per tolerance   Incentive Spirometer   Administration (Incentive Spirometer) done independently per patient;other (see comments)  (using aerokiba as well)

## 2017-04-14 NOTE — DISCHARGE SUMMARY
Mariangel Thomas  1960  [id number]        Hospitalists Discharge Summary    Date of Admission: 4/11/2017  Date of Discharge:  4/14/2017    Primary Discharge Diagnoses:    1.  Left Acetabular Protrusion s/p JIM revision  2.  Acute on Chronic Anemia   3.  Leukocytosis  4.  Atelectasis    Secondary Discharge Diagnoses:    1.  COPD  2.  Chronic Resp Failure  3.  GERD  4.  Anxiety  5.  Depression  6.  Chronic Left foot drop  7.  DDD of the Lumbar Spine  8.  Tobacco abuse  9.  Vitamin D Deficiency    PCP  Patient Care Team:  Antoine Talavera Jr., MD as PCP - General  Antoine Talavera Jr., MD as PCP - Family Medicine    Consults:   Dr. Piter Chavez of Orthopedics    Operations and Procedures Performed:  Procedure(s):  TOTAL HIP ARTHROPLASTY REVISION, depuy & baljeet, cultures taken, 7fr hemovac placement     Xr Chest 1 View    Result Date: 4/13/2017  Narrative: INDICATION: Shortness of air status post hip surgery. Postop day #1.  COMPARISON:  12/10/2015  FINDINGS: Single portable AP view of the chest.  Heart and mediastinal contours are unchanged. There is a dense airspace opacity in the right midlung, increased from the prior study. Linear airspace opacities in the left midlung are fairly similar to the prior study.. No pneumothorax or pleural effusion.      Impression: Increased airspace opacity in the right mid lung may represent atelectasis or developing infiltrate.  This report was finalized on 4/13/2017 7:55 AM by Dr. Cliff Campos MD.      Xr Hip With Or Without Pelvis 1 View Left    Result Date: 4/11/2017  Narrative: POSTOP LEFT HIP, 04/11/2017:  HISTORY: Postop revision left hip arthroplasty.  TECHNIQUE: Single AP radiograph of the left hip was obtained portably following surgery.  FINDINGS: Postop changes revision left hip arthroplasty. No visible fracture or dislocation.  This report was finalized on 4/11/2017 2:18 PM by Dr. Jerome Espino MD.      Xr Hip With Or Without Pelvis 2 - 3 View Left    Result Date:  4/11/2017  Narrative: LEFT HIP, 04/11/2017:  HISTORY: 56-year-old female status post left hip arthroplasty after hip fracture December 2015. Preoperative evaluation prior to reported revision hip surgery.  TECHNIQUE: Two view left hip series.  FINDINGS: The examination shows significant chronic protrusion of the acetabular cup component into the hip with some surrounding ossification. The femoral component is well seated, and there is no hip dislocation or subluxation. No visible fracture involving the remainder of the visualized left hemipelvis.      Impression: 1. Marked chronic acetabular protrusion involving left total hip arthroplasty. 2. No acute osseous abnormality.  This report was finalized on 4/11/2017 9:03 AM by Dr. Jerome Espino MD.        Allergies:  is allergic to aleve [naproxen sodium]; amoxicillin; bactrim [sulfamethoxazole-trimethoprim]; keflex [cephalexin]; lamisil [terbinafine hcl]; levaquin [levofloxacin in d5w]; and penicillins.      Discharge Medications:   Mariangel Thomas   Home Medication Instructions TEJ:[id number]    Printed on:04/14/17 5199   Medication Information                      apixaban (ELIQUIS) 2.5 MG tablet tablet  Take 2.5 mg by mouth Every 12 (Twelve) Hours.             busPIRone (BUSPAR) 5 MG tablet  Take 5 mg by mouth 2 (Two) Times a Day.             citalopram (CeleXA) 20 MG tablet  Take 20 mg by mouth Every Night.             furosemide (LASIX) 20 MG tablet  Take 20 mg by mouth Daily.             gabapentin (NEURONTIN) 100 MG capsule  Take 200 mg by mouth 2 (Two) Times a Day.             Loratadine 10 MG capsule  Take 10 mg by mouth Daily.             magnesium hydroxide (MILK OF MAGNESIA) 400 MG/5ML suspension  Take 30 mL by mouth Every 6 (Six) Hours As Needed for constipation.             melatonin 5 MG tablet tablet  Take 3 mg by mouth Every Night.             mirtazapine (REMERON) 7.5 MG tablet  Take 7.5 mg by mouth Every Night.             nicotine (NICODERM CQ)  "14 MG/24HR patch  Place 1 patch on the skin Daily.             omeprazole (priLOSEC) 20 MG capsule  Take 20 mg by mouth Daily.             oxyCODONE-acetaminophen (PERCOCET) 5-325 MG per tablet  Take 1 tablet by mouth Every 4 (Four) Hours As Needed for moderate pain (4-6).             potassium chloride (K-DUR,KLOR-CON) 20 MEQ CR tablet  Take 20 mEq by mouth 2 (Two) Times a Day.             sennosides-docusate sodium (SENOKOT-S) 8.6-50 MG tablet  Take 2 tablets by mouth 2 (Two) Times a Day.             Umeclidinium-Vilanterol 62.5-25 MCG/INH aerosol powder   Inhale 1 puff Every Morning.             vitamin D (ERGOCALCIFEROL) 75934 UNITS capsule capsule  Take 50,000 Units by mouth Every 14 (Fourteen) Days.             Metformin 500 MG  Take One Tablet PO BIDAC      History of Present Illness (taken from H&P):    Ms. Mariangel Thomas is a 56 year old  female who is known to have COPD, GERD, Urinary incontinence, DJD, Psychosis and insomnia who had Lt Total Hip arthroplasty done in December 2015 but has been having increasing pain and X-Rays showed protrusion of acetabulum into the pelvis. Dr Chavez did Lt total hip arthroplasty revision today. The Hospitalist service has been contacted to take care of patient's medical problems. Patient has some pain in hip at this time but denies any sx of headache, shortness of breath, chest pain, abdominal pain, dysuria or recent hx of blood in stool. She has some nausea but no vomiting. Patient is resting comfortably in bed.    Hospital Course    Ms. Thomas underwent successful revision of a LTHA, and continued on routine post-operative care on the Med/Surg floor.  Her pain was adequately controlled, and she required no escalation in supplemental O2.  She did have a post-operative leukocytosis.  Work-up did not reveal a source of infection, and CXR demonstrated atelectasis.  She had poor effort regarding I/S usage.  I also \"trimmed\" some of her sedating medications as she was " on several.  She was followed by PT who noted she required significant assistance with mobility.  We did give her 1 unit of PRBC's prior to discharge although her Hgb only dropped to 7.2 from a baseline of about 9.  She also had a blood glucose measurement of 216.  I did check an A1c which was 6.4%, on the cusp of a diabetes diagnosis.  I started her on Metformin.      Last Lab Results:   Lab Results (most recent)     Procedure Component Value Units Date/Time    Potassium [[id number]]  (Normal) Collected:  04/11/17 0814    Specimen:  Blood Updated:  04/11/17 0846     Potassium 4.3 mmol/L     CBC (No Diff) [[id number]]  (Abnormal) Collected:  04/11/17 1015    Specimen:  Blood Updated:  04/11/17 1022     WBC 7.58 10*3/mm3      RBC 4.71 10*6/mm3      Hemoglobin 11.5 (L) g/dL      Hematocrit 38.6 %      MCV 82.0 fL      MCH 24.4 (L) pg      MCHC 29.8 (L) g/dL      RDW 16.6 (H) %      RDW-SD 48.9 fl      MPV 12.1 (H) fL      Platelets 186 10*3/mm3     Anaerobic Culture [[id number]] Collected:  04/11/17 1133    Specimen:  Wound from Hip, Left Updated:  04/11/17 1147    CBC & Differential [[id number]] Collected:  04/12/17 0429    Specimen:  Blood Updated:  04/12/17 0442    Narrative:       The following orders were created for panel order CBC & Differential.  Procedure                               Abnormality         Status                     ---------                               -----------         ------                     CBC Auto Differential[[id number]]         Abnormal            Final result                 Please view results for these tests on the individual orders.    CBC Auto Differential [[id number]]  (Abnormal) Collected:  04/12/17 0429    Specimen:  Blood Updated:  04/12/17 0442     WBC 20.33 (H) 10*3/mm3      RBC 3.66 (L) 10*6/mm3      Hemoglobin 9.0 (L) g/dL      Hematocrit 30.5 (L) %      MCV 83.3 fL      MCH 24.6 (L) pg      MCHC 29.5 (L) g/dL      RDW 16.6 (H) %      RDW-SD 50.1 fl      MPV 11.5 (H) fL       Platelets 200 10*3/mm3      Neutrophil % 74.7 (H) %      Lymphocyte % 10.9 (L) %      Monocyte % 13.8 (H) %      Eosinophil % 0.0 %      Basophil % 0.2 %      Immature Grans % 0.4 %      Neutrophils, Absolute 15.18 (H) 10*3/mm3      Lymphocytes, Absolute 2.22 10*3/mm3      Monocytes, Absolute 2.80 (H) 10*3/mm3      Eosinophils, Absolute 0.00 (L) 10*3/mm3      Basophils, Absolute 0.05 10*3/mm3      Immature Grans, Absolute 0.08 (H) 10*3/mm3      nRBC 0.0 /100 WBC     aPTT [[id number]]  (Normal) Collected:  04/12/17 0429    Specimen:  Blood Updated:  04/12/17 0456     PTT 27.9 seconds     Narrative:       PTT = The equivalent PTT values for the therapeutic range of heparin levels at 0.1 to 0.7 U/ml are 53 to 110 seconds.    Urinalysis With / Culture If Indicated [[id number]]  (Abnormal) Collected:  04/12/17 1517    Specimen:  Urine from Urine, Catheter Updated:  04/12/17 1520     Color, UA Dark Yellow (A)     Appearance, UA Clear     pH, UA <=5.0     Specific Gravity, UA 1.025     Glucose, UA Negative     Ketones, UA Negative     Bilirubin, UA Negative     Blood, UA Negative     Protein, UA Negative     Leuk Esterase, UA Negative     Nitrite, UA Negative     Urobilinogen, UA 0.2 E.U./dL    Narrative:       Urine microscopic not indicated.    Comprehensive Metabolic Panel [[id number]]  (Abnormal) Collected:  04/12/17 1747    Specimen:  Blood Updated:  04/12/17 1818     Glucose 190 (H) mg/dL      BUN 16 mg/dL      Creatinine 0.99 mg/dL      Sodium 134 (L) mmol/L      Potassium 5.0 mmol/L      Chloride 98 mmol/L      CO2 29.1 (H) mmol/L      Calcium 8.9 mg/dL      Total Protein 6.4 g/dL      Albumin 2.80 (L) g/dL      ALT (SGPT) 15 U/L      AST (SGOT) 27 U/L      Alkaline Phosphatase 79 U/L      Total Bilirubin 0.5 mg/dL      eGFR Non African Amer 58 (L) mL/min/1.73      Globulin 3.6 gm/dL      A/G Ratio 0.8 g/dL      BUN/Creatinine Ratio 16.2     Anion Gap 6.9 mmol/L     CBC & Differential [[id number]] Collected:  04/13/17 0406     Specimen:  Blood Updated:  04/13/17 0457    Narrative:       The following orders were created for panel order CBC & Differential.  Procedure                               Abnormality         Status                     ---------                               -----------         ------                     CBC Auto Differential[[id number]]         Abnormal            Final result                 Please view results for these tests on the individual orders.    CBC Auto Differential [[id number]]  (Abnormal) Collected:  04/13/17 0408    Specimen:  Blood Updated:  04/13/17 0457     WBC 14.29 (H) 10*3/mm3      RBC 3.13 (L) 10*6/mm3      Hemoglobin 7.8 (L) g/dL      Hematocrit 26.4 (L) %      MCV 84.3 fL      MCH 24.9 (L) pg      MCHC 29.5 (L) g/dL      RDW 16.9 (H) %      RDW-SD 51.6 fl      MPV 12.6 (H) fL      Platelets 153 10*3/mm3      Neutrophil % 66.0 %      Lymphocyte % 16.6 (L) %      Monocyte % 14.8 (H) %      Eosinophil % 1.1 %      Basophil % 0.4 %      Immature Grans % 1.1 (H) %      Neutrophils, Absolute 9.42 (H) 10*3/mm3      Lymphocytes, Absolute 2.37 10*3/mm3      Monocytes, Absolute 2.12 (H) 10*3/mm3      Eosinophils, Absolute 0.16 10*3/mm3      Basophils, Absolute 0.06 10*3/mm3      Immature Grans, Absolute 0.16 (H) 10*3/mm3      nRBC 0.0 /100 WBC     Comprehensive Metabolic Panel [[id number]]  (Abnormal) Collected:  04/13/17 0408    Specimen:  Blood Updated:  04/13/17 0507     Glucose 216 (H) mg/dL      BUN 10 mg/dL      Creatinine 0.77 mg/dL      Sodium 134 (L) mmol/L      Potassium 4.7 mmol/L      Chloride 98 mmol/L      CO2 29.7 (H) mmol/L      Calcium 8.3 (L) mg/dL      Total Protein 6.3 g/dL      Albumin 2.60 (L) g/dL      ALT (SGPT) 13 U/L      AST (SGOT) 26 U/L      Alkaline Phosphatase 79 U/L      Total Bilirubin 0.4 mg/dL      eGFR Non African Amer 77 mL/min/1.73      Globulin 3.7 gm/dL      A/G Ratio 0.7 g/dL      BUN/Creatinine Ratio 13.0     Anion Gap 6.3 mmol/L     Hemoglobin A1c [[id number]]   (Abnormal) Collected:  04/13/17 0408    Specimen:  Blood Updated:  04/13/17 1650     Hemoglobin A1C 6.40 (H) %     Narrative:       Hemoglobin A1C Ranges:    Increased Risk for Diabetes  5.7% to 6.4%  Diabetes                     >= 6.5%  Diabetic Goal                < 7.0%    CBC & Differential [[id number]] Collected:  04/14/17 0349    Specimen:  Blood Updated:  04/14/17 0516    Narrative:       The following orders were created for panel order CBC & Differential.  Procedure                               Abnormality         Status                     ---------                               -----------         ------                     Manual Differential[[id number]]           Abnormal            Final result               CBC Auto Differential[[id number]]         Abnormal            Final result                 Please view results for these tests on the individual orders.    CBC Auto Differential [[id number]]  (Abnormal) Collected:  04/14/17 0349    Specimen:  Blood Updated:  04/14/17 0516     WBC 10.49 10*3/mm3      RBC 2.93 (L) 10*6/mm3      Hemoglobin 7.2 (C) g/dL      Hematocrit 24.7 (L) %      MCV 84.3 fL      MCH 24.6 (L) pg      MCHC 29.1 (L) g/dL      RDW 16.9 (H) %      RDW-SD 51.7 fl      MPV 11.8 (H) fL      Platelets 142 10*3/mm3     Manual Differential [[id number]]  (Abnormal) Collected:  04/14/17 0349    Specimen:  Blood Updated:  04/14/17 0516     Neutrophil % 68.0 %      Lymphocyte % 23.0 %      Monocyte % 6.0 %      Eosinophil % 3.0 %      Neutrophils Absolute 7.13 10*3/mm3      Lymphocytes Absolute 2.41 10*3/mm3      Monocytes Absolute 0.63 10*3/mm3      Eosinophils Absolute 0.31 (H) 10*3/mm3      Anisocytosis Slight/1+     Elliptocytes Slight/1+     Hypochromia Slight/1+     Macrocytes Slight/1+     Poikilocytes Slight/1+     Target Cells --      Occasional         WBC Morphology Normal     Platelet Estimate Adequate      Scattered large plts observed        Wound Culture [[id number]]  (Normal) Collected:   04/11/17 1133    Specimen:  Wound from Hip, Left Updated:  04/14/17 0840     Wound Culture No growth at 3 days     Gram Stain Result No WBCs or organisms seen        Imaging Results (most recent)     Procedure Component Value Units Date/Time    XR Hip With or Without Pelvis 2 - 3 View Left [[id number]] Collected:  04/11/17 0901     Updated:  04/11/17 0905    Narrative:       LEFT HIP, 04/11/2017:     HISTORY:  56-year-old female status post left hip arthroplasty after hip fracture  December 2015. Preoperative evaluation prior to reported revision hip  surgery.     TECHNIQUE:  Two view left hip series.     FINDINGS:  The examination shows significant chronic protrusion of the acetabular  cup component into the hip with some surrounding ossification. The  femoral component is well seated, and there is no hip dislocation or  subluxation. No visible fracture involving the remainder of the  visualized left hemipelvis.       Impression:       1. 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  2. No acute osseous abnormality.     This report was finalized on 4/11/2017 9:03 AM by Dr. Jerome Espino MD.       XR Hip With or Without Pelvis 1 View Left [[id number]] Collected:  04/11/17 1418     Updated:  04/11/17 1421    Narrative:       POSTOP LEFT HIP, 04/11/2017:     HISTORY:  Postop revision left hip arthroplasty.     TECHNIQUE:  Single AP radiograph of the left hip was obtained portably following  surgery.     FINDINGS:  Postop changes revision left hip arthroplasty. No visible fracture or  dislocation.     This report was finalized on 4/11/2017 2:18 PM by Dr. Jerome Espino MD.       XR Chest 1 View [[id number]] Collected:  04/13/17 0753     Updated:  04/13/17 0757    Narrative:       INDICATION: Shortness of air status post hip surgery. Postop day #1.      COMPARISON:  12/10/2015     FINDINGS:  Single portable AP view of the chest.     Heart and mediastinal contours are unchanged. There  is a dense airspace  opacity in the right midlung, increased from the prior study. Linear  airspace opacities in the left midlung are fairly similar to the prior  study.. No pneumothorax or pleural effusion.       Impression:       Increased airspace opacity in the right mid lung may represent  atelectasis or developing infiltrate.     This report was finalized on 4/13/2017 7:55 AM by Dr. Cliff Campos MD.             PROCEDURES  Procedure(s):  TOTAL HIP ARTHROPLASTY REVISION, depuy & baljeet, cultures taken, 7fr hemovac placement    Condition on Discharge: Stable    Physical Exam at Discharge  Vital Signs  Temp:  [97.4 °F (36.3 °C)-100.5 °F (38.1 °C)] 99 °F (37.2 °C)  Heart Rate:  [] 88  Resp:  [20-24] 20  BP: (102-140)/(64-91) 107/73    Physical Exam:  Physical Exam   Constitutional: Patient appears well-developed and well-nourished and in no acute distress   Cardiovascular: Regular rate, regular rhythm, S1 normal and S2 normal.  Exam reveals no gallop and no friction rub.  No murmur heard.  Pulmonary/Chest: Lungs are diminished to auscultation bilaterally. No respiratory distress. No wheezes. No rhonchi. No rales.   Abdominal: Soft. Bowel sounds are normal. No distension and no mass. There is no tenderness.   Extremities: No edema or calf asymmetry.  Radial pulses are palpable and equal.  Neurological: Patient is alert and oriented to person, place, and time. Cranial nerves II-XII are grossly intact with no focal deficits.    Discharge Disposition  Nursing Home    Visiting Nurse:    No     Home PT/OT:  No     Home Safety Evaluation:  No     DME  None    Discharge Diet:           Dietary Orders            Start     Ordered    04/11/17 3387  Diet Regular  Diet Effective Now     Question:  Diet Texture / Consistency  Answer:  Regular    04/11/17 1768          Activity at Discharge:  As per Ortho      Follow-up Appointments  Future Appointments  Date Time Provider Department Center   4/26/2017 2:00 PM Piter  MD [facility] None     Additional Instructions for the Follow-ups that You Need to Schedule     XR Pelvis 1 or 2 View    As directed    Reason for Exam:  PREOP TESTING   Patient Pregnant:  No   AP VIEW                 Test Results Pending at Discharge   Order Current Status    Anaerobic Culture In process           Salvador Del Rio MD  04/14/17  10:27 AM

## 2017-04-14 NOTE — PROGRESS NOTES
Orthopedic Progress Note   Chief Complaint:  Status post acetabular revision left total hip    Subjective     Interval History: Patient is postop day 3.  She is afebrile.  Hemoglobin is down to 7.2.  Still is not been mobilized the patient is still not moving the left leg or the left foot.  She does have movement in the leg itself but again she cannot move the foot but that was unchanged from her preoperative status.  No sensory loss in the leg.  Motor function is intact with the patient is just not willing to move.           Objective     Vital Signs  Temp:  [97.4 °F (36.3 °C)-100.5 °F (38.1 °C)] 99 °F (37.2 °C)  Heart Rate:  [] 96  Resp:  [20-24] 20  BP: (102-140)/(64-91) 107/73  Body mass index is 28.19 kg/(m^2).    Intake/Output Summary (Last 24 hours) at 04/14/17 0647  Last data filed at 04/14/17 0640   Gross per 24 hour   Intake             1440 ml   Output                0 ml   Net             1440 ml     I/O this shift:  In: 240 [P.O.:240]  Out: -        Physical Exam:   General: patient awake, alert and cooperative   Cardiovascular: regular rhythm and rate   Pulm: clear to auscultation bilaterally   Abdomen: Benign.  Soft bowel sounds   Extremities: No sensory loss and no motor loss of the left lower leg other than the foot and ankle.  Wound remains benign.   Neurologic: Normal mood and behavior     Results Review:     I reviewed the patient's new clinical results.      WBC No results found for: WBCS   HGB Hemoglobin   Date Value Ref Range Status   04/14/2017 7.2 (C) 12.0 - 16.0 g/dL Final   04/13/2017 7.8 (L) 12.0 - 16.0 g/dL Final   04/12/2017 9.0 (L) 12.0 - 16.0 g/dL Final   04/11/2017 11.5 (L) 12.0 - 16.0 g/dL Final      HCT Hematocrit   Date Value Ref Range Status   04/14/2017 24.7 (L) 37.0 - 47.0 % Final   04/13/2017 26.4 (L) 37.0 - 47.0 % Final   04/12/2017 30.5 (L) 37.0 - 47.0 % Final   04/11/2017 38.6 37.0 - 47.0 % Final      Platlets No results found for: LABPLAT     PT/INR:  No results  found for: PROTIME/No results found for: INR    Sodium Sodium   Date Value Ref Range Status   04/13/2017 134 (L) 136 - 145 mmol/L Final   04/12/2017 134 (L) 136 - 145 mmol/L Final      Potassium Potassium   Date Value Ref Range Status   04/13/2017 4.7 3.5 - 5.2 mmol/L Final   04/12/2017 5.0 3.5 - 5.2 mmol/L Final   04/11/2017 4.3 3.5 - 5.2 mmol/L Final      Chloride Chloride   Date Value Ref Range Status   04/13/2017 98 98 - 107 mmol/L Final   04/12/2017 98 98 - 107 mmol/L Final      Bicarbonate No results found for: PLASMABICARB   BUN BUN   Date Value Ref Range Status   04/13/2017 10 6 - 20 mg/dL Final   04/12/2017 16 6 - 20 mg/dL Final      Creatinine Creatinine   Date Value Ref Range Status   04/13/2017 0.77 0.57 - 1.00 mg/dL Final   04/12/2017 0.99 0.57 - 1.00 mg/dL Final      Calcium Calcium   Date Value Ref Range Status   04/13/2017 8.3 (L) 8.6 - 10.5 mg/dL Final   04/12/2017 8.9 8.6 - 10.5 mg/dL Final      Magnesium  AST  ALT  Bilirubin, Total  AlkPhos  Albumin    Amylase  Lipase    Radiology: No results found for: MG  No components found for: AST.*  No components found for: ALT.*  No components found for: BILIRUBIN, TOTAL.*    No components found for: ALKPHOS.*  No components found for: ALBUMIN.*      No components found for: AMYLASE.*  No components found for: LIPASE.*            Imaging Results (most recent)     Procedure Component Value Units Date/Time    XR Hip With or Without Pelvis 2 - 3 View Left [[id number]] Collected:  04/11/17 0901     Updated:  04/11/17 0905    Narrative:       LEFT HIP, 04/11/2017:     HISTORY:  56-year-old female status post left hip arthroplasty after hip fracture  December 2015. Preoperative evaluation prior to reported revision hip  surgery.     TECHNIQUE:  Two view left hip series.     FINDINGS:  The examination shows significant chronic protrusion of the acetabular  cup component into the hip with some surrounding ossification. The  femoral component is well seated, and there  is no hip dislocation or  subluxation. No visible fracture involving the remainder of the  visualized left hemipelvis.       Impression:       1. 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  2. No acute osseous abnormality.     This report was finalized on 4/11/2017 9:03 AM by Dr. Jerome Espino MD.       XR Hip With or Without Pelvis 1 View Left [[id number]] Collected:  04/11/17 1418     Updated:  04/11/17 1421    Narrative:       POSTOP LEFT HIP, 04/11/2017:     HISTORY:  Postop revision left hip arthroplasty.     TECHNIQUE:  Single AP radiograph of the left hip was obtained portably following  surgery.     FINDINGS:  Postop changes revision left hip arthroplasty. No visible fracture or  dislocation.     This report was finalized on 4/11/2017 2:18 PM by Dr. Jerome Espino MD.       XR Chest 1 View [[id number]] Collected:  04/13/17 0753     Updated:  04/13/17 0757    Narrative:       INDICATION: Shortness of air status post hip surgery. Postop day #1.      COMPARISON:  12/10/2015     FINDINGS:  Single portable AP view of the chest.     Heart and mediastinal contours are unchanged. There is a dense airspace  opacity in the right midlung, increased from the prior study. Linear  airspace opacities in the left midlung are fairly similar to the prior  study.. No pneumothorax or pleural effusion.       Impression:       Increased airspace opacity in the right mid lung may represent  atelectasis or developing infiltrate.     This report was finalized on 4/13/2017 7:55 AM by Dr. Cliff Campos MD.                       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      Piter Chavez MD  04/14/17  6:47 AM

## 2017-04-14 NOTE — THERAPY DISCHARGE NOTE
Acute Care - Physical Therapy Discharge Summary   Marlene De La Garza       Patient Name: Mariangel Thomas  : 1960  MRN: [id number]    Today's Date: 2017  Onset of Illness/Injury or Date of Surgery Date: 17    Date of Referral to PT: 17  Referring Physician: Dr. Chavez      Admit Date: 2017      PT Recommendation and Plan    Visit Dx:    ICD-10-CM ICD-9-CM   1. 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    Turning from your back to your side while in flat bed without using bedrails?  1  -BP     Moving from lying on back to sitting on the side of a flat bed without bedrails?  1  -BP     Moving to and from a bed to a chair (including a wheelchair)?  1  -BP     Standing up from a chair using your arms (e.g., wheelchair, bedside chair)?  1  -BP     Climbing 3-5 steps with a railing?  1  -BP     To walk in hospital room?  1  -BP     AM-PAC 6 Clicks Score  6  -BP     How much help from another is currently needed...    Putting on and taking off regular lower body clothing? (P)  1  -MH      Bathing (including washing, rinsing, and drying) (P)  1  -MH      Toileting (which includes using toilet bed pan or urinal) (P)  1  -MH      Putting on and taking off regular upper body clothing (P)  1  -MH      Taking care of personal grooming (such as brushing teeth) (P)  1  -MH      Eating meals (P)  4  -MH      Score (P)  9  -MH      Functional Assessment    Outcome Measure Options (P)  AM-PAC 6 Clicks Daily Activity (OT)  -MH AM-PAC 6 Clicks Basic Mobility (PT)  -BP --  -JJ (r) ATUL (t) MUKESH (c)      17 0957 17 0936       How much help from another person do you currently need...    Turning from your back to your side while in flat bed without using bedrails?  1  -BP     Moving from lying on back to sitting on the side of a flat bed without bedrails?  1  -BP     Moving to and  from a bed to a chair (including a wheelchair)?  1  -BP     Standing up from a chair using your arms (e.g., wheelchair, bedside chair)?  1  -BP     Climbing 3-5 steps with a railing?  1  -BP     To walk in hospital room?  1  -BP     AM-PAC 6 Clicks Score  6  -BP     How much help from another is currently needed...    Putting on and taking off regular lower body clothing? 1  -JJ (r) MH (t) JJ (c)      Bathing (including washing, rinsing, and drying) 1  -JJ (r) MH (t) JJ (c)      Toileting (which includes using toilet bed pan or urinal) 1  -JJ (r) MH (t) JJ (c)      Putting on and taking off regular upper body clothing 1  -JJ (r) MH (t) JJ (c)      Taking care of personal grooming (such as brushing teeth) 1  -JJ (r) MH (t) JJ (c)      Eating meals 4  -JJ (r) MH (t) JJ (c)      Score 9  -JJ (r) MH (t)      Functional Assessment    Outcome Measure Options AM-PAC 6 Clicks Daily Activity (OT)  -JJ (r) MH (t) JJ (c) AM-PAC 6 Clicks Basic Mobility (PT)  -BP       User Key  (r) = Recorded By, (t) = Taken By, (c) = Cosigned By    Initials Name Provider Type    MUKESH Handy, OTR Occupational Therapist    BP Rae Mckinney, PT Physical Therapist     Izabel Gupta, OT Student OT Student                      IP PT Goals       04/14/17 1358 04/13/17 1034       Bed Mobility PT STG    Bed Mobility PT STG, Date Established  04/13/17  -BP     Bed Mobility PT STG, Time to Achieve  3 days  -BP     Bed Mobility PT STG, Activity Type  supine to sit/sit to supine  -BP     Bed Mobility PT STG, [place] Level  maximum assist (25% patient effort)  -BP     Bed Mobility PT STG, Date Goal Reviewed 04/14/17  -BP      Bed Mobility PT STG, Outcome goal not met   max x 3 to dependent x 3   -BP      Bed Mobility PT STG, Reason Goal Not Met discharged from facility  -BP      Transfer Training PT STG    Transfer Training PT STG, Date Established  04/13/17  -BP     Transfer Training PT STG, Time to Achieve  3 days  -BP     Transfer  Training PT STG, Activity Type  sit to stand/stand to sit  -BP     Transfer Training PT STG, [place] Level  maximum assist (25% patient effort)  -BP     Transfer Training PT STG, Date Goal Reviewed 04/14/17  -BP      Transfer Training PT STG, Outcome goal not met   max A x 2   -BP      Transfer Training PT STG, Reason Goal Not Met discharged from facility  -BP      Transfer Training 2 PT STG    Transfer Training PT 2 STG, Date Established  04/13/17  -BP     Transfer Training PT 2 STG, Time to Achieve  3 days  -BP     Transfer Training PT 2 STG, Activity Type  bed to chair /chair to bed  -BP     Transfer Training PT 2 STG, [place] Level  maximum assist (25% patient effort);2 person assist required  -BP     Transfer Training PT 2 STG, Assist Device  --   appropriate assistive device  -BP     Transfer Training PT 2 STG, Date Goal Reviewed 04/14/17  -BP      Transfer Training PT 2 STG, Outcome goal not met   unable to safely attempt   -BP      Transfer Training 2 PT STG, Reason Goal Not Met discharged from facility  -BP        User Key  (r) = Recorded By, (t) = Taken By, (c) = Cosigned By    Initials Name Provider Type    BP Rae Mckinney, PT Physical Therapist          Therapy Charges for Today     Code Description Service Date Service Provider Modifiers Qty    42608155580 HC PT EVAL MOD COMPLEXITY 2 4/13/2017 Rae Mckinney, PT GP 1    29129579720 HC PT THER PROC EA 15 MIN 4/13/2017 Rae Mckinney, PT GP 1    44611689504 HC PT THER SUPP EA 15 MIN 4/13/2017 Rae Mckinney, PT GP 1          PT Discharge Summary  Anticipated Discharge Disposition: skilled nursing facility, extended care facility  Reason for Discharge: Discharge from facility  Outcomes Achieved:  (Patient made minimal progress with PT. Met no functional goals.)  Discharge Destination: SNF, Extended care facility - LTC (Patient being discharged to [facility] )  Patient made minimal progress with PT and required max A x  2-3 for bed mobility and sit to/from stand transfers. Unable to safely attempt surface to surface transfers. Patient being discharged to [facility] today where she is a resident. Patient will continue with PT at facility.     Rae Mckinney, PT   4/14/2017

## 2017-04-14 NOTE — PLAN OF CARE
Problem: Patient Care Overview (Adult)  Goal: Plan of Care Review  Outcome: Unable to achieve outcome(s) by discharge Date Met:  04/14/17 04/14/17 0948   Coping/Psychosocial Response Interventions   Plan Of Care Reviewed With patient   Patient Care Overview   Progress no change   Outcome Evaluation   Outcome Summary/Follow up Plan OT: Pt able to recall 2/3 hip precautions this a.m. Pt provided with handout/demonstration of hip precautions and educated on safety techniques with LB ADL activities, and transfers. Pt states that she has no further concerns managing her daily routine upon discharge to extended care facility. [facility] Recommend continued assist for ADl activity, pt not able to adhere to hip precuations. Recommend continued therapy at extended care facility.

## 2017-04-14 NOTE — PLAN OF CARE
Problem: Inpatient Physical Therapy  Goal: Bed Mobility Goal STG- PT  Outcome: Unable to achieve outcome(s) by discharge Date Met:  04/14/17 04/13/17 1034 04/14/17 1358   Bed Mobility PT STG   Bed Mobility PT STG, Date Established 04/13/17 --    Bed Mobility PT STG, Time to Achieve 3 days --    Bed Mobility PT STG, Activity Type supine to sit/sit to supine --    Bed Mobility PT STG, [place] Level maximum assist (25% patient effort) --    Bed Mobility PT STG, Date Goal Reviewed --  04/14/17   Bed Mobility PT STG, Outcome --  goal not met  (max x 3 to dependent x 3 )   Bed Mobility PT STG, Reason Goal Not Met --  discharged from facility       Goal: Transfer Training Goal 1 STG- PT  Outcome: Unable to achieve outcome(s) by discharge Date Met:  04/14/17 04/13/17 1034 04/14/17 1358   Transfer Training PT STG   Transfer Training PT STG, Date Established 04/13/17 --    Transfer Training PT STG, Time to Achieve 3 days --    Transfer Training PT STG, Activity Type sit to stand/stand to sit --    Transfer Training PT STG, [place] maximum assist (25% patient effort) --    Transfer Training PT STG, Date Goal Reviewed --  04/14/17   Transfer Training PT STG, Outcome --  goal not met  (max A x 2 )   Transfer Training PT STG, Reason Goal Not Met --  discharged from facility       Goal: Transfer Training Goal 2 STG- PT  Outcome: Unable to achieve outcome(s) by discharge Date Met:  04/14/17 04/13/17 1034 04/14/17 1358   Transfer Training 2 PT STG   Transfer Training PT 2 STG, Date Established 04/13/17 --    Transfer Training PT 2 STG, Time to Achieve 3 days --    Transfer Training PT 2 STG, Activity Type bed to chair /chair to bed --    Transfer Training PT 2 STG, [place] maximum assist (25% patient effort);2 person assist required --    Transfer Training PT 2 STG, Assist Device (appropriate assistive device) --    Transfer Training PT 2 STG, Date Goal Reviewed --  04/14/17   Transfer Training PT  2 STG, Outcome --  goal not met  (unable to safely attempt )   Transfer Training 2 PT STG, Reason Goal Not Met --  discharged from facility

## 2017-04-15 LAB
ABO + RH BLD: NORMAL
BH BB BLOOD EXPIRATION DATE: NORMAL
BH BB BLOOD TYPE BARCODE: 5100
BH BB DISPENSE STATUS: NORMAL
BH BB PRODUCT CODE: NORMAL
BH BB UNIT NUMBER: NORMAL
CROSSMATCH INTERPRETATION: NORMAL
UNIT  ABO: NORMAL
UNIT  RH: NORMAL

## 2017-04-15 NOTE — PLAN OF CARE
Problem: Patient Care Overview (Adult)  Goal: Discharge Needs Assessment  Outcome: Outcome(s) achieved Date Met:  04/15/17    04/12/17 1044 04/13/17 0957 04/13/17 1112   Discharge Needs Assessment   Concerns To Be Addressed --  --  no discharge needs identified   Readmission Within The Last 30 Days no previous admission in last 30 days --  --    Discharge Planning Comments --  --  --    Living Environment   Transportation Available (will continue to assess) --  --    Self-Care   Equipment Currently Used at Home --  wheelchair;hospital bed --      04/15/17 1416   Discharge Needs Assessment   Concerns To Be Addressed --    Readmission Within The Last 30 Days --    Discharge Planning Comments Discharged to [facility] skilled level of care.    Living Environment   Transportation Available --    Self-Care   Equipment Currently Used at Home --

## 2017-04-16 LAB — BACTERIA SPEC ANAEROBE CULT: NORMAL

## 2017-04-17 ENCOUNTER — TELEPHONE (OUTPATIENT)
Dept: ORTHOPEDIC SURGERY | Facility: CLINIC | Age: 57
End: 2017-04-17

## 2017-04-17 NOTE — TELEPHONE ENCOUNTER
Nicole  With [facility] [phone] states pain is not controlled for pt with taking 2 Percocets 5/325  every 6 hours.  States pain is a 8-9 after about 4 hours.  What should they do?

## 2017-04-18 ENCOUNTER — TELEPHONE (OUTPATIENT)
Dept: ORTHOPEDIC SURGERY | Facility: CLINIC | Age: 57
End: 2017-04-18

## 2017-04-18 NOTE — TELEPHONE ENCOUNTER
[facility] in [place] [phone]  Kaylan needs to know WBS and has staples in.  Do you want the staples removed before her FU visit.  What dressing do they need to put on it?   She is coming back on the 26th of April.

## 2017-04-19 NOTE — TELEPHONE ENCOUNTER
She is weightbearing as tolerated on her left leg.  This should do daily dressing changes to the wound and the skin staples should come out next Thursday or Friday

## 2017-05-01 ENCOUNTER — OFFICE VISIT (OUTPATIENT)
Dept: ORTHOPEDIC SURGERY | Facility: CLINIC | Age: 57
End: 2017-05-01

## 2017-05-01 DIAGNOSIS — Z96.649 S/P REVISION OF TOTAL HIP: ICD-10-CM

## 2017-05-01 DIAGNOSIS — M24.7 ACETABULAR PROTRUSION: Primary | ICD-10-CM

## 2017-05-01 PROCEDURE — 99024 POSTOP FOLLOW-UP VISIT: CPT | Performed by: ORTHOPAEDIC SURGERY

## 2017-05-01 RX ORDER — CEPHALEXIN 500 MG/1
500 CAPSULE ORAL 2 TIMES DAILY
Status: ON HOLD | COMMUNITY
End: 2019-10-13

## 2017-06-12 ENCOUNTER — OFFICE VISIT (OUTPATIENT)
Dept: ORTHOPEDIC SURGERY | Facility: CLINIC | Age: 57
End: 2017-06-12

## 2017-06-12 DIAGNOSIS — M24.7 ACETABULAR PROTRUSION: Primary | ICD-10-CM

## 2017-06-12 DIAGNOSIS — R20.2 PARESTHESIA OF LEFT FOOT: ICD-10-CM

## 2017-06-12 DIAGNOSIS — Z96.649 S/P REVISION OF TOTAL HIP: ICD-10-CM

## 2017-06-12 PROCEDURE — 99024 POSTOP FOLLOW-UP VISIT: CPT | Performed by: ORTHOPAEDIC SURGERY

## 2017-06-12 NOTE — PROGRESS NOTES
Subjective: Status post revision left acetabular reconstruction, total hip revision     Patient ID: Mariangel Thomas is a 56 y.o. female.    Chief Complaint: Left leg numbness    History of Present Illness patient is 2 months status post revision of a left total hip acetabular reconstruction protrusio.  The standpoint of states she is doing well with resolution of her pain but has persistent numbness in the knee to her foot.  Cannot actively dorsi or plantar flex the foot.  The numbness she states that since surgery although she had some difficulty dorsi flexion prior to surgery but she complains of circumferential numbness from the knee distally but none proximally.       Social History     Occupational History   • Not on file.     Social History Main Topics   • Smoking status: Current Every Day Smoker     Packs/day: 0.50     Years: 20.00     Types: Cigarettes   • Smokeless tobacco: Never Used   • Alcohol use No   • Drug use: No   • Sexual activity: Defer      Review of Systems   Constitutional: Negative for chills, diaphoresis, fever and unexpected weight change.   HENT: Negative for hearing loss, nosebleeds, sore throat and tinnitus.    Eyes: Negative for pain and visual disturbance.   Respiratory: Negative for cough, shortness of breath and wheezing.    Cardiovascular: Negative for chest pain and palpitations.   Gastrointestinal: Negative for abdominal pain, diarrhea, nausea and vomiting.   Endocrine: Negative for cold intolerance, heat intolerance and polydipsia.   Genitourinary: Negative for difficulty urinating, dysuria and hematuria.   Musculoskeletal: Negative for arthralgias, joint swelling and myalgias.   Skin: Negative for rash and wound.   Allergic/Immunologic: Negative for environmental allergies.   Neurological: Negative for dizziness, syncope and numbness.   Hematological: Does not bruise/bleed easily.   Psychiatric/Behavioral: Negative for dysphoric mood and sleep disturbance. The patient is not  nervous/anxious.    All other systems reviewed and are negative.        Past Medical History:   Diagnosis Date   • Anxiety    • Chronic respiratory failure    • COPD (chronic obstructive pulmonary disease)    • Disc disorder     lumbar/thoracic   • Fracture of hip     sched total hip revision   • GERD (gastroesophageal reflux disease)    • Incontinent of urine     incontinent of bowel & bladder   • Insomnia    • Kidney failure    • Lumbosacral disc disease    • Muscle weakness     generalized   • Neuropathy    • OA (osteoarthritis)    • Psychosis    • Severe sepsis without septic shock      Past Surgical History:   Procedure Laterality Date   •  SECTION     • CHOLECYSTECTOMY     • TOTAL HIP ARTHROPLASTY Left 2015   • TOTAL HIP ARTHROPLASTY Left 2017    Procedure: TOTAL HIP ARTHROPLASTY REVISION, depuy & baljeet, cultures taken, 7fr hemovac placement;  Surgeon: Piter Chavez MD;  Location: [facility];  Service:    • TUBAL ABDOMINAL LIGATION       No family history on file.      Objective:  There were no vitals filed for this visit.  There were no vitals filed for this visit.  There is no height or weight on file to calculate BMI.       Ortho Exam  x-ray of the left hip at the nursing home shows excellent position of the implant.  On exam there is no pain with passive range of motion of her hip and she can actively and the knee but she has no active dorsi flexion and a volar flexion of the foot.  There is numbness to the calf with no sign of compartmental syndrome and she does have good distal pulses.  The skin is cool to touch.  There is some fusiform swelling to the foot and ankle but there are no open blisters or wounds.    Assessment:       1. Acetabular protrusion    2. S/P revision of total hip    3. Paresthesia of left foot          Plan:      I want to nursing facility definitive for AFOs today can begin weightbearing gait exercises.  She needs an EMG nerve conduction study that left leg to  evaluate for any nerve injury.  Return to see me after the EMG nerve conduction study has been completed.    MRI recently completed shows injury to the sciatic nerve.  Is recommended that the patient have an MRI to evaluate the sciatic nerve to determine if surgical repair of that nerve is indicated or possible.  Recommend MRI of her left hip evaluated in the sciatic nerve ASAP  Work Status:    [facility] query complete.    Orders:  No orders of the defined types were placed in this encounter.      Medications:  No orders of the defined types were placed in this encounter.      Followup:  Return in about 4 weeks (around 7/10/2017).          Dragon transcription disclaimer     Much of this encounter note is an electronic transcription/translation of spoken language to printed text. The electronic translation of spoken language may permit erroneous, or at times, nonsensical words or phrases to be inadvertently transcribed. Although I have reviewed the note for such errors, some may still exist.

## 2017-06-28 DIAGNOSIS — Z96.642 STATUS POST TOTAL REPLACEMENT OF LEFT HIP: Primary | ICD-10-CM

## 2017-07-25 ENCOUNTER — TELEPHONE (OUTPATIENT)
Dept: ORTHOPEDIC SURGERY | Facility: CLINIC | Age: 57
End: 2017-07-25

## 2017-07-25 NOTE — TELEPHONE ENCOUNTER
Patient is scheduled for EMG/NVC 08/03 by the nursing facility.  Dr Gibson office called for order.  I will request this order from Dr Chavez and fax to Dr Gibson.

## 2017-08-01 DIAGNOSIS — M21.372 LEFT FOOT DROP: Primary | ICD-10-CM

## 2017-08-04 DIAGNOSIS — M21.372 LEFT FOOT DROP: ICD-10-CM

## 2017-09-22 ENCOUNTER — TRANSCRIBE ORDERS (OUTPATIENT)
Dept: ADMINISTRATIVE | Facility: HOSPITAL | Age: 57
End: 2017-09-22

## 2017-09-22 DIAGNOSIS — R91.8 PULMONARY INFILTRATE: Primary | ICD-10-CM

## 2017-10-23 ENCOUNTER — OFFICE VISIT (OUTPATIENT)
Dept: ORTHOPEDIC SURGERY | Facility: CLINIC | Age: 57
End: 2017-10-23

## 2017-10-23 DIAGNOSIS — Z96.642 STATUS POST TOTAL REPLACEMENT OF LEFT HIP: ICD-10-CM

## 2017-10-23 DIAGNOSIS — M21.372 LEFT FOOT DROP: Primary | ICD-10-CM

## 2017-10-23 DIAGNOSIS — Z96.649 S/P REVISION OF TOTAL HIP: ICD-10-CM

## 2017-10-23 PROCEDURE — 99212 OFFICE O/P EST SF 10 MIN: CPT | Performed by: ORTHOPAEDIC SURGERY

## 2017-10-23 NOTE — PROGRESS NOTES
Subjective: Status post revision left total hip with foot drop     Patient ID: Mariangel Thomas is a 57 y.o. female.    Chief Complaint:    History of Present Illness patient is 6 months out from her surgery.  The EMG confirmed a sciatic nerve injury with the left foot drop.  It is not giving her any pain but she cannot dorsiflex the foot.       Social History     Occupational History   • Not on file.     Social History Main Topics   • Smoking status: Current Every Day Smoker     Packs/day: 0.50     Years: 20.00     Types: Cigarettes   • Smokeless tobacco: Never Used   • Alcohol use No   • Drug use: No   • Sexual activity: Defer      Review of Systems   Constitutional: Negative for chills, diaphoresis, fever and unexpected weight change.   HENT: Negative for hearing loss, nosebleeds, sore throat and tinnitus.    Eyes: Negative for pain and visual disturbance.   Respiratory: Negative for cough, shortness of breath and wheezing.    Cardiovascular: Negative for chest pain and palpitations.   Gastrointestinal: Negative for abdominal pain, diarrhea, nausea and vomiting.   Endocrine: Negative for cold intolerance, heat intolerance and polydipsia.   Genitourinary: Negative for difficulty urinating, dysuria and hematuria.   Musculoskeletal: Negative for arthralgias, joint swelling and myalgias.   Skin: Negative for rash and wound.   Allergic/Immunologic: Negative for environmental allergies.   Neurological: Negative for dizziness, syncope and numbness.   Hematological: Does not bruise/bleed easily.   Psychiatric/Behavioral: Negative for dysphoric mood and sleep disturbance. The patient is not nervous/anxious.          Past Medical History:   Diagnosis Date   • Anxiety    • Chronic respiratory failure    • COPD (chronic obstructive pulmonary disease)    • Disc disorder     lumbar/thoracic   • Fracture of hip     sched total hip revision   • GERD (gastroesophageal reflux disease)    • Incontinent of urine     incontinent of bowel  & bladder   • Insomnia    • Kidney failure    • Lumbosacral disc disease    • Muscle weakness     generalized   • Neuropathy    • OA (osteoarthritis)    • Psychosis    • Severe sepsis without septic shock      Past Surgical History:   Procedure Laterality Date   •  SECTION     • CHOLECYSTECTOMY     • TOTAL HIP ARTHROPLASTY Left 2015   • TOTAL HIP ARTHROPLASTY Left 2017    Procedure: TOTAL HIP ARTHROPLASTY REVISION, depuy & baljeet, cultures taken, 7fr hemovac placement;  Surgeon: Piter Chavez MD;  Location: [facility];  Service:    • TUBAL ABDOMINAL LIGATION       No family history on file.      Objective:  There were no vitals filed for this visit.  There were no vitals filed for this visit.  There is no height or weight on file to calculate BMI.       Ortho Exam  x-rays of the hip sent with her show excellent position of the hip joint with stable hip.  She does have foot drop on that left side.  No active dorsiflexion.    Assessment:       1. Left foot drop    2. Status post total replacement of left hip    3. S/P revision of total hip          Plan:      As I discussed with her preoperatively and reviewed again today foot drop is a potential complication from the surgery.  Whether that stretched her bruises during the surgery at this point unable to determine but I'm going to get her fitted for an AFO on that left foot to allow for ambulation and she has not been walking.  Return to see me in 4 weeks.  No x-ray necessary but I want [facility] in [place] to fit her for the AFO in the left leg      Work Status:    [facility] query complete.    Orders:  No orders of the defined types were placed in this encounter.      Medications:  No orders of the defined types were placed in this encounter.      Followup:  Return in about 4 weeks (around 2017).          Dragon transcription disclaimer     Much of this encounter note is an electronic transcription/translation of spoken language to  printed text. The electronic translation of spoken language may permit erroneous, or at times, nonsensical words or phrases to be inadvertently transcribed. Although I have reviewed the note for such errors, some may still exist.

## 2017-11-20 ENCOUNTER — OFFICE VISIT (OUTPATIENT)
Dept: ORTHOPEDIC SURGERY | Facility: CLINIC | Age: 57
End: 2017-11-20

## 2017-11-20 VITALS — HEIGHT: 68 IN | WEIGHT: 185 LBS | BODY MASS INDEX: 28.04 KG/M2

## 2017-11-20 DIAGNOSIS — Z96.642 STATUS POST TOTAL REPLACEMENT OF LEFT HIP: ICD-10-CM

## 2017-11-20 DIAGNOSIS — M21.372 LEFT FOOT DROP: Primary | ICD-10-CM

## 2017-11-20 PROCEDURE — 99212 OFFICE O/P EST SF 10 MIN: CPT | Performed by: ORTHOPAEDIC SURGERY

## 2017-11-20 NOTE — PROGRESS NOTES
Subjective: Status post revision right total hip with foot drop     Patient ID: Mariangel Thomas is a 57 y.o. female.    Chief Complaint:    History of Present Illness patient returns today she's been fitted for her AFO.  She does not have it with her but states it is at the nursing home.       Social History     Occupational History   • Not on file.     Social History Main Topics   • Smoking status: Current Every Day Smoker     Packs/day: 0.50     Years: 20.00     Types: Cigarettes   • Smokeless tobacco: Never Used   • Alcohol use No   • Drug use: No   • Sexual activity: Defer      Review of Systems   Constitutional: Negative for chills, diaphoresis, fever and unexpected weight change.   HENT: Negative for hearing loss, nosebleeds, sore throat and tinnitus.    Eyes: Negative for pain and visual disturbance.   Respiratory: Negative for cough, shortness of breath and wheezing.    Cardiovascular: Negative for chest pain and palpitations.   Gastrointestinal: Negative for abdominal pain, diarrhea, nausea and vomiting.   Endocrine: Negative for cold intolerance, heat intolerance and polydipsia.   Genitourinary: Negative for difficulty urinating, dysuria and hematuria.   Musculoskeletal: Positive for arthralgias and myalgias. Negative for joint swelling.   Skin: Negative for rash and wound.   Allergic/Immunologic: Negative for environmental allergies.   Neurological: Negative for dizziness, syncope and numbness.   Hematological: Does not bruise/bleed easily.   Psychiatric/Behavioral: Negative for dysphoric mood and sleep disturbance. The patient is not nervous/anxious.          Past Medical History:   Diagnosis Date   • Anxiety    • Chronic respiratory failure    • COPD (chronic obstructive pulmonary disease)    • Disc disorder     lumbar/thoracic   • Fracture of hip     sched total hip revision   • GERD (gastroesophageal reflux disease)    • Incontinent of urine     incontinent of bowel & bladder   • Insomnia    • Kidney  failure    • Lumbosacral disc disease    • Muscle weakness     generalized   • Neuropathy    • OA (osteoarthritis)    • Psychosis    • Severe sepsis without septic shock      Past Surgical History:   Procedure Laterality Date   •  SECTION     • CHOLECYSTECTOMY     • TOTAL HIP ARTHROPLASTY Left 2015   • TOTAL HIP ARTHROPLASTY Left 2017    Procedure: TOTAL HIP ARTHROPLASTY REVISION, depuy & baljeet, cultures taken, 7fr hemovac placement;  Surgeon: Piter Chavez MD;  Location: [facility];  Service:    • TUBAL ABDOMINAL LIGATION       History reviewed. No pertinent family history.      Objective:  There were no vitals filed for this visit.  Last 3 weights    17  1320   Weight: 185 lb (83.9 kg)     Body mass index is 28.55 kg/(m^2).       Ortho Exam  she is alert and oriented ×3.  Still has the foot drop on that left side.  No change otherwise.  She is not having significant pain.  His no other deficit noted.    Assessment:       1. Left foot drop    2. Status post total replacement of left hip          Plan:       begin daily physical therapy gait training weightbearing as tolerated with the AFO in place.  Return to see me in 4 weeks and I want an AP of the pelvis done one day prior to that visit and the x-ray sent with the patient.      Work Status:    [facility] query complete.    Orders:  No orders of the defined types were placed in this encounter.      Medications:  No orders of the defined types were placed in this encounter.      Followup:  Return in about 4 weeks (around 2017).          Dragon transcription disclaimer     Much of this encounter note is an electronic transcription/translation of spoken language to printed text. The electronic translation of spoken language may permit erroneous, or at times, nonsensical words or phrases to be inadvertently transcribed. Although I have reviewed the note for such errors, some may still exist.

## 2018-02-01 ENCOUNTER — OFFICE VISIT (OUTPATIENT)
Dept: ORTHOPEDIC SURGERY | Facility: CLINIC | Age: 58
End: 2018-02-01

## 2018-02-01 DIAGNOSIS — Z96.642 STATUS POST TOTAL REPLACEMENT OF LEFT HIP: ICD-10-CM

## 2018-02-01 DIAGNOSIS — M21.372 LEFT FOOT DROP: Primary | ICD-10-CM

## 2018-02-01 DIAGNOSIS — Z96.649 S/P REVISION OF TOTAL HIP: ICD-10-CM

## 2018-02-01 PROCEDURE — 99214 OFFICE O/P EST MOD 30 MIN: CPT | Performed by: ORTHOPAEDIC SURGERY

## 2018-02-01 NOTE — PROGRESS NOTES
Subjective: Status post revision left total hip     Patient ID: Mariangel Thomas is a 57 y.o. female.    Chief Complaint:    History of Present Illness patient is 10 months status post surgery to that left hip.  She developed a foot drop postoperatively.  According to the nursing facility where she is at in [place] she has refused physical therapy but according to the patient she has not.  Does not have an AFO as ordered for that left foot drop and she has not been doing according to the patient physical therapy is seen today in a wheelchair with unable to bear weight on that left leg.       Social History     Occupational History   • Not on file.     Social History Main Topics   • Smoking status: Current Every Day Smoker     Packs/day: 0.50     Years: 20.00     Types: Cigarettes   • Smokeless tobacco: Never Used   • Alcohol use No   • Drug use: No   • Sexual activity: Defer      Review of Systems   Constitutional: Negative for chills, diaphoresis, fever and unexpected weight change.   HENT: Negative for hearing loss, nosebleeds, sore throat and tinnitus.    Eyes: Negative for pain and visual disturbance.   Respiratory: Negative for cough, shortness of breath and wheezing.    Cardiovascular: Negative for chest pain and palpitations.   Gastrointestinal: Negative for abdominal pain, diarrhea, nausea and vomiting.   Endocrine: Negative for cold intolerance, heat intolerance and polydipsia.   Genitourinary: Negative for difficulty urinating, dysuria and hematuria.   Musculoskeletal: Positive for arthralgias. Negative for joint swelling and myalgias.   Skin: Negative for rash and wound.   Allergic/Immunologic: Negative for environmental allergies.   Neurological: Negative for dizziness, syncope and numbness.   Hematological: Does not bruise/bleed easily.   Psychiatric/Behavioral: Negative for dysphoric mood and sleep disturbance. The patient is not nervous/anxious.          Past Medical History:   Diagnosis Date   •  Anxiety    • Chronic respiratory failure    • COPD (chronic obstructive pulmonary disease)    • Disc disorder     lumbar/thoracic   • Fracture of hip     sched total hip revision   • GERD (gastroesophageal reflux disease)    • Incontinent of urine     incontinent of bowel & bladder   • Insomnia    • Kidney failure    • Lumbosacral disc disease    • Muscle weakness     generalized   • Neuropathy    • OA (osteoarthritis)    • Psychosis    • Severe sepsis without septic shock      Past Surgical History:   Procedure Laterality Date   •  SECTION     • CHOLECYSTECTOMY     • TOTAL HIP ARTHROPLASTY Left 2015   • TOTAL HIP ARTHROPLASTY Left 2017    Procedure: TOTAL HIP ARTHROPLASTY REVISION, depuy & baljeet, cultures taken, 7fr hemovac placement;  Surgeon: Piter Chavez MD;  Location: [facility];  Service:    • TUBAL ABDOMINAL LIGATION       No family history on file.      Objective:  There were no vitals filed for this visit.  There were no vitals filed for this visit.  There is no height or weight on file to calculate BMI.       Ortho Exam  x-rays of the left hip brought with her from the [place] facility and reviewed by me AP oblique and lateral view show excellent position of the implant and the reconstructed acetabular cup.  She does have a persistent foot drop on that left leg.  She has restricted range of motion of the is the patient is not weightbearing.  She can only extend to about 20° as flexion barely to 90°.  His no pain with passive range of motion of the hip as far as internal/external rotation.  Calf is nontender negative Homans.  She can actively extend and flex the hip although limited.  The skin is cool to touch.  There is no sensory loss to the thigh and upper leg.    Assessment:       1. Left foot drop    2. Status post total replacement of left hip    3. S/P revision of total hip          Plan:      I told the patient physical therapy is currently that she wishes to walk again.  Does  not have a nail file on today to one needs to be placed on that leg lengths the patient again refuses to wear and she needs to do therapy again with the patient refuses.  Return to see me in 3 months.      Work Status:    ITZEL query complete.    Orders:  No orders of the defined types were placed in this encounter.      Medications:  No orders of the defined types were placed in this encounter.      Followup:  Return in about 3 months (around 5/1/2018).          Dragon transcription disclaimer     Much of this encounter note is an electronic transcription/translation of spoken language to printed text. The electronic translation of spoken language may permit erroneous, or at times, nonsensical words or phrases to be inadvertently transcribed. Although I have reviewed the note for such errors, some may still exist.

## 2018-03-19 ENCOUNTER — APPOINTMENT (OUTPATIENT)
Dept: CT IMAGING | Facility: HOSPITAL | Age: 58
End: 2018-03-19
Attending: INTERNAL MEDICINE

## 2018-04-02 ENCOUNTER — APPOINTMENT (OUTPATIENT)
Dept: CT IMAGING | Facility: HOSPITAL | Age: 58
End: 2018-04-02
Attending: INTERNAL MEDICINE

## 2018-04-06 ENCOUNTER — HOSPITAL ENCOUNTER (OUTPATIENT)
Dept: CT IMAGING | Facility: HOSPITAL | Age: 58
Discharge: HOME | End: 2018-04-06
Attending: INTERNAL MEDICINE | Admitting: INTERNAL MEDICINE

## 2018-04-06 DIAGNOSIS — R91.8 PULMONARY INFILTRATE: ICD-10-CM

## 2018-04-06 PROCEDURE — 71250 CT THORAX DX C-: CPT

## 2018-05-02 ENCOUNTER — TRANSCRIBE ORDERS (OUTPATIENT)
Dept: ADMINISTRATIVE | Facility: HOSPITAL | Age: 58
End: 2018-05-02

## 2018-05-02 DIAGNOSIS — R91.8 PULMONARY NODULES: Primary | ICD-10-CM

## 2018-05-03 ENCOUNTER — OFFICE VISIT (OUTPATIENT)
Dept: ORTHOPEDIC SURGERY | Facility: CLINIC | Age: 58
End: 2018-05-03

## 2018-05-03 DIAGNOSIS — Z96.649 S/P REVISION OF TOTAL HIP: ICD-10-CM

## 2018-05-03 DIAGNOSIS — Z96.642 STATUS POST TOTAL REPLACEMENT OF LEFT HIP: ICD-10-CM

## 2018-05-03 DIAGNOSIS — M21.372 LEFT FOOT DROP: Primary | ICD-10-CM

## 2018-05-03 PROCEDURE — 99211 OFF/OP EST MAY X REQ PHY/QHP: CPT | Performed by: ORTHOPAEDIC SURGERY

## 2018-05-03 RX ORDER — MELOXICAM 7.5 MG/1
7.5 TABLET ORAL DAILY
Status: ON HOLD | COMMUNITY
End: 2019-10-13

## 2018-05-03 RX ORDER — CETIRIZINE HYDROCHLORIDE 10 MG/1
10 TABLET ORAL NIGHTLY
COMMUNITY

## 2018-05-03 RX ORDER — LORAZEPAM 0.5 MG/1
0.5 TABLET ORAL EVERY 8 HOURS PRN
COMMUNITY

## 2018-05-03 RX ORDER — FOLIC ACID/MULTIVIT,IRON,MINER 0.4MG-18MG
TABLET ORAL
Status: ON HOLD | COMMUNITY
End: 2019-10-13

## 2018-05-03 NOTE — PROGRESS NOTES
Subjective: Status post revision left total hip with postoperative foot drop     Patient ID: Mariangel Thomas is a 57 y.o. female.    Chief Complaint:    History of Present Illness patient is seen once again from the nursing facility in [place].  Patient is not ambulated secondary to foot pain not hip pain.  Return to the caregiver with her she would not attempt to ambulate will not work with therapy and does not having a of old for that foot.  She can move from bed to chair by pivoting on the right leg.  Again no complaints of left hip pain whatsoever       Social History     Occupational History   • Not on file.     Social History Main Topics   • Smoking status: Current Every Day Smoker     Packs/day: 0.50     Years: 20.00     Types: Cigarettes   • Smokeless tobacco: Never Used   • Alcohol use No   • Drug use: No   • Sexual activity: Defer      Review of Systems   Constitutional: Negative for chills, diaphoresis, fever and unexpected weight change.   HENT: Negative for hearing loss, nosebleeds, sore throat and tinnitus.    Eyes: Negative for pain and visual disturbance.   Respiratory: Negative for cough, shortness of breath and wheezing.    Cardiovascular: Negative for chest pain and palpitations.   Gastrointestinal: Negative for abdominal pain, diarrhea, nausea and vomiting.   Endocrine: Negative for cold intolerance, heat intolerance and polydipsia.   Genitourinary: Negative for difficulty urinating, dysuria and hematuria.   Musculoskeletal: Positive for arthralgias. Negative for joint swelling and myalgias.   Skin: Negative for rash and wound.   Allergic/Immunologic: Negative for environmental allergies.   Neurological: Negative for dizziness, syncope and numbness.   Hematological: Does not bruise/bleed easily.   Psychiatric/Behavioral: Negative for dysphoric mood and sleep disturbance. The patient is not nervous/anxious.          Past Medical History:   Diagnosis Date   • Anxiety    • Chronic respiratory  failure    • COPD (chronic obstructive pulmonary disease)    • Disc disorder     lumbar/thoracic   • Fracture of hip     sched total hip revision   • GERD (gastroesophageal reflux disease)    • Incontinent of urine     incontinent of bowel & bladder   • Insomnia    • Kidney failure    • Lumbosacral disc disease    • Muscle weakness     generalized   • Neuropathy    • OA (osteoarthritis)    • Psychosis    • Severe sepsis without septic shock      Past Surgical History:   Procedure Laterality Date   •  SECTION     • CHOLECYSTECTOMY     • TOTAL HIP ARTHROPLASTY Left 2015   • TOTAL HIP ARTHROPLASTY Left 2017    Procedure: TOTAL HIP ARTHROPLASTY REVISION, depuy & baljeet, cultures taken, 7fr hemovac placement;  Surgeon: Piter Chavez MD;  Location: [facility];  Service:    • TUBAL ABDOMINAL LIGATION       No family history on file.      Objective:  There were no vitals filed for this visit.  There were no vitals filed for this visit.  There is no height or weight on file to calculate BMI.       Ortho Exam  patient is alert and oriented ×3.  Again is no pain localized to the hip with range of motion.  He complains of pain in the dorsum of the foot.  There is swelling noted but is good capillary refill.    Assessment:       1. Left foot drop    2. Status post total replacement of left hip    3. S/P revision of total hip          Plan:      been requested the nursing facility x-rayed the foot and ankle to assure there is no fracture.  If there is not and the patient will not wear AFO attempted to ambulate there is nothing can be done at this time.  If there is a fracture she should return to see me otherwise when necessary my recommendations begin an x-ray of that foot and ankle to rule out any fracture      Work Status:    ITZEL query complete.    Orders:  No orders of the defined types were placed in this encounter.      Medications:  No orders of the defined types were placed in this  encounter.      Followup:  Return if symptoms worsen or fail to improve.          Dragon transcription disclaimer     Much of this encounter note is an electronic transcription/translation of spoken language to printed text. The electronic translation of spoken language may permit erroneous, or at times, nonsensical words or phrases to be inadvertently transcribed. Although I have reviewed the note for such errors, some may still exist.

## 2018-05-07 ENCOUNTER — TRANSCRIBE ORDERS (OUTPATIENT)
Dept: ADMINISTRATIVE | Facility: HOSPITAL | Age: 58
End: 2018-05-07

## 2018-05-07 DIAGNOSIS — R91.8 PULMONARY NODULES: Primary | ICD-10-CM

## 2019-10-12 ENCOUNTER — APPOINTMENT (OUTPATIENT)
Dept: GENERAL RADIOLOGY | Facility: HOSPITAL | Age: 59
End: 2019-10-12

## 2019-10-12 ENCOUNTER — HOSPITAL ENCOUNTER (INPATIENT)
Facility: HOSPITAL | Age: 59
LOS: 4 days | Discharge: NURSING, REHAB OR LONG-TERM CARE | End: 2019-10-17
Attending: EMERGENCY MEDICINE | Admitting: HOSPITALIST

## 2019-10-12 DIAGNOSIS — A41.9 SEPSIS WITHOUT ACUTE ORGAN DYSFUNCTION, DUE TO UNSPECIFIED ORGANISM (HCC): ICD-10-CM

## 2019-10-12 DIAGNOSIS — E11.65 TYPE 2 DIABETES MELLITUS WITH HYPERGLYCEMIA, WITHOUT LONG-TERM CURRENT USE OF INSULIN (HCC): ICD-10-CM

## 2019-10-12 DIAGNOSIS — S72.402A CLOSED FRACTURE OF DISTAL END OF LEFT FEMUR, UNSPECIFIED FRACTURE MORPHOLOGY, INITIAL ENCOUNTER (HCC): Primary | ICD-10-CM

## 2019-10-12 DIAGNOSIS — S72.455A: ICD-10-CM

## 2019-10-12 DIAGNOSIS — M21.372 FOOT DROP, LEFT: ICD-10-CM

## 2019-10-12 LAB
ALBUMIN SERPL-MCNC: 3.6 G/DL (ref 3.5–5.2)
ALBUMIN/GLOB SERPL: 0.9 G/DL
ALP SERPL-CCNC: 88 U/L (ref 39–117)
ALT SERPL W P-5'-P-CCNC: 18 U/L (ref 1–33)
ANION GAP SERPL CALCULATED.3IONS-SCNC: 6.9 MMOL/L (ref 5–15)
AST SERPL-CCNC: 24 U/L (ref 1–32)
BASOPHILS # BLD AUTO: 0.05 10*3/MM3 (ref 0–0.2)
BASOPHILS NFR BLD AUTO: 0.3 % (ref 0–1.5)
BILIRUB SERPL-MCNC: 0.4 MG/DL (ref 0.2–1.2)
BILIRUB UR QL STRIP: NEGATIVE
BUN BLD-MCNC: 21 MG/DL (ref 8–23)
BUN/CREAT SERPL: 26.9 (ref 7–25)
CALCIUM SPEC-SCNC: 9.8 MG/DL (ref 8.6–10.5)
CHLORIDE SERPL-SCNC: 99 MMOL/L (ref 98–107)
CLARITY UR: CLEAR
CO2 SERPL-SCNC: 31.1 MMOL/L (ref 22–29)
COLOR UR: YELLOW
CREAT BLD-MCNC: 0.78 MG/DL (ref 0.57–1)
D-LACTATE SERPL-SCNC: 2.1 MMOL/L (ref 0.5–2)
DEPRECATED RDW RBC AUTO: 49 FL (ref 37–54)
EOSINOPHIL # BLD AUTO: 0.01 10*3/MM3 (ref 0–0.4)
EOSINOPHIL NFR BLD AUTO: 0.1 % (ref 0.3–6.2)
ERYTHROCYTE [DISTWIDTH] IN BLOOD BY AUTOMATED COUNT: 14.4 % (ref 12.3–15.4)
GFR SERPL CREATININE-BSD FRML MDRD: 75 ML/MIN/1.73
GLOBULIN UR ELPH-MCNC: 3.9 GM/DL
GLUCOSE BLD-MCNC: 142 MG/DL (ref 65–99)
GLUCOSE UR STRIP-MCNC: NEGATIVE MG/DL
HCT VFR BLD AUTO: 40.5 % (ref 34–46.6)
HGB BLD-MCNC: 12.7 G/DL (ref 12–15.9)
HGB UR QL STRIP.AUTO: NEGATIVE
HOLD SPECIMEN: NORMAL
HOLD SPECIMEN: NORMAL
IMM GRANULOCYTES # BLD AUTO: 0.07 10*3/MM3 (ref 0–0.05)
IMM GRANULOCYTES NFR BLD AUTO: 0.5 % (ref 0–0.5)
KETONES UR QL STRIP: ABNORMAL
LEUKOCYTE ESTERASE UR QL STRIP.AUTO: NEGATIVE
LYMPHOCYTES # BLD AUTO: 3.16 10*3/MM3 (ref 0.7–3.1)
LYMPHOCYTES NFR BLD AUTO: 21.7 % (ref 19.6–45.3)
MCH RBC QN AUTO: 28.9 PG (ref 26.6–33)
MCHC RBC AUTO-ENTMCNC: 31.4 G/DL (ref 31.5–35.7)
MCV RBC AUTO: 92.3 FL (ref 79–97)
MONOCYTES # BLD AUTO: 1.38 10*3/MM3 (ref 0.1–0.9)
MONOCYTES NFR BLD AUTO: 9.5 % (ref 5–12)
NEUTROPHILS # BLD AUTO: 9.88 10*3/MM3 (ref 1.7–7)
NEUTROPHILS NFR BLD AUTO: 67.9 % (ref 42.7–76)
NITRITE UR QL STRIP: NEGATIVE
NRBC BLD AUTO-RTO: 0 /100 WBC (ref 0–0.2)
PH UR STRIP.AUTO: <=5 [PH] (ref 4.5–8)
PLATELET # BLD AUTO: 204 10*3/MM3 (ref 140–450)
PMV BLD AUTO: 11.6 FL (ref 6–12)
POTASSIUM BLD-SCNC: 4.5 MMOL/L (ref 3.5–5.2)
PROCALCITONIN SERPL-MCNC: 0.05 NG/ML (ref 0.1–0.25)
PROT SERPL-MCNC: 7.5 G/DL (ref 6–8.5)
PROT UR QL STRIP: NEGATIVE
RBC # BLD AUTO: 4.39 10*6/MM3 (ref 3.77–5.28)
SODIUM BLD-SCNC: 137 MMOL/L (ref 136–145)
SP GR UR STRIP: 1.01 (ref 1–1.03)
UROBILINOGEN UR QL STRIP: ABNORMAL
WBC NRBC COR # BLD: 14.55 10*3/MM3 (ref 3.4–10.8)
WHOLE BLOOD HOLD SPECIMEN: NORMAL
WHOLE BLOOD HOLD SPECIMEN: NORMAL

## 2019-10-12 PROCEDURE — 87040 BLOOD CULTURE FOR BACTERIA: CPT | Performed by: EMERGENCY MEDICINE

## 2019-10-12 PROCEDURE — 83605 ASSAY OF LACTIC ACID: CPT | Performed by: EMERGENCY MEDICINE

## 2019-10-12 PROCEDURE — 25010000002 ONDANSETRON PER 1 MG: Performed by: EMERGENCY MEDICINE

## 2019-10-12 PROCEDURE — 80053 COMPREHEN METABOLIC PANEL: CPT | Performed by: EMERGENCY MEDICINE

## 2019-10-12 PROCEDURE — 73560 X-RAY EXAM OF KNEE 1 OR 2: CPT

## 2019-10-12 PROCEDURE — 85025 COMPLETE CBC W/AUTO DIFF WBC: CPT | Performed by: EMERGENCY MEDICINE

## 2019-10-12 PROCEDURE — 84145 PROCALCITONIN (PCT): CPT | Performed by: EMERGENCY MEDICINE

## 2019-10-12 PROCEDURE — 99285 EMERGENCY DEPT VISIT HI MDM: CPT

## 2019-10-12 PROCEDURE — 25010000002 MORPHINE PER 10 MG: Performed by: EMERGENCY MEDICINE

## 2019-10-12 PROCEDURE — 99284 EMERGENCY DEPT VISIT MOD MDM: CPT | Performed by: EMERGENCY MEDICINE

## 2019-10-12 PROCEDURE — 73552 X-RAY EXAM OF FEMUR 2/>: CPT

## 2019-10-12 PROCEDURE — 81003 URINALYSIS AUTO W/O SCOPE: CPT | Performed by: EMERGENCY MEDICINE

## 2019-10-12 PROCEDURE — 71045 X-RAY EXAM CHEST 1 VIEW: CPT

## 2019-10-12 RX ORDER — SODIUM CHLORIDE 9 MG/ML
INJECTION, SOLUTION INTRAVENOUS
Status: DISPENSED
Start: 2019-10-12 | End: 2019-10-13

## 2019-10-12 RX ORDER — MELOXICAM 15 MG/1
15 TABLET ORAL DAILY
COMMUNITY
End: 2019-10-17 | Stop reason: HOSPADM

## 2019-10-12 RX ORDER — DOXYCYCLINE HYCLATE 100 MG/1
100 CAPSULE ORAL 2 TIMES DAILY
COMMUNITY
End: 2019-10-17 | Stop reason: HOSPADM

## 2019-10-12 RX ORDER — CHLORAL HYDRATE 500 MG
1000 CAPSULE ORAL
COMMUNITY

## 2019-10-12 RX ORDER — NYSTATIN 100000 U/G
CREAM TOPICAL 2 TIMES DAILY
COMMUNITY

## 2019-10-12 RX ORDER — UREA 10 %
3 LOTION (ML) TOPICAL NIGHTLY
COMMUNITY

## 2019-10-12 RX ORDER — IBUPROFEN 600 MG/1
600 TABLET ORAL EVERY 8 HOURS PRN
COMMUNITY
End: 2019-10-17 | Stop reason: HOSPADM

## 2019-10-12 RX ORDER — ACETAMINOPHEN 500 MG
1000 TABLET ORAL ONCE
Status: COMPLETED | OUTPATIENT
Start: 2019-10-12 | End: 2019-10-12

## 2019-10-12 RX ORDER — NITROFURANTOIN 25; 75 MG/1; MG/1
100 CAPSULE ORAL 2 TIMES DAILY
COMMUNITY
End: 2019-10-17 | Stop reason: HOSPADM

## 2019-10-12 RX ORDER — GUAIFENESIN AND DEXTROMETHORPHAN HYDROBROMIDE 100; 10 MG/5ML; MG/5ML
5 SOLUTION ORAL 3 TIMES DAILY
COMMUNITY
End: 2019-10-17 | Stop reason: HOSPADM

## 2019-10-12 RX ORDER — VANCOMYCIN HYDROCHLORIDE 1 G/20ML
INJECTION, POWDER, LYOPHILIZED, FOR SOLUTION INTRAVENOUS
Status: DISPENSED
Start: 2019-10-12 | End: 2019-10-13

## 2019-10-12 RX ORDER — HYDROCODONE BITARTRATE AND ACETAMINOPHEN 7.5; 325 MG/1; MG/1
1 TABLET ORAL EVERY 6 HOURS PRN
COMMUNITY
End: 2019-10-17 | Stop reason: HOSPADM

## 2019-10-12 RX ORDER — CITALOPRAM 10 MG/1
30 TABLET ORAL DAILY
COMMUNITY

## 2019-10-12 RX ORDER — MIRTAZAPINE 30 MG/1
30 TABLET, FILM COATED ORAL NIGHTLY
COMMUNITY

## 2019-10-12 RX ORDER — FUROSEMIDE 40 MG/1
40 TABLET ORAL DAILY
COMMUNITY

## 2019-10-12 RX ORDER — ONDANSETRON 2 MG/ML
4 INJECTION INTRAMUSCULAR; INTRAVENOUS ONCE
Status: COMPLETED | OUTPATIENT
Start: 2019-10-12 | End: 2019-10-12

## 2019-10-12 RX ORDER — LISINOPRIL 5 MG/1
5 TABLET ORAL DAILY
COMMUNITY

## 2019-10-12 RX ORDER — SACCHAROMYCES BOULARDII 250 MG
250 CAPSULE ORAL 2 TIMES DAILY
COMMUNITY

## 2019-10-12 RX ADMIN — ONDANSETRON 4 MG: 2 INJECTION, SOLUTION INTRAMUSCULAR; INTRAVENOUS at 22:12

## 2019-10-12 RX ADMIN — MORPHINE SULFATE 4 MG: 4 INJECTION, SOLUTION INTRAMUSCULAR; INTRAVENOUS at 23:29

## 2019-10-12 RX ADMIN — ACETAMINOPHEN 1000 MG: 500 TABLET, FILM COATED ORAL at 23:29

## 2019-10-12 RX ADMIN — MORPHINE SULFATE 4 MG: 4 INJECTION, SOLUTION INTRAMUSCULAR; INTRAVENOUS at 22:12

## 2019-10-13 ENCOUNTER — PREP FOR SURGERY (OUTPATIENT)
Dept: OTHER | Facility: HOSPITAL | Age: 59
End: 2019-10-13

## 2019-10-13 ENCOUNTER — APPOINTMENT (OUTPATIENT)
Dept: CT IMAGING | Facility: HOSPITAL | Age: 59
End: 2019-10-13

## 2019-10-13 DIAGNOSIS — S72.455A: Primary | ICD-10-CM

## 2019-10-13 PROBLEM — S72.402A CLOSED FRACTURE OF LEFT DISTAL FEMUR (HCC): Status: ACTIVE | Noted: 2019-10-13

## 2019-10-13 LAB
D-LACTATE SERPL-SCNC: 1.2 MMOL/L (ref 0.5–2)
DEPRECATED RDW RBC AUTO: 51.5 FL (ref 37–54)
ERYTHROCYTE [DISTWIDTH] IN BLOOD BY AUTOMATED COUNT: 14.6 % (ref 12.3–15.4)
HBA1C MFR BLD: 6 % (ref 4.8–5.6)
HCT VFR BLD AUTO: 39.3 % (ref 34–46.6)
HGB BLD-MCNC: 12.1 G/DL (ref 12–15.9)
HOLD SPECIMEN: NORMAL
MCH RBC QN AUTO: 29.2 PG (ref 26.6–33)
MCHC RBC AUTO-ENTMCNC: 30.8 G/DL (ref 31.5–35.7)
MCV RBC AUTO: 94.7 FL (ref 79–97)
PLATELET # BLD AUTO: 210 10*3/MM3 (ref 140–450)
PMV BLD AUTO: 12.5 FL (ref 6–12)
RBC # BLD AUTO: 4.15 10*6/MM3 (ref 3.77–5.28)
WBC NRBC COR # BLD: 13.53 10*3/MM3 (ref 3.4–10.8)

## 2019-10-13 PROCEDURE — 73700 CT LOWER EXTREMITY W/O DYE: CPT

## 2019-10-13 PROCEDURE — 83036 HEMOGLOBIN GLYCOSYLATED A1C: CPT | Performed by: FAMILY MEDICINE

## 2019-10-13 PROCEDURE — 94799 UNLISTED PULMONARY SVC/PX: CPT

## 2019-10-13 PROCEDURE — 25010000002 VANCOMYCIN 1 G RECONSTITUTED SOLUTION 1 EACH VIAL: Performed by: EMERGENCY MEDICINE

## 2019-10-13 PROCEDURE — 85027 COMPLETE CBC AUTOMATED: CPT | Performed by: FAMILY MEDICINE

## 2019-10-13 PROCEDURE — 99222 1ST HOSP IP/OBS MODERATE 55: CPT | Performed by: FAMILY MEDICINE

## 2019-10-13 PROCEDURE — 94640 AIRWAY INHALATION TREATMENT: CPT

## 2019-10-13 PROCEDURE — 94770: CPT

## 2019-10-13 PROCEDURE — 99222 1ST HOSP IP/OBS MODERATE 55: CPT | Performed by: ORTHOPAEDIC SURGERY

## 2019-10-13 PROCEDURE — 93005 ELECTROCARDIOGRAM TRACING: CPT | Performed by: EMERGENCY MEDICINE

## 2019-10-13 PROCEDURE — 25010000002 VANCOMYCIN 1 G RECONSTITUTED SOLUTION 1 EACH VIAL: Performed by: FAMILY MEDICINE

## 2019-10-13 PROCEDURE — 93010 ELECTROCARDIOGRAM REPORT: CPT | Performed by: INTERNAL MEDICINE

## 2019-10-13 PROCEDURE — 83605 ASSAY OF LACTIC ACID: CPT | Performed by: EMERGENCY MEDICINE

## 2019-10-13 PROCEDURE — 25010000002 VANCOMYCIN PER 500 MG: Performed by: FAMILY MEDICINE

## 2019-10-13 PROCEDURE — 25010000002 ENOXAPARIN PER 10 MG: Performed by: FAMILY MEDICINE

## 2019-10-13 PROCEDURE — 25010000002 HYDROMORPHONE PER 4 MG: Performed by: ORTHOPAEDIC SURGERY

## 2019-10-13 RX ORDER — BUDESONIDE 0.5 MG/2ML
0.5 INHALANT ORAL
Status: DISCONTINUED | OUTPATIENT
Start: 2019-10-13 | End: 2019-10-17 | Stop reason: HOSPADM

## 2019-10-13 RX ORDER — LANOLIN ALCOHOL/MO/W.PET/CERES
3 CREAM (GRAM) TOPICAL NIGHTLY
Status: DISCONTINUED | OUTPATIENT
Start: 2019-10-13 | End: 2019-10-17 | Stop reason: HOSPADM

## 2019-10-13 RX ORDER — IPRATROPIUM BROMIDE AND ALBUTEROL SULFATE 2.5; .5 MG/3ML; MG/3ML
3 SOLUTION RESPIRATORY (INHALATION)
Status: DISCONTINUED | OUTPATIENT
Start: 2019-10-13 | End: 2019-10-17 | Stop reason: HOSPADM

## 2019-10-13 RX ORDER — ONDANSETRON 2 MG/ML
4 INJECTION INTRAMUSCULAR; INTRAVENOUS EVERY 8 HOURS PRN
Status: DISCONTINUED | OUTPATIENT
Start: 2019-10-13 | End: 2019-10-15

## 2019-10-13 RX ORDER — SODIUM CHLORIDE, SODIUM LACTATE, POTASSIUM CHLORIDE, CALCIUM CHLORIDE 600; 310; 30; 20 MG/100ML; MG/100ML; MG/100ML; MG/100ML
75 INJECTION, SOLUTION INTRAVENOUS CONTINUOUS
Status: DISCONTINUED | OUTPATIENT
Start: 2019-10-13 | End: 2019-10-15

## 2019-10-13 RX ORDER — PANTOPRAZOLE SODIUM 40 MG/1
40 TABLET, DELAYED RELEASE ORAL EVERY MORNING
Status: DISCONTINUED | OUTPATIENT
Start: 2019-10-13 | End: 2019-10-17 | Stop reason: HOSPADM

## 2019-10-13 RX ORDER — ACETAMINOPHEN 325 MG/1
650 TABLET ORAL EVERY 6 HOURS PRN
COMMUNITY
End: 2019-10-17 | Stop reason: HOSPADM

## 2019-10-13 RX ORDER — IPRATROPIUM BROMIDE AND ALBUTEROL SULFATE 2.5; .5 MG/3ML; MG/3ML
SOLUTION RESPIRATORY (INHALATION)
Status: COMPLETED
Start: 2019-10-13 | End: 2019-10-13

## 2019-10-13 RX ORDER — NITROGLYCERIN 0.4 MG/1
0.4 TABLET SUBLINGUAL
Status: DISCONTINUED | OUTPATIENT
Start: 2019-10-13 | End: 2019-10-17 | Stop reason: HOSPADM

## 2019-10-13 RX ORDER — LORAZEPAM 0.5 MG/1
0.5 TABLET ORAL EVERY 8 HOURS PRN
Status: DISCONTINUED | OUTPATIENT
Start: 2019-10-13 | End: 2019-10-17 | Stop reason: HOSPADM

## 2019-10-13 RX ORDER — HYDROCODONE BITARTRATE AND ACETAMINOPHEN 7.5; 325 MG/1; MG/1
1 TABLET ORAL EVERY 4 HOURS PRN
Status: DISCONTINUED | OUTPATIENT
Start: 2019-10-13 | End: 2019-10-17

## 2019-10-13 RX ORDER — LANOLIN ALCOHOL/MO/W.PET/CERES
3 CREAM (GRAM) TOPICAL NIGHTLY
Status: DISCONTINUED | OUTPATIENT
Start: 2019-10-13 | End: 2019-10-13 | Stop reason: CLARIF

## 2019-10-13 RX ORDER — SACCHAROMYCES BOULARDII 250 MG
250 CAPSULE ORAL 2 TIMES DAILY
Status: DISCONTINUED | OUTPATIENT
Start: 2019-10-13 | End: 2019-10-17 | Stop reason: HOSPADM

## 2019-10-13 RX ORDER — FUROSEMIDE 40 MG/1
40 TABLET ORAL DAILY
Status: DISCONTINUED | OUTPATIENT
Start: 2019-10-13 | End: 2019-10-14

## 2019-10-13 RX ORDER — OXYCODONE HCL 10 MG/1
10 TABLET, FILM COATED, EXTENDED RELEASE ORAL ONCE
Status: CANCELLED | OUTPATIENT
Start: 2019-10-13 | End: 2019-10-13

## 2019-10-13 RX ORDER — ACETAMINOPHEN 500 MG
1000 TABLET ORAL EVERY 6 HOURS PRN
Status: DISCONTINUED | OUTPATIENT
Start: 2019-10-13 | End: 2019-10-17 | Stop reason: HOSPADM

## 2019-10-13 RX ORDER — OXYCODONE HYDROCHLORIDE AND ACETAMINOPHEN 5; 325 MG/1; MG/1
1 TABLET ORAL EVERY 8 HOURS
COMMUNITY
End: 2019-10-17 | Stop reason: HOSPADM

## 2019-10-13 RX ORDER — PREGABALIN 75 MG/1
150 CAPSULE ORAL ONCE
Status: CANCELLED | OUTPATIENT
Start: 2019-10-13 | End: 2019-10-13

## 2019-10-13 RX ORDER — MIRTAZAPINE 15 MG/1
30 TABLET, FILM COATED ORAL NIGHTLY
Status: DISCONTINUED | OUTPATIENT
Start: 2019-10-13 | End: 2019-10-17 | Stop reason: HOSPADM

## 2019-10-13 RX ORDER — HYDROMORPHONE HYDROCHLORIDE 1 MG/ML
1 INJECTION, SOLUTION INTRAMUSCULAR; INTRAVENOUS; SUBCUTANEOUS
Status: DISCONTINUED | OUTPATIENT
Start: 2019-10-13 | End: 2019-10-17

## 2019-10-13 RX ORDER — BUSPIRONE HYDROCHLORIDE 5 MG/1
5 TABLET ORAL 3 TIMES DAILY
Status: DISCONTINUED | OUTPATIENT
Start: 2019-10-13 | End: 2019-10-17 | Stop reason: HOSPADM

## 2019-10-13 RX ORDER — GABAPENTIN 100 MG/1
200 CAPSULE ORAL EVERY 12 HOURS SCHEDULED
Status: DISCONTINUED | OUTPATIENT
Start: 2019-10-13 | End: 2019-10-14

## 2019-10-13 RX ORDER — ARFORMOTEROL TARTRATE 15 UG/2ML
15 SOLUTION RESPIRATORY (INHALATION)
Status: DISCONTINUED | OUTPATIENT
Start: 2019-10-13 | End: 2019-10-17 | Stop reason: HOSPADM

## 2019-10-13 RX ORDER — LISINOPRIL 5 MG/1
5 TABLET ORAL DAILY
Status: DISCONTINUED | OUTPATIENT
Start: 2019-10-13 | End: 2019-10-17 | Stop reason: HOSPADM

## 2019-10-13 RX ADMIN — METFORMIN HYDROCHLORIDE 500 MG: 500 TABLET ORAL at 17:10

## 2019-10-13 RX ADMIN — ARFORMOTEROL TARTRATE 15 MCG: 15 SOLUTION RESPIRATORY (INHALATION) at 20:13

## 2019-10-13 RX ADMIN — HYDROMORPHONE HYDROCHLORIDE 1 MG: 1 INJECTION, SOLUTION INTRAMUSCULAR; INTRAVENOUS; SUBCUTANEOUS at 08:56

## 2019-10-13 RX ADMIN — BUSPIRONE HYDROCHLORIDE 5 MG: 5 TABLET ORAL at 17:10

## 2019-10-13 RX ADMIN — VANCOMYCIN HYDROCHLORIDE 2000 MG: 1 INJECTION, POWDER, LYOPHILIZED, FOR SOLUTION INTRAVENOUS at 00:06

## 2019-10-13 RX ADMIN — IPRATROPIUM BROMIDE AND ALBUTEROL SULFATE 3 ML: .5; 3 SOLUTION RESPIRATORY (INHALATION) at 11:14

## 2019-10-13 RX ADMIN — BUSPIRONE HYDROCHLORIDE 5 MG: 5 TABLET ORAL at 20:54

## 2019-10-13 RX ADMIN — VANCOMYCIN HYDROCHLORIDE 1500 MG: 500 INJECTION, POWDER, LYOPHILIZED, FOR SOLUTION INTRAVENOUS at 23:23

## 2019-10-13 RX ADMIN — Medication 3 MG: at 20:53

## 2019-10-13 RX ADMIN — HYDROMORPHONE HYDROCHLORIDE 1 MG: 1 INJECTION, SOLUTION INTRAMUSCULAR; INTRAVENOUS; SUBCUTANEOUS at 22:47

## 2019-10-13 RX ADMIN — IPRATROPIUM BROMIDE AND ALBUTEROL SULFATE 3 ML: .5; 3 SOLUTION RESPIRATORY (INHALATION) at 07:42

## 2019-10-13 RX ADMIN — HYDROCODONE BITARTRATE AND ACETAMINOPHEN 1 TABLET: 7.5; 325 TABLET ORAL at 20:16

## 2019-10-13 RX ADMIN — METFORMIN HYDROCHLORIDE 500 MG: 500 TABLET ORAL at 10:26

## 2019-10-13 RX ADMIN — Medication 250 MG: at 20:52

## 2019-10-13 RX ADMIN — CITALOPRAM HYDROBROMIDE 30 MG: 20 TABLET ORAL at 10:24

## 2019-10-13 RX ADMIN — MIRTAZAPINE 30 MG: 15 TABLET, FILM COATED ORAL at 20:49

## 2019-10-13 RX ADMIN — HYDROCODONE BITARTRATE AND ACETAMINOPHEN 1 TABLET: 7.5; 325 TABLET ORAL at 10:34

## 2019-10-13 RX ADMIN — PANTOPRAZOLE SODIUM 40 MG: 40 TABLET, DELAYED RELEASE ORAL at 10:27

## 2019-10-13 RX ADMIN — ARFORMOTEROL TARTRATE 15 MCG: 15 SOLUTION RESPIRATORY (INHALATION) at 07:52

## 2019-10-13 RX ADMIN — GABAPENTIN 200 MG: 100 CAPSULE ORAL at 10:34

## 2019-10-13 RX ADMIN — HYDROMORPHONE HYDROCHLORIDE 1 MG: 1 INJECTION, SOLUTION INTRAMUSCULAR; INTRAVENOUS; SUBCUTANEOUS at 01:05

## 2019-10-13 RX ADMIN — SODIUM CHLORIDE, POTASSIUM CHLORIDE, SODIUM LACTATE AND CALCIUM CHLORIDE 75 ML/HR: 600; 310; 30; 20 INJECTION, SOLUTION INTRAVENOUS at 08:49

## 2019-10-13 RX ADMIN — BUDESONIDE 0.5 MG: 0.5 SUSPENSION RESPIRATORY (INHALATION) at 20:13

## 2019-10-13 RX ADMIN — GABAPENTIN 200 MG: 100 CAPSULE ORAL at 20:49

## 2019-10-13 RX ADMIN — Medication 250 MG: at 10:27

## 2019-10-13 RX ADMIN — BUDESONIDE 0.5 MG: 0.5 SUSPENSION RESPIRATORY (INHALATION) at 07:43

## 2019-10-13 RX ADMIN — VANCOMYCIN HYDROCHLORIDE 1500 MG: 500 INJECTION, POWDER, LYOPHILIZED, FOR SOLUTION INTRAVENOUS at 13:50

## 2019-10-13 RX ADMIN — BUSPIRONE HYDROCHLORIDE 5 MG: 5 TABLET ORAL at 10:24

## 2019-10-13 RX ADMIN — HYDROMORPHONE HYDROCHLORIDE 1 MG: 1 INJECTION, SOLUTION INTRAMUSCULAR; INTRAVENOUS; SUBCUTANEOUS at 18:22

## 2019-10-13 RX ADMIN — ENOXAPARIN SODIUM 90 MG: 100 INJECTION SUBCUTANEOUS at 13:49

## 2019-10-13 RX ADMIN — IPRATROPIUM BROMIDE AND ALBUTEROL SULFATE 3 ML: .5; 3 SOLUTION RESPIRATORY (INHALATION) at 15:45

## 2019-10-13 RX ADMIN — HYDROCODONE BITARTRATE AND ACETAMINOPHEN 1 TABLET: 7.5; 325 TABLET ORAL at 16:09

## 2019-10-13 RX ADMIN — LORAZEPAM 0.5 MG: 0.5 TABLET ORAL at 18:38

## 2019-10-13 RX ADMIN — FUROSEMIDE 40 MG: 40 TABLET ORAL at 10:25

## 2019-10-13 RX ADMIN — IPRATROPIUM BROMIDE AND ALBUTEROL SULFATE 3 ML: .5; 3 SOLUTION RESPIRATORY (INHALATION) at 20:12

## 2019-10-13 RX ADMIN — LISINOPRIL 5 MG: 5 TABLET ORAL at 10:26

## 2019-10-14 ENCOUNTER — APPOINTMENT (OUTPATIENT)
Dept: GENERAL RADIOLOGY | Facility: HOSPITAL | Age: 59
End: 2019-10-14

## 2019-10-14 ENCOUNTER — ANESTHESIA EVENT (OUTPATIENT)
Dept: PERIOP | Facility: HOSPITAL | Age: 59
End: 2019-10-14

## 2019-10-14 PROBLEM — S72.455A: Status: ACTIVE | Noted: 2019-10-12

## 2019-10-14 LAB
ANION GAP SERPL CALCULATED.3IONS-SCNC: 6.7 MMOL/L (ref 5–15)
B PARAPERT DNA SPEC QL NAA+PROBE: NOT DETECTED
B PERT DNA SPEC QL NAA+PROBE: NOT DETECTED
BASOPHILS # BLD AUTO: 0.05 10*3/MM3 (ref 0–0.2)
BASOPHILS NFR BLD AUTO: 0.5 % (ref 0–1.5)
BUN BLD-MCNC: 17 MG/DL (ref 8–23)
BUN/CREAT SERPL: 21.3 (ref 7–25)
C PNEUM DNA NPH QL NAA+NON-PROBE: NOT DETECTED
CALCIUM SPEC-SCNC: 8.7 MG/DL (ref 8.6–10.5)
CHLORIDE SERPL-SCNC: 102 MMOL/L (ref 98–107)
CO2 SERPL-SCNC: 29.3 MMOL/L (ref 22–29)
CREAT BLD-MCNC: 0.8 MG/DL (ref 0.57–1)
DEPRECATED RDW RBC AUTO: 51.7 FL (ref 37–54)
EOSINOPHIL # BLD AUTO: 0.08 10*3/MM3 (ref 0–0.4)
EOSINOPHIL NFR BLD AUTO: 0.9 % (ref 0.3–6.2)
ERYTHROCYTE [DISTWIDTH] IN BLOOD BY AUTOMATED COUNT: 14.6 % (ref 12.3–15.4)
FLUAV H1 2009 PAND RNA NPH QL NAA+PROBE: NOT DETECTED
FLUAV H1 HA GENE NPH QL NAA+PROBE: NOT DETECTED
FLUAV H3 RNA NPH QL NAA+PROBE: NOT DETECTED
FLUAV SUBTYP SPEC NAA+PROBE: NOT DETECTED
FLUBV RNA ISLT QL NAA+PROBE: NOT DETECTED
GFR SERPL CREATININE-BSD FRML MDRD: 73 ML/MIN/1.73
GLUCOSE BLD-MCNC: 149 MG/DL (ref 65–99)
GLUCOSE BLDC GLUCOMTR-MCNC: 164 MG/DL (ref 70–130)
HADV DNA SPEC NAA+PROBE: NOT DETECTED
HCOV 229E RNA SPEC QL NAA+PROBE: NOT DETECTED
HCOV HKU1 RNA SPEC QL NAA+PROBE: NOT DETECTED
HCOV NL63 RNA SPEC QL NAA+PROBE: NOT DETECTED
HCOV OC43 RNA SPEC QL NAA+PROBE: NOT DETECTED
HCT VFR BLD AUTO: 35.1 % (ref 34–46.6)
HGB BLD-MCNC: 10.5 G/DL (ref 12–15.9)
HMPV RNA NPH QL NAA+NON-PROBE: NOT DETECTED
HPIV1 RNA SPEC QL NAA+PROBE: DETECTED
HPIV2 RNA SPEC QL NAA+PROBE: NOT DETECTED
HPIV3 RNA NPH QL NAA+PROBE: NOT DETECTED
HPIV4 P GENE NPH QL NAA+PROBE: NOT DETECTED
IMM GRANULOCYTES # BLD AUTO: 0.03 10*3/MM3 (ref 0–0.05)
IMM GRANULOCYTES NFR BLD AUTO: 0.3 % (ref 0–0.5)
LYMPHOCYTES # BLD AUTO: 2.05 10*3/MM3 (ref 0.7–3.1)
LYMPHOCYTES NFR BLD AUTO: 22.4 % (ref 19.6–45.3)
M PNEUMO IGG SER IA-ACNC: NOT DETECTED
MCH RBC QN AUTO: 28.7 PG (ref 26.6–33)
MCHC RBC AUTO-ENTMCNC: 29.9 G/DL (ref 31.5–35.7)
MCV RBC AUTO: 95.9 FL (ref 79–97)
MONOCYTES # BLD AUTO: 1.19 10*3/MM3 (ref 0.1–0.9)
MONOCYTES NFR BLD AUTO: 13 % (ref 5–12)
NEUTROPHILS # BLD AUTO: 5.74 10*3/MM3 (ref 1.7–7)
NEUTROPHILS NFR BLD AUTO: 62.9 % (ref 42.7–76)
NRBC BLD AUTO-RTO: 0 /100 WBC (ref 0–0.2)
PLATELET # BLD AUTO: 139 10*3/MM3 (ref 140–450)
PMV BLD AUTO: 11.2 FL (ref 6–12)
POTASSIUM BLD-SCNC: 4.3 MMOL/L (ref 3.5–5.2)
PROCALCITONIN SERPL-MCNC: 0.08 NG/ML (ref 0.1–0.25)
RBC # BLD AUTO: 3.66 10*6/MM3 (ref 3.77–5.28)
RHINOVIRUS RNA SPEC NAA+PROBE: NOT DETECTED
RSV RNA NPH QL NAA+NON-PROBE: NOT DETECTED
SODIUM BLD-SCNC: 138 MMOL/L (ref 136–145)
TSH SERPL DL<=0.05 MIU/L-ACNC: 0.93 UIU/ML (ref 0.27–4.2)
VANCOMYCIN SERPL-MCNC: 25.7 MCG/ML (ref 5–40)
WBC NRBC COR # BLD: 9.14 10*3/MM3 (ref 3.4–10.8)

## 2019-10-14 PROCEDURE — 25010000002 HYDROMORPHONE PER 4 MG: Performed by: ORTHOPAEDIC SURGERY

## 2019-10-14 PROCEDURE — 94799 UNLISTED PULMONARY SVC/PX: CPT

## 2019-10-14 PROCEDURE — 99231 SBSQ HOSP IP/OBS SF/LOW 25: CPT | Performed by: ORTHOPAEDIC SURGERY

## 2019-10-14 PROCEDURE — 71045 X-RAY EXAM CHEST 1 VIEW: CPT

## 2019-10-14 PROCEDURE — 0100U HC BIOFIRE FILMARRAY RESP PANEL 2: CPT | Performed by: NURSE PRACTITIONER

## 2019-10-14 PROCEDURE — 84443 ASSAY THYROID STIM HORMONE: CPT | Performed by: NURSE PRACTITIONER

## 2019-10-14 PROCEDURE — 80202 ASSAY OF VANCOMYCIN: CPT | Performed by: FAMILY MEDICINE

## 2019-10-14 PROCEDURE — 80048 BASIC METABOLIC PNL TOTAL CA: CPT | Performed by: HOSPITALIST

## 2019-10-14 PROCEDURE — 85025 COMPLETE CBC W/AUTO DIFF WBC: CPT | Performed by: HOSPITALIST

## 2019-10-14 PROCEDURE — 84145 PROCALCITONIN (PCT): CPT | Performed by: NURSE PRACTITIONER

## 2019-10-14 PROCEDURE — 82962 GLUCOSE BLOOD TEST: CPT

## 2019-10-14 PROCEDURE — 25010000002 ENOXAPARIN PER 10 MG: Performed by: FAMILY MEDICINE

## 2019-10-14 PROCEDURE — 99232 SBSQ HOSP IP/OBS MODERATE 35: CPT | Performed by: NURSE PRACTITIONER

## 2019-10-14 RX ORDER — DOCUSATE SODIUM 100 MG/1
100 CAPSULE, LIQUID FILLED ORAL 2 TIMES DAILY
Status: DISCONTINUED | OUTPATIENT
Start: 2019-10-14 | End: 2019-10-17 | Stop reason: HOSPADM

## 2019-10-14 RX ORDER — BENZONATATE 100 MG/1
200 CAPSULE ORAL 3 TIMES DAILY PRN
Status: DISCONTINUED | OUTPATIENT
Start: 2019-10-14 | End: 2019-10-17 | Stop reason: HOSPADM

## 2019-10-14 RX ORDER — GABAPENTIN 300 MG/1
300 CAPSULE ORAL 3 TIMES DAILY
Status: DISCONTINUED | OUTPATIENT
Start: 2019-10-14 | End: 2019-10-17 | Stop reason: HOSPADM

## 2019-10-14 RX ORDER — DOXYCYCLINE 100 MG/1
100 CAPSULE ORAL EVERY 12 HOURS SCHEDULED
Status: DISCONTINUED | OUTPATIENT
Start: 2019-10-14 | End: 2019-10-17 | Stop reason: HOSPADM

## 2019-10-14 RX ORDER — NICOTINE 21 MG/24HR
1 PATCH, TRANSDERMAL 24 HOURS TRANSDERMAL
Status: DISCONTINUED | OUTPATIENT
Start: 2019-10-14 | End: 2019-10-17 | Stop reason: HOSPADM

## 2019-10-14 RX ORDER — IPRATROPIUM BROMIDE AND ALBUTEROL SULFATE 2.5; .5 MG/3ML; MG/3ML
3 SOLUTION RESPIRATORY (INHALATION) EVERY 4 HOURS PRN
Status: DISCONTINUED | OUTPATIENT
Start: 2019-10-14 | End: 2019-10-17 | Stop reason: HOSPADM

## 2019-10-14 RX ADMIN — POLYETHYLENE GLYCOL 3350 17 G: 17 POWDER, FOR SOLUTION ORAL at 15:11

## 2019-10-14 RX ADMIN — BUDESONIDE 0.5 MG: 0.5 SUSPENSION RESPIRATORY (INHALATION) at 19:19

## 2019-10-14 RX ADMIN — IPRATROPIUM BROMIDE AND ALBUTEROL SULFATE 3 ML: .5; 3 SOLUTION RESPIRATORY (INHALATION) at 19:19

## 2019-10-14 RX ADMIN — IPRATROPIUM BROMIDE AND ALBUTEROL SULFATE 3 ML: .5; 3 SOLUTION RESPIRATORY (INHALATION) at 15:49

## 2019-10-14 RX ADMIN — ARFORMOTEROL TARTRATE 15 MCG: 15 SOLUTION RESPIRATORY (INHALATION) at 19:52

## 2019-10-14 RX ADMIN — Medication 3 MG: at 20:41

## 2019-10-14 RX ADMIN — ARFORMOTEROL TARTRATE 15 MCG: 15 SOLUTION RESPIRATORY (INHALATION) at 09:11

## 2019-10-14 RX ADMIN — PANTOPRAZOLE SODIUM 40 MG: 40 TABLET, DELAYED RELEASE ORAL at 06:00

## 2019-10-14 RX ADMIN — LISINOPRIL 5 MG: 5 TABLET ORAL at 10:06

## 2019-10-14 RX ADMIN — DOXYCYCLINE 100 MG: 100 CAPSULE ORAL at 20:41

## 2019-10-14 RX ADMIN — LORAZEPAM 0.5 MG: 0.5 TABLET ORAL at 23:55

## 2019-10-14 RX ADMIN — METFORMIN HYDROCHLORIDE 500 MG: 500 TABLET ORAL at 10:01

## 2019-10-14 RX ADMIN — HYDROCODONE BITARTRATE AND ACETAMINOPHEN 1 TABLET: 7.5; 325 TABLET ORAL at 04:02

## 2019-10-14 RX ADMIN — MIRTAZAPINE 30 MG: 15 TABLET, FILM COATED ORAL at 20:39

## 2019-10-14 RX ADMIN — Medication 250 MG: at 10:01

## 2019-10-14 RX ADMIN — SODIUM CHLORIDE, POTASSIUM CHLORIDE, SODIUM LACTATE AND CALCIUM CHLORIDE 75 ML/HR: 600; 310; 30; 20 INJECTION, SOLUTION INTRAVENOUS at 05:41

## 2019-10-14 RX ADMIN — Medication 250 MG: at 20:42

## 2019-10-14 RX ADMIN — FUROSEMIDE 40 MG: 40 TABLET ORAL at 10:05

## 2019-10-14 RX ADMIN — BUDESONIDE 0.5 MG: 0.5 SUSPENSION RESPIRATORY (INHALATION) at 09:11

## 2019-10-14 RX ADMIN — HYDROCODONE BITARTRATE AND ACETAMINOPHEN 1 TABLET: 7.5; 325 TABLET ORAL at 13:07

## 2019-10-14 RX ADMIN — GABAPENTIN 200 MG: 100 CAPSULE ORAL at 09:59

## 2019-10-14 RX ADMIN — DOCUSATE SODIUM 100 MG: 100 CAPSULE, LIQUID FILLED ORAL at 20:40

## 2019-10-14 RX ADMIN — HYDROMORPHONE HYDROCHLORIDE 1 MG: 1 INJECTION, SOLUTION INTRAMUSCULAR; INTRAVENOUS; SUBCUTANEOUS at 09:57

## 2019-10-14 RX ADMIN — SODIUM CHLORIDE, POTASSIUM CHLORIDE, SODIUM LACTATE AND CALCIUM CHLORIDE 75 ML/HR: 600; 310; 30; 20 INJECTION, SOLUTION INTRAVENOUS at 22:40

## 2019-10-14 RX ADMIN — CITALOPRAM HYDROBROMIDE 30 MG: 20 TABLET ORAL at 10:00

## 2019-10-14 RX ADMIN — METFORMIN HYDROCHLORIDE 500 MG: 500 TABLET ORAL at 18:48

## 2019-10-14 RX ADMIN — BUSPIRONE HYDROCHLORIDE 5 MG: 5 TABLET ORAL at 15:12

## 2019-10-14 RX ADMIN — HYDROMORPHONE HYDROCHLORIDE 1 MG: 1 INJECTION, SOLUTION INTRAMUSCULAR; INTRAVENOUS; SUBCUTANEOUS at 05:31

## 2019-10-14 RX ADMIN — BUSPIRONE HYDROCHLORIDE 5 MG: 5 TABLET ORAL at 20:42

## 2019-10-14 RX ADMIN — ENOXAPARIN SODIUM 90 MG: 100 INJECTION SUBCUTANEOUS at 20:43

## 2019-10-14 RX ADMIN — GABAPENTIN 300 MG: 300 CAPSULE ORAL at 20:40

## 2019-10-14 RX ADMIN — HYDROCODONE BITARTRATE AND ACETAMINOPHEN 1 TABLET: 7.5; 325 TABLET ORAL at 18:48

## 2019-10-14 RX ADMIN — IPRATROPIUM BROMIDE AND ALBUTEROL SULFATE 3 ML: .5; 3 SOLUTION RESPIRATORY (INHALATION) at 11:46

## 2019-10-14 RX ADMIN — LORAZEPAM 0.5 MG: 0.5 TABLET ORAL at 16:51

## 2019-10-14 RX ADMIN — NICOTINE 1 PATCH: 14 PATCH TRANSDERMAL at 16:51

## 2019-10-14 RX ADMIN — ENOXAPARIN SODIUM 90 MG: 100 INJECTION SUBCUTANEOUS at 13:06

## 2019-10-14 RX ADMIN — BUSPIRONE HYDROCHLORIDE 5 MG: 5 TABLET ORAL at 10:00

## 2019-10-14 RX ADMIN — BENZONATATE 200 MG: 100 CAPSULE ORAL at 22:39

## 2019-10-14 RX ADMIN — SODIUM CHLORIDE, POTASSIUM CHLORIDE, SODIUM LACTATE AND CALCIUM CHLORIDE 75 ML/HR: 600; 310; 30; 20 INJECTION, SOLUTION INTRAVENOUS at 09:04

## 2019-10-14 RX ADMIN — HYDROCODONE BITARTRATE AND ACETAMINOPHEN 1 TABLET: 7.5; 325 TABLET ORAL at 23:55

## 2019-10-14 RX ADMIN — HYDROCODONE BITARTRATE AND ACETAMINOPHEN 1 TABLET: 7.5; 325 TABLET ORAL at 08:24

## 2019-10-14 RX ADMIN — IPRATROPIUM BROMIDE AND ALBUTEROL SULFATE 3 ML: .5; 3 SOLUTION RESPIRATORY (INHALATION) at 07:08

## 2019-10-14 RX ADMIN — GABAPENTIN 300 MG: 300 CAPSULE ORAL at 15:10

## 2019-10-15 ENCOUNTER — ANESTHESIA (OUTPATIENT)
Dept: PERIOP | Facility: HOSPITAL | Age: 59
End: 2019-10-15

## 2019-10-15 ENCOUNTER — APPOINTMENT (OUTPATIENT)
Dept: GENERAL RADIOLOGY | Facility: HOSPITAL | Age: 59
End: 2019-10-15

## 2019-10-15 LAB
ANION GAP SERPL CALCULATED.3IONS-SCNC: 6.2 MMOL/L (ref 5–15)
BASOPHILS # BLD AUTO: 0.04 10*3/MM3 (ref 0–0.2)
BASOPHILS NFR BLD AUTO: 0.5 % (ref 0–1.5)
BUN BLD-MCNC: 9 MG/DL (ref 8–23)
BUN/CREAT SERPL: 15.3 (ref 7–25)
CALCIUM SPEC-SCNC: 8.8 MG/DL (ref 8.6–10.5)
CHLORIDE SERPL-SCNC: 96 MMOL/L (ref 98–107)
CO2 SERPL-SCNC: 34.8 MMOL/L (ref 22–29)
CREAT BLD-MCNC: 0.59 MG/DL (ref 0.57–1)
DEPRECATED RDW RBC AUTO: 50.3 FL (ref 37–54)
EOSINOPHIL # BLD AUTO: 0.07 10*3/MM3 (ref 0–0.4)
EOSINOPHIL NFR BLD AUTO: 0.8 % (ref 0.3–6.2)
ERYTHROCYTE [DISTWIDTH] IN BLOOD BY AUTOMATED COUNT: 14.5 % (ref 12.3–15.4)
GFR SERPL CREATININE-BSD FRML MDRD: 104 ML/MIN/1.73
GLUCOSE BLD-MCNC: 127 MG/DL (ref 65–99)
GLUCOSE BLDC GLUCOMTR-MCNC: 120 MG/DL (ref 70–130)
GLUCOSE BLDC GLUCOMTR-MCNC: 126 MG/DL (ref 70–130)
GLUCOSE BLDC GLUCOMTR-MCNC: 151 MG/DL (ref 70–130)
HCT VFR BLD AUTO: 33.5 % (ref 34–46.6)
HCT VFR BLD AUTO: 34.8 % (ref 34–46.6)
HGB BLD-MCNC: 10.8 G/DL (ref 12–15.9)
HGB BLD-MCNC: 10.8 G/DL (ref 12–15.9)
IMM GRANULOCYTES # BLD AUTO: 0.03 10*3/MM3 (ref 0–0.05)
IMM GRANULOCYTES NFR BLD AUTO: 0.4 % (ref 0–0.5)
LYMPHOCYTES # BLD AUTO: 2.33 10*3/MM3 (ref 0.7–3.1)
LYMPHOCYTES NFR BLD AUTO: 28 % (ref 19.6–45.3)
MCH RBC QN AUTO: 29.5 PG (ref 26.6–33)
MCHC RBC AUTO-ENTMCNC: 31 G/DL (ref 31.5–35.7)
MCV RBC AUTO: 95.1 FL (ref 79–97)
MONOCYTES # BLD AUTO: 1.09 10*3/MM3 (ref 0.1–0.9)
MONOCYTES NFR BLD AUTO: 13.1 % (ref 5–12)
NEUTROPHILS # BLD AUTO: 4.76 10*3/MM3 (ref 1.7–7)
NEUTROPHILS NFR BLD AUTO: 57.2 % (ref 42.7–76)
NRBC BLD AUTO-RTO: 0 /100 WBC (ref 0–0.2)
PLATELET # BLD AUTO: 135 10*3/MM3 (ref 140–450)
PMV BLD AUTO: 11.3 FL (ref 6–12)
POTASSIUM BLD-SCNC: 3.7 MMOL/L (ref 3.5–5.2)
RBC # BLD AUTO: 3.66 10*6/MM3 (ref 3.77–5.28)
SODIUM BLD-SCNC: 137 MMOL/L (ref 136–145)
WBC NRBC COR # BLD: 8.32 10*3/MM3 (ref 3.4–10.8)

## 2019-10-15 PROCEDURE — C1713 ANCHOR/SCREW BN/BN,TIS/BN: HCPCS | Performed by: ORTHOPAEDIC SURGERY

## 2019-10-15 PROCEDURE — 25010000003 BUPIVACAINE LIPOSOME 1.3 % SUSPENSION: Performed by: ORTHOPAEDIC SURGERY

## 2019-10-15 PROCEDURE — 25010000002 ROPIVACAINE PER 1 MG: Performed by: NURSE ANESTHETIST, CERTIFIED REGISTERED

## 2019-10-15 PROCEDURE — 25010000002 MIDAZOLAM PER 1 MG: Performed by: NURSE ANESTHETIST, CERTIFIED REGISTERED

## 2019-10-15 PROCEDURE — 25010000002 ONDANSETRON PER 1 MG: Performed by: NURSE ANESTHETIST, CERTIFIED REGISTERED

## 2019-10-15 PROCEDURE — 0QSC06Z REPOSITION LEFT LOWER FEMUR WITH INTRAMEDULLARY INTERNAL FIXATION DEVICE, OPEN APPROACH: ICD-10-PCS | Performed by: ORTHOPAEDIC SURGERY

## 2019-10-15 PROCEDURE — 25010000002 PROPOFOL 10 MG/ML EMULSION: Performed by: NURSE ANESTHETIST, CERTIFIED REGISTERED

## 2019-10-15 PROCEDURE — C1769 GUIDE WIRE: HCPCS | Performed by: ORTHOPAEDIC SURGERY

## 2019-10-15 PROCEDURE — 85014 HEMATOCRIT: CPT | Performed by: ORTHOPAEDIC SURGERY

## 2019-10-15 PROCEDURE — 99232 SBSQ HOSP IP/OBS MODERATE 35: CPT | Performed by: NURSE PRACTITIONER

## 2019-10-15 PROCEDURE — 94799 UNLISTED PULMONARY SVC/PX: CPT

## 2019-10-15 PROCEDURE — 25010000002 ONDANSETRON PER 1 MG: Performed by: FAMILY MEDICINE

## 2019-10-15 PROCEDURE — 25010000002 DEXAMETHASONE PER 1 MG: Performed by: NURSE ANESTHETIST, CERTIFIED REGISTERED

## 2019-10-15 PROCEDURE — 94770: CPT

## 2019-10-15 PROCEDURE — 73552 X-RAY EXAM OF FEMUR 2/>: CPT

## 2019-10-15 PROCEDURE — 25010000002 VANCOMYCIN PER 500 MG: Performed by: ORTHOPAEDIC SURGERY

## 2019-10-15 PROCEDURE — C9290 INJ, BUPIVACAINE LIPOSOME: HCPCS | Performed by: ORTHOPAEDIC SURGERY

## 2019-10-15 PROCEDURE — 85018 HEMOGLOBIN: CPT | Performed by: ORTHOPAEDIC SURGERY

## 2019-10-15 PROCEDURE — 80048 BASIC METABOLIC PNL TOTAL CA: CPT | Performed by: NURSE PRACTITIONER

## 2019-10-15 PROCEDURE — 82962 GLUCOSE BLOOD TEST: CPT

## 2019-10-15 PROCEDURE — 25010000002 FENTANYL CITRATE (PF) 100 MCG/2ML SOLUTION: Performed by: NURSE ANESTHETIST, CERTIFIED REGISTERED

## 2019-10-15 PROCEDURE — 25010000002 VANCOMYCIN 1 G RECONSTITUTED SOLUTION 1 EACH VIAL: Performed by: ORTHOPAEDIC SURGERY

## 2019-10-15 PROCEDURE — 25010000002 PHENYLEPHRINE PER 1 ML: Performed by: NURSE ANESTHETIST, CERTIFIED REGISTERED

## 2019-10-15 PROCEDURE — 85025 COMPLETE CBC W/AUTO DIFF WBC: CPT | Performed by: NURSE PRACTITIONER

## 2019-10-15 PROCEDURE — 27506 TREATMENT OF THIGH FRACTURE: CPT | Performed by: ORTHOPAEDIC SURGERY

## 2019-10-15 PROCEDURE — 25010000002 HYDROMORPHONE PER 4 MG: Performed by: ORTHOPAEDIC SURGERY

## 2019-10-15 DEVICE — LOCKING SCREW, FULLY THREADED: Type: IMPLANTABLE DEVICE | Site: FEMUR | Status: FUNCTIONAL

## 2019-10-15 DEVICE — SUPRACONDYLAR NAIL
Type: IMPLANTABLE DEVICE | Site: FEMUR | Status: FUNCTIONAL
Brand: T2

## 2019-10-15 RX ORDER — SODIUM CHLORIDE 0.9 % (FLUSH) 0.9 %
3 SYRINGE (ML) INJECTION EVERY 12 HOURS SCHEDULED
Status: DISCONTINUED | OUTPATIENT
Start: 2019-10-15 | End: 2019-10-15 | Stop reason: HOSPADM

## 2019-10-15 RX ORDER — SODIUM CHLORIDE 9 MG/ML
40 INJECTION, SOLUTION INTRAVENOUS AS NEEDED
Status: DISCONTINUED | OUTPATIENT
Start: 2019-10-15 | End: 2019-10-15 | Stop reason: HOSPADM

## 2019-10-15 RX ORDER — LIDOCAINE HYDROCHLORIDE 10 MG/ML
0.5 INJECTION, SOLUTION EPIDURAL; INFILTRATION; INTRACAUDAL; PERINEURAL ONCE AS NEEDED
Status: DISCONTINUED | OUTPATIENT
Start: 2019-10-15 | End: 2019-10-15 | Stop reason: HOSPADM

## 2019-10-15 RX ORDER — OXYCODONE HCL 10 MG/1
10 TABLET, FILM COATED, EXTENDED RELEASE ORAL ONCE
Status: DISCONTINUED | OUTPATIENT
Start: 2019-10-15 | End: 2019-10-15 | Stop reason: HOSPADM

## 2019-10-15 RX ORDER — PROPOFOL 10 MG/ML
VIAL (ML) INTRAVENOUS CONTINUOUS PRN
Status: DISCONTINUED | OUTPATIENT
Start: 2019-10-15 | End: 2019-10-15 | Stop reason: SURG

## 2019-10-15 RX ORDER — MIDAZOLAM HYDROCHLORIDE 1 MG/ML
1 INJECTION INTRAMUSCULAR; INTRAVENOUS
Status: DISCONTINUED | OUTPATIENT
Start: 2019-10-15 | End: 2019-10-15 | Stop reason: HOSPADM

## 2019-10-15 RX ORDER — MIDAZOLAM HYDROCHLORIDE 1 MG/ML
2 INJECTION INTRAMUSCULAR; INTRAVENOUS
Status: DISCONTINUED | OUTPATIENT
Start: 2019-10-15 | End: 2019-10-15 | Stop reason: HOSPADM

## 2019-10-15 RX ORDER — DIAPER,BRIEF,INFANT-TODD,DISP
EACH MISCELLANEOUS AS NEEDED
Status: DISCONTINUED | OUTPATIENT
Start: 2019-10-15 | End: 2019-10-15 | Stop reason: HOSPADM

## 2019-10-15 RX ORDER — PROPOFOL 10 MG/ML
VIAL (ML) INTRAVENOUS AS NEEDED
Status: DISCONTINUED | OUTPATIENT
Start: 2019-10-15 | End: 2019-10-15 | Stop reason: SURG

## 2019-10-15 RX ORDER — KETAMINE HYDROCHLORIDE 100 MG/ML
INJECTION INTRAMUSCULAR; INTRAVENOUS AS NEEDED
Status: DISCONTINUED | OUTPATIENT
Start: 2019-10-15 | End: 2019-10-15 | Stop reason: SURG

## 2019-10-15 RX ORDER — ROPIVACAINE HYDROCHLORIDE 5 MG/ML
INJECTION, SOLUTION EPIDURAL; INFILTRATION; PERINEURAL
Status: COMPLETED | OUTPATIENT
Start: 2019-10-15 | End: 2019-10-15

## 2019-10-15 RX ORDER — MAGNESIUM HYDROXIDE 1200 MG/15ML
LIQUID ORAL AS NEEDED
Status: DISCONTINUED | OUTPATIENT
Start: 2019-10-15 | End: 2019-10-15 | Stop reason: HOSPADM

## 2019-10-15 RX ORDER — SODIUM CHLORIDE 0.9 % (FLUSH) 0.9 %
1-10 SYRINGE (ML) INJECTION AS NEEDED
Status: DISCONTINUED | OUTPATIENT
Start: 2019-10-15 | End: 2019-10-15 | Stop reason: HOSPADM

## 2019-10-15 RX ORDER — SODIUM CHLORIDE, SODIUM LACTATE, POTASSIUM CHLORIDE, CALCIUM CHLORIDE 600; 310; 30; 20 MG/100ML; MG/100ML; MG/100ML; MG/100ML
9 INJECTION, SOLUTION INTRAVENOUS CONTINUOUS
Status: DISCONTINUED | OUTPATIENT
Start: 2019-10-15 | End: 2019-10-15

## 2019-10-15 RX ORDER — SODIUM CHLORIDE, SODIUM LACTATE, POTASSIUM CHLORIDE, CALCIUM CHLORIDE 600; 310; 30; 20 MG/100ML; MG/100ML; MG/100ML; MG/100ML
75 INJECTION, SOLUTION INTRAVENOUS CONTINUOUS
Status: DISCONTINUED | OUTPATIENT
Start: 2019-10-15 | End: 2019-10-16

## 2019-10-15 RX ORDER — LIDOCAINE HYDROCHLORIDE 20 MG/ML
INJECTION, SOLUTION INFILTRATION; PERINEURAL AS NEEDED
Status: DISCONTINUED | OUTPATIENT
Start: 2019-10-15 | End: 2019-10-15 | Stop reason: SURG

## 2019-10-15 RX ORDER — GLYCOPYRROLATE 0.2 MG/ML
INJECTION INTRAMUSCULAR; INTRAVENOUS AS NEEDED
Status: DISCONTINUED | OUTPATIENT
Start: 2019-10-15 | End: 2019-10-15 | Stop reason: SURG

## 2019-10-15 RX ORDER — PREGABALIN 75 MG/1
150 CAPSULE ORAL ONCE
Status: COMPLETED | OUTPATIENT
Start: 2019-10-15 | End: 2019-10-15

## 2019-10-15 RX ORDER — ONDANSETRON 2 MG/ML
4 INJECTION INTRAMUSCULAR; INTRAVENOUS ONCE AS NEEDED
Status: COMPLETED | OUTPATIENT
Start: 2019-10-15 | End: 2019-10-15

## 2019-10-15 RX ORDER — DEXAMETHASONE SODIUM PHOSPHATE 4 MG/ML
8 INJECTION, SOLUTION INTRA-ARTICULAR; INTRALESIONAL; INTRAMUSCULAR; INTRAVENOUS; SOFT TISSUE ONCE AS NEEDED
Status: COMPLETED | OUTPATIENT
Start: 2019-10-15 | End: 2019-10-15

## 2019-10-15 RX ORDER — ONDANSETRON 2 MG/ML
4 INJECTION INTRAMUSCULAR; INTRAVENOUS EVERY 4 HOURS PRN
Status: DISCONTINUED | OUTPATIENT
Start: 2019-10-15 | End: 2019-10-17 | Stop reason: HOSPADM

## 2019-10-15 RX ORDER — FENTANYL CITRATE 50 UG/ML
INJECTION, SOLUTION INTRAMUSCULAR; INTRAVENOUS AS NEEDED
Status: DISCONTINUED | OUTPATIENT
Start: 2019-10-15 | End: 2019-10-15 | Stop reason: SURG

## 2019-10-15 RX ADMIN — NICOTINE 1 PATCH: 14 PATCH TRANSDERMAL at 16:08

## 2019-10-15 RX ADMIN — HYDROMORPHONE HYDROCHLORIDE 1 MG: 1 INJECTION, SOLUTION INTRAMUSCULAR; INTRAVENOUS; SUBCUTANEOUS at 04:22

## 2019-10-15 RX ADMIN — KETAMINE HYDROCHLORIDE 20 MG: 100 INJECTION INTRAMUSCULAR; INTRAVENOUS at 12:00

## 2019-10-15 RX ADMIN — IPRATROPIUM BROMIDE AND ALBUTEROL SULFATE 3 ML: .5; 3 SOLUTION RESPIRATORY (INHALATION) at 19:36

## 2019-10-15 RX ADMIN — PROPOFOL 10 MG: 10 INJECTION, EMULSION INTRAVENOUS at 12:00

## 2019-10-15 RX ADMIN — BUSPIRONE HYDROCHLORIDE 5 MG: 5 TABLET ORAL at 20:40

## 2019-10-15 RX ADMIN — BUDESONIDE 0.5 MG: 0.5 SUSPENSION RESPIRATORY (INHALATION) at 07:16

## 2019-10-15 RX ADMIN — HYDROCODONE BITARTRATE AND ACETAMINOPHEN 1 TABLET: 7.5; 325 TABLET ORAL at 19:08

## 2019-10-15 RX ADMIN — GABAPENTIN 300 MG: 300 CAPSULE ORAL at 20:32

## 2019-10-15 RX ADMIN — SODIUM CHLORIDE, POTASSIUM CHLORIDE, SODIUM LACTATE AND CALCIUM CHLORIDE 75 ML/HR: 600; 310; 30; 20 INJECTION, SOLUTION INTRAVENOUS at 16:18

## 2019-10-15 RX ADMIN — BUSPIRONE HYDROCHLORIDE 5 MG: 5 TABLET ORAL at 16:09

## 2019-10-15 RX ADMIN — MIDAZOLAM HYDROCHLORIDE 1 MG: 1 INJECTION, SOLUTION INTRAMUSCULAR; INTRAVENOUS at 09:09

## 2019-10-15 RX ADMIN — HYDROMORPHONE HYDROCHLORIDE 1 MG: 1 INJECTION, SOLUTION INTRAMUSCULAR; INTRAVENOUS; SUBCUTANEOUS at 21:47

## 2019-10-15 RX ADMIN — DOCUSATE SODIUM 100 MG: 100 CAPSULE, LIQUID FILLED ORAL at 20:35

## 2019-10-15 RX ADMIN — LISINOPRIL 5 MG: 5 TABLET ORAL at 16:08

## 2019-10-15 RX ADMIN — Medication 250 MG: at 20:40

## 2019-10-15 RX ADMIN — LIDOCAINE HYDROCHLORIDE 10 MG: 20 INJECTION, SOLUTION INFILTRATION; PERINEURAL at 12:00

## 2019-10-15 RX ADMIN — DEXAMETHASONE SODIUM PHOSPHATE 8 MG: 4 INJECTION, SOLUTION INTRAMUSCULAR; INTRAVENOUS at 09:05

## 2019-10-15 RX ADMIN — ONDANSETRON 4 MG: 2 INJECTION, SOLUTION INTRAMUSCULAR; INTRAVENOUS at 09:07

## 2019-10-15 RX ADMIN — PHENYLEPHRINE HYDROCHLORIDE 50 MCG: 10 INJECTION INTRAVENOUS at 12:49

## 2019-10-15 RX ADMIN — LORAZEPAM 0.5 MG: 0.5 TABLET ORAL at 16:08

## 2019-10-15 RX ADMIN — PHENYLEPHRINE HYDROCHLORIDE 50 MCG: 10 INJECTION INTRAVENOUS at 12:45

## 2019-10-15 RX ADMIN — PHENYLEPHRINE HYDROCHLORIDE 25 MCG: 10 INJECTION INTRAVENOUS at 12:21

## 2019-10-15 RX ADMIN — SODIUM CHLORIDE, POTASSIUM CHLORIDE, SODIUM LACTATE AND CALCIUM CHLORIDE 9 ML/HR: 600; 310; 30; 20 INJECTION, SOLUTION INTRAVENOUS at 08:20

## 2019-10-15 RX ADMIN — SODIUM CHLORIDE, POTASSIUM CHLORIDE, SODIUM LACTATE AND CALCIUM CHLORIDE 75 ML/HR: 600; 310; 30; 20 INJECTION, SOLUTION INTRAVENOUS at 16:30

## 2019-10-15 RX ADMIN — Medication 3 MG: at 20:40

## 2019-10-15 RX ADMIN — GABAPENTIN 300 MG: 300 CAPSULE ORAL at 16:08

## 2019-10-15 RX ADMIN — MIRTAZAPINE 30 MG: 15 TABLET, FILM COATED ORAL at 20:35

## 2019-10-15 RX ADMIN — METFORMIN HYDROCHLORIDE 500 MG: 500 TABLET ORAL at 19:08

## 2019-10-15 RX ADMIN — PHENYLEPHRINE HYDROCHLORIDE 25 MCG: 10 INJECTION INTRAVENOUS at 12:22

## 2019-10-15 RX ADMIN — KETAMINE HYDROCHLORIDE 5 MG: 100 INJECTION INTRAMUSCULAR; INTRAVENOUS at 12:29

## 2019-10-15 RX ADMIN — PHENYLEPHRINE HYDROCHLORIDE 50 MCG: 10 INJECTION INTRAVENOUS at 12:15

## 2019-10-15 RX ADMIN — KETAMINE HYDROCHLORIDE 5 MG: 100 INJECTION INTRAMUSCULAR; INTRAVENOUS at 12:07

## 2019-10-15 RX ADMIN — PREGABALIN 150 MG: 75 CAPSULE ORAL at 08:44

## 2019-10-15 RX ADMIN — FAMOTIDINE 20 MG: 10 INJECTION, SOLUTION INTRAVENOUS at 09:07

## 2019-10-15 RX ADMIN — PHENYLEPHRINE HYDROCHLORIDE 100 MCG: 10 INJECTION INTRAVENOUS at 12:54

## 2019-10-15 RX ADMIN — ROPIVACAINE HYDROCHLORIDE 20 ML: 5 INJECTION, SOLUTION EPIDURAL; INFILTRATION; PERINEURAL at 09:14

## 2019-10-15 RX ADMIN — IPRATROPIUM BROMIDE AND ALBUTEROL SULFATE 3 ML: .5; 3 SOLUTION RESPIRATORY (INHALATION) at 07:16

## 2019-10-15 RX ADMIN — DOXYCYCLINE 100 MG: 100 CAPSULE ORAL at 20:35

## 2019-10-15 RX ADMIN — ONDANSETRON 4 MG: 2 INJECTION, SOLUTION INTRAMUSCULAR; INTRAVENOUS at 19:57

## 2019-10-15 RX ADMIN — PROPOFOL 100 MCG/KG/MIN: 10 INJECTION, EMULSION INTRAVENOUS at 12:00

## 2019-10-15 RX ADMIN — VANCOMYCIN HYDROCHLORIDE 1500 MG: 500 INJECTION, POWDER, LYOPHILIZED, FOR SOLUTION INTRAVENOUS at 08:20

## 2019-10-15 RX ADMIN — IPRATROPIUM BROMIDE AND ALBUTEROL SULFATE 3 ML: .5; 3 SOLUTION RESPIRATORY (INHALATION) at 15:54

## 2019-10-15 RX ADMIN — PHENYLEPHRINE HYDROCHLORIDE 50 MCG: 10 INJECTION INTRAVENOUS at 12:33

## 2019-10-15 RX ADMIN — PHENYLEPHRINE HYDROCHLORIDE 50 MCG: 10 INJECTION INTRAVENOUS at 12:29

## 2019-10-15 RX ADMIN — PHENYLEPHRINE HYDROCHLORIDE 25 MCG: 10 INJECTION INTRAVENOUS at 12:11

## 2019-10-15 RX ADMIN — VANCOMYCIN HYDROCHLORIDE 1500 MG: 500 INJECTION, POWDER, LYOPHILIZED, FOR SOLUTION INTRAVENOUS at 20:32

## 2019-10-15 RX ADMIN — GLYCOPYRROLATE 0.1 MG: 0.2 INJECTION INTRAMUSCULAR; INTRAVENOUS at 12:00

## 2019-10-15 RX ADMIN — PHENYLEPHRINE HYDROCHLORIDE 50 MCG: 10 INJECTION INTRAVENOUS at 13:00

## 2019-10-15 RX ADMIN — FENTANYL CITRATE 25 MCG: 50 INJECTION, SOLUTION INTRAMUSCULAR; INTRAVENOUS at 12:01

## 2019-10-15 RX ADMIN — BUDESONIDE 0.5 MG: 0.5 SUSPENSION RESPIRATORY (INHALATION) at 19:36

## 2019-10-15 RX ADMIN — PHENYLEPHRINE HYDROCHLORIDE 100 MCG: 10 INJECTION INTRAVENOUS at 12:41

## 2019-10-15 NOTE — ANESTHESIA PREPROCEDURE EVALUATION
Anesthesia Evaluation     Patient summary reviewed and Nursing notes reviewed   no history of anesthetic complications:  NPO Status: > 8 hours   Airway   Mallampati: III  TM distance: >3 FB  Neck ROM: full  possible difficult intubation  Dental    (+) upper dentures and poor dentition    Comment: Poor lower teeth, nothing loose      Pulmonary    (+) a smoker (1/2 ppd x 1 yr) Current, COPD mild, decreased breath sounds,     ROS comment: O2 SAT 90%       3/7/17Site  Arterial: right radial  Colin's Test  Positive  pH, Arterial 7.350 - 7.450 pH units 7.397  pCO2, Arterial 35.0 - 45.0 mm Hg 48.7 (H)  pO2, Arterial 80.0 - 100.0 mm Hg 60.7 (L)  HCO3, Arterial 22.0 - 26.0 mmol/L 29.3 (H)  Base Excess, Arterial -2.3 - 2.3 mmol/L 3.7 (H)  O2 Saturation Calculated 91.0 - 100.0 % 90.7 (L)  Hemoglobin, Blood Gas 12 - 18 g/dL 12.1  Barometric Pressure for Blood Gas mmHg 749.3  Modality  Room air  Rate Breaths/minute 21  Pulse Ox % 88  Resulting Agency  [facility]      Cardiovascular - normal exam  Exercise tolerance: poor (<4 METS)    Rhythm: regular  Rate: normal    (+) DVT resolved,     ROS comment: Denies CV sequalae    Nl ECHO 2015    Neuro/Psych  (+) numbness (hands), psychiatric history (Psychosis) Anxiety, poor historian.,    GI/Hepatic/Renal/Endo    (+) obesity,  GERD well controlled, chronic renal disease (pt denies, DELONTE),     Musculoskeletal     (+) back pain,       ROS comment: Wheel chair bound, foot drop R, has not been able to walk > 1 year  Abdominal   (+) obese,    Substance History      OB/GYN          Other   (+) arthritis                         Anesthesia Plan    ASA 3     spinal     Anesthetic plan and risks discussed with patient.  Use of blood products discussed with patient .    Anesthesia Evaluation     Patient summary reviewed and Nursing notes reviewed   no history of anesthetic complications:  NPO Status: > 8 hours   Airway   Mallampati: III  TM distance: >3 FB  Neck ROM: full  possible difficult  intubation  Dental    (+) upper dentures and poor dentition    Comment: Poor lower teeth, nothing loose      Pulmonary    (+) a smoker (1/2 ppd x 1 yr) Current, COPD mild, decreased breath sounds,     ROS comment: O2 SAT 90%       3/7/17Site  Arterial: right radial  Colin's Test  Positive  pH, Arterial 7.350 - 7.450 pH units 7.397  pCO2, Arterial 35.0 - 45.0 mm Hg 48.7 (H)  pO2, Arterial 80.0 - 100.0 mm Hg 60.7 (L)  HCO3, Arterial 22.0 - 26.0 mmol/L 29.3 (H)  Base Excess, Arterial -2.3 - 2.3 mmol/L 3.7 (H)  O2 Saturation Calculated 91.0 - 100.0 % 90.7 (L)  Hemoglobin, Blood Gas 12 - 18 g/dL 12.1  Barometric Pressure for Blood Gas mmHg 749.3  Modality  Room air  Rate Breaths/minute 21  Pulse Ox % 88  Resulting Agency  [facility]      Cardiovascular - normal exam  Exercise tolerance: poor (<4 METS)    Rhythm: regular  Rate: normal    (+) DVT resolved,     ROS comment: Denies CV sequalae    Nl ECHO 2015    Neuro/Psych  (+) numbness (hands), psychiatric history (Psychosis) Anxiety, poor historian.,    GI/Hepatic/Renal/Endo    (+) obesity,  GERD well controlled, chronic renal disease (pt denies, DELONTE),     Musculoskeletal     (+) back pain,       ROS comment: Wheel chair bound, foot drop R, has not been able to walk > 1 year  Abdominal   (+) obese,    Substance History      OB/GYN          Other   (+) arthritis                         Anesthesia Plan    ASA 3     spinal     Anesthetic plan and risks discussed with patient.  Use of blood products discussed with patient .            Anesthesia Plan    ASA 3     spinal and regional   (Patient consents to femoral block + SAB. Understands minimal sedation likely due to active flu)  intravenous induction   Anesthetic plan, all risks, benefits, and alternatives have been provided, discussed and informed consent has been obtained with: patient.  Use of blood products discussed with patient .   Plan discussed with CRNA.

## 2019-10-15 NOTE — ANESTHESIA POSTPROCEDURE EVALUATION
Patient: Mariangel Thomas    Procedure Summary     Date:  10/15/19 Room / Location:   [facility]    Anesthesia Start:  1149 Anesthesia Stop:  1323    Procedure:  FEMUR INTRAMEDULLARY NAILING RETROGRADE (Left Thigh) Diagnosis:       Nondisplaced supracondylar fracture without intracondylar extension of lower end of left femur, initial encounter for closed fracture (CMS/HCC)      (Nondisplaced supracondylar fracture without intracondylar extension of lower end of left femur, initial encounter for closed fracture (CMS/HCC) [S72.455A])    Surgeon:  Piter Chavez MD Provider:  Rosa Elena Jhaveri CRNA    Anesthesia Type:  spinal, regional ASA Status:  3          Anesthesia Type: spinal, regional  Last vitals  BP   110/72 (10/15/19 1340)   Temp   98.6 °F (37 °C) (10/15/19 1319)   Pulse   83 (10/15/19 1340)   Resp   20 (10/15/19 1340)     SpO2   93 % (10/15/19 1340)     Post Anesthesia Care and Evaluation    Patient location during evaluation: PACU  Patient participation: complete - patient participated  Level of consciousness: awake and alert  Pain score: 0  Pain management: satisfactory to patient  Airway patency: patent  Anesthetic complications: No anesthetic complications  PONV Status: none  Cardiovascular status: acceptable  Respiratory status: acceptable (periods of oxygen sats dropping to 88%, coughin, no change from pre-op.)  Hydration status: acceptable

## 2019-10-15 NOTE — ANESTHESIA PROCEDURE NOTES
Peripheral Block    Pre-sedation assessment completed: 10/15/2019 9:10 AM    Patient reassessed immediately prior to procedure    Patient location during procedure: pre-op  Start time: 10/15/2019 9:11 AM  Stop time: 10/15/2019 9:14 AM  Reason for block: at surgeon's request, post-op pain management and secondary anesthetic  Performed by  CRNA: Amber Marquez CRNA  Preanesthetic Checklist  Completed: patient identified, site marked, surgical consent, pre-op evaluation, timeout performed, IV checked, risks and benefits discussed and monitors and equipment checked  Prep:  Pt Position: supine  Sterile barriers:cap, mask, sterile barriers, gown and gloves  Prep: ChloraPrep  Patient monitoring: blood pressure monitoring, continuous pulse oximetry and EKG  Procedure  Sedation:yes  Performed under: PNB  Guidance:ultrasound guided  ULTRASOUND INTERPRETATION.  Using ultrasound guidance a 22 G gauge needle was placed in close proximity to the femoral nerve, at which point, under ultrasound guidance anesthetic was injected in the area of the nerve and spread of the anesthesia was seen on ultrasound in close proximity thereto.  There were no abnormalities seen on ultrasound; a digital image was taken; and the patient tolerated the procedure with no complications. Images:still images obtained, printed/placed on chart    Laterality:left  Block Type:femoral  Injection Technique:single-shot  Needle Type:echogenic  Needle Gauge:21 G  Resistance on Injection: none    Medications Used: ropivacaine (NAROPIN) injection 0.5 %, 20 mL  Med admintered at 10/15/2019 9:14 AM      Post Assessment  Injection Assessment: negative aspiration for heme, no paresthesia on injection and incremental injection  Patient Tolerance:comfortable throughout block  Complications:no

## 2019-10-16 ENCOUNTER — APPOINTMENT (OUTPATIENT)
Dept: GENERAL RADIOLOGY | Facility: HOSPITAL | Age: 59
End: 2019-10-16

## 2019-10-16 LAB
ANION GAP SERPL CALCULATED.3IONS-SCNC: 9.9 MMOL/L (ref 5–15)
BASOPHILS # BLD AUTO: 0.05 10*3/MM3 (ref 0–0.2)
BASOPHILS NFR BLD AUTO: 0.6 % (ref 0–1.5)
BUN BLD-MCNC: 13 MG/DL (ref 8–23)
BUN/CREAT SERPL: 22.8 (ref 7–25)
CALCIUM SPEC-SCNC: 8.9 MG/DL (ref 8.6–10.5)
CHLORIDE SERPL-SCNC: 102 MMOL/L (ref 98–107)
CO2 SERPL-SCNC: 34.1 MMOL/L (ref 22–29)
CREAT BLD-MCNC: 0.57 MG/DL (ref 0.57–1)
DEPRECATED RDW RBC AUTO: 49.4 FL (ref 37–54)
EOSINOPHIL # BLD AUTO: 0.01 10*3/MM3 (ref 0–0.4)
EOSINOPHIL NFR BLD AUTO: 0.1 % (ref 0.3–6.2)
ERYTHROCYTE [DISTWIDTH] IN BLOOD BY AUTOMATED COUNT: 14.6 % (ref 12.3–15.4)
GFR SERPL CREATININE-BSD FRML MDRD: 108 ML/MIN/1.73
GLUCOSE BLD-MCNC: 108 MG/DL (ref 65–99)
GLUCOSE BLDC GLUCOMTR-MCNC: 122 MG/DL (ref 70–130)
GLUCOSE BLDC GLUCOMTR-MCNC: 151 MG/DL (ref 70–130)
GLUCOSE BLDC GLUCOMTR-MCNC: 151 MG/DL (ref 70–130)
HCT VFR BLD AUTO: 32.7 % (ref 34–46.6)
HGB BLD-MCNC: 10.1 G/DL (ref 12–15.9)
IMM GRANULOCYTES # BLD AUTO: 0.04 10*3/MM3 (ref 0–0.05)
IMM GRANULOCYTES NFR BLD AUTO: 0.5 % (ref 0–0.5)
LYMPHOCYTES # BLD AUTO: 2.23 10*3/MM3 (ref 0.7–3.1)
LYMPHOCYTES NFR BLD AUTO: 26 % (ref 19.6–45.3)
MCH RBC QN AUTO: 29 PG (ref 26.6–33)
MCHC RBC AUTO-ENTMCNC: 30.9 G/DL (ref 31.5–35.7)
MCV RBC AUTO: 94 FL (ref 79–97)
MONOCYTES # BLD AUTO: 1.38 10*3/MM3 (ref 0.1–0.9)
MONOCYTES NFR BLD AUTO: 16.1 % (ref 5–12)
NEUTROPHILS # BLD AUTO: 4.87 10*3/MM3 (ref 1.7–7)
NEUTROPHILS NFR BLD AUTO: 56.7 % (ref 42.7–76)
NRBC BLD AUTO-RTO: 0 /100 WBC (ref 0–0.2)
PLATELET # BLD AUTO: 132 10*3/MM3 (ref 140–450)
PMV BLD AUTO: 12.1 FL (ref 6–12)
POTASSIUM BLD-SCNC: 4 MMOL/L (ref 3.5–5.2)
PROCALCITONIN SERPL-MCNC: 0.08 NG/ML (ref 0.1–0.25)
RBC # BLD AUTO: 3.48 10*6/MM3 (ref 3.77–5.28)
SODIUM BLD-SCNC: 146 MMOL/L (ref 136–145)
VANCOMYCIN SERPL-MCNC: 18 MCG/ML (ref 5–40)
WBC NRBC COR # BLD: 8.58 10*3/MM3 (ref 3.4–10.8)

## 2019-10-16 PROCEDURE — 25010000002 ONDANSETRON PER 1 MG: Performed by: HOSPITALIST

## 2019-10-16 PROCEDURE — 82962 GLUCOSE BLOOD TEST: CPT

## 2019-10-16 PROCEDURE — 85025 COMPLETE CBC W/AUTO DIFF WBC: CPT | Performed by: NURSE PRACTITIONER

## 2019-10-16 PROCEDURE — 71046 X-RAY EXAM CHEST 2 VIEWS: CPT

## 2019-10-16 PROCEDURE — 84145 PROCALCITONIN (PCT): CPT | Performed by: NURSE PRACTITIONER

## 2019-10-16 PROCEDURE — 80048 BASIC METABOLIC PNL TOTAL CA: CPT | Performed by: NURSE PRACTITIONER

## 2019-10-16 PROCEDURE — 99024 POSTOP FOLLOW-UP VISIT: CPT | Performed by: ORTHOPAEDIC SURGERY

## 2019-10-16 PROCEDURE — 97166 OT EVAL MOD COMPLEX 45 MIN: CPT

## 2019-10-16 PROCEDURE — 99232 SBSQ HOSP IP/OBS MODERATE 35: CPT | Performed by: NURSE PRACTITIONER

## 2019-10-16 PROCEDURE — 94799 UNLISTED PULMONARY SVC/PX: CPT

## 2019-10-16 PROCEDURE — 97162 PT EVAL MOD COMPLEX 30 MIN: CPT

## 2019-10-16 PROCEDURE — 97161 PT EVAL LOW COMPLEX 20 MIN: CPT

## 2019-10-16 PROCEDURE — 80202 ASSAY OF VANCOMYCIN: CPT | Performed by: FAMILY MEDICINE

## 2019-10-16 RX ORDER — GUAIFENESIN 600 MG/1
1200 TABLET, EXTENDED RELEASE ORAL 2 TIMES DAILY
Status: DISCONTINUED | OUTPATIENT
Start: 2019-10-16 | End: 2019-10-17 | Stop reason: HOSPADM

## 2019-10-16 RX ORDER — DOXYCYCLINE 100 MG/1
CAPSULE ORAL
Status: COMPLETED
Start: 2019-10-16 | End: 2019-10-16

## 2019-10-16 RX ADMIN — POLYETHYLENE GLYCOL 3350 17 G: 17 POWDER, FOR SOLUTION ORAL at 09:55

## 2019-10-16 RX ADMIN — BUDESONIDE 0.5 MG: 0.5 SUSPENSION RESPIRATORY (INHALATION) at 07:31

## 2019-10-16 RX ADMIN — IPRATROPIUM BROMIDE AND ALBUTEROL SULFATE 3 ML: .5; 3 SOLUTION RESPIRATORY (INHALATION) at 20:14

## 2019-10-16 RX ADMIN — HYDROCODONE BITARTRATE AND ACETAMINOPHEN 1 TABLET: 7.5; 325 TABLET ORAL at 14:06

## 2019-10-16 RX ADMIN — APIXABAN 2.5 MG: 2.5 TABLET, FILM COATED ORAL at 20:41

## 2019-10-16 RX ADMIN — LORAZEPAM 0.5 MG: 0.5 TABLET ORAL at 19:48

## 2019-10-16 RX ADMIN — HYDROCODONE BITARTRATE AND ACETAMINOPHEN 1 TABLET: 7.5; 325 TABLET ORAL at 10:00

## 2019-10-16 RX ADMIN — GABAPENTIN 300 MG: 300 CAPSULE ORAL at 16:20

## 2019-10-16 RX ADMIN — BUDESONIDE 0.5 MG: 0.5 SUSPENSION RESPIRATORY (INHALATION) at 20:14

## 2019-10-16 RX ADMIN — LISINOPRIL 5 MG: 5 TABLET ORAL at 09:54

## 2019-10-16 RX ADMIN — IPRATROPIUM BROMIDE AND ALBUTEROL SULFATE 3 ML: .5; 3 SOLUTION RESPIRATORY (INHALATION) at 07:31

## 2019-10-16 RX ADMIN — Medication 3 MG: at 20:41

## 2019-10-16 RX ADMIN — PANTOPRAZOLE SODIUM 40 MG: 40 TABLET, DELAYED RELEASE ORAL at 06:00

## 2019-10-16 RX ADMIN — ONDANSETRON 4 MG: 2 INJECTION, SOLUTION INTRAMUSCULAR; INTRAVENOUS at 01:12

## 2019-10-16 RX ADMIN — MIRTAZAPINE 30 MG: 15 TABLET, FILM COATED ORAL at 20:41

## 2019-10-16 RX ADMIN — BUSPIRONE HYDROCHLORIDE 5 MG: 5 TABLET ORAL at 10:06

## 2019-10-16 RX ADMIN — BUSPIRONE HYDROCHLORIDE 5 MG: 5 TABLET ORAL at 16:20

## 2019-10-16 RX ADMIN — SODIUM CHLORIDE, POTASSIUM CHLORIDE, SODIUM LACTATE AND CALCIUM CHLORIDE 75 ML/HR: 600; 310; 30; 20 INJECTION, SOLUTION INTRAVENOUS at 06:46

## 2019-10-16 RX ADMIN — APIXABAN 2.5 MG: 2.5 TABLET, FILM COATED ORAL at 09:54

## 2019-10-16 RX ADMIN — Medication 250 MG: at 20:41

## 2019-10-16 RX ADMIN — METFORMIN HYDROCHLORIDE 500 MG: 500 TABLET ORAL at 17:57

## 2019-10-16 RX ADMIN — ARFORMOTEROL TARTRATE 15 MCG: 15 SOLUTION RESPIRATORY (INHALATION) at 09:28

## 2019-10-16 RX ADMIN — ARFORMOTEROL TARTRATE 15 MCG: 15 SOLUTION RESPIRATORY (INHALATION) at 22:08

## 2019-10-16 RX ADMIN — HYDROCODONE BITARTRATE AND ACETAMINOPHEN 1 TABLET: 7.5; 325 TABLET ORAL at 22:20

## 2019-10-16 RX ADMIN — HYDROCODONE BITARTRATE AND ACETAMINOPHEN 1 TABLET: 7.5; 325 TABLET ORAL at 05:59

## 2019-10-16 RX ADMIN — IPRATROPIUM BROMIDE AND ALBUTEROL SULFATE 3 ML: .5; 3 SOLUTION RESPIRATORY (INHALATION) at 15:49

## 2019-10-16 RX ADMIN — NICOTINE 1 PATCH: 14 PATCH TRANSDERMAL at 09:59

## 2019-10-16 RX ADMIN — IPRATROPIUM BROMIDE AND ALBUTEROL SULFATE 3 ML: .5; 3 SOLUTION RESPIRATORY (INHALATION) at 11:37

## 2019-10-16 RX ADMIN — Medication 250 MG: at 09:54

## 2019-10-16 RX ADMIN — METFORMIN HYDROCHLORIDE 500 MG: 500 TABLET ORAL at 09:53

## 2019-10-16 RX ADMIN — BUSPIRONE HYDROCHLORIDE 5 MG: 5 TABLET ORAL at 20:41

## 2019-10-16 RX ADMIN — ONDANSETRON 4 MG: 2 INJECTION, SOLUTION INTRAMUSCULAR; INTRAVENOUS at 07:03

## 2019-10-16 RX ADMIN — GUAIFENESIN 1200 MG: 600 TABLET, EXTENDED RELEASE ORAL at 20:41

## 2019-10-16 RX ADMIN — DOXYCYCLINE 100 MG: 100 CAPSULE ORAL at 21:04

## 2019-10-16 RX ADMIN — DOCUSATE SODIUM 100 MG: 100 CAPSULE, LIQUID FILLED ORAL at 09:55

## 2019-10-16 RX ADMIN — GABAPENTIN 300 MG: 300 CAPSULE ORAL at 09:55

## 2019-10-16 RX ADMIN — HYDROCODONE BITARTRATE AND ACETAMINOPHEN 1 TABLET: 7.5; 325 TABLET ORAL at 01:12

## 2019-10-16 RX ADMIN — DOXYCYCLINE 100 MG: 100 CAPSULE ORAL at 10:13

## 2019-10-16 RX ADMIN — GABAPENTIN 300 MG: 300 CAPSULE ORAL at 20:41

## 2019-10-16 RX ADMIN — DOCUSATE SODIUM 100 MG: 100 CAPSULE, LIQUID FILLED ORAL at 20:41

## 2019-10-16 RX ADMIN — CITALOPRAM HYDROBROMIDE 30 MG: 20 TABLET ORAL at 09:54

## 2019-10-16 RX ADMIN — HYDROCODONE BITARTRATE AND ACETAMINOPHEN 1 TABLET: 7.5; 325 TABLET ORAL at 17:57

## 2019-10-17 VITALS
BODY MASS INDEX: 32.71 KG/M2 | DIASTOLIC BLOOD PRESSURE: 63 MMHG | TEMPERATURE: 97.5 F | RESPIRATION RATE: 14 BRPM | HEART RATE: 78 BPM | HEIGHT: 67 IN | WEIGHT: 208.4 LBS | SYSTOLIC BLOOD PRESSURE: 98 MMHG | OXYGEN SATURATION: 92 %

## 2019-10-17 LAB
ANION GAP SERPL CALCULATED.3IONS-SCNC: 6 MMOL/L (ref 5–15)
BACTERIA SPEC AEROBE CULT: NORMAL
BACTERIA SPEC AEROBE CULT: NORMAL
BASOPHILS # BLD AUTO: 0.05 10*3/MM3 (ref 0–0.2)
BASOPHILS NFR BLD AUTO: 0.5 % (ref 0–1.5)
BUN BLD-MCNC: 13 MG/DL (ref 8–23)
BUN/CREAT SERPL: 22 (ref 7–25)
CALCIUM SPEC-SCNC: 8.6 MG/DL (ref 8.6–10.5)
CHLORIDE SERPL-SCNC: 102 MMOL/L (ref 98–107)
CO2 SERPL-SCNC: 33 MMOL/L (ref 22–29)
CREAT BLD-MCNC: 0.59 MG/DL (ref 0.57–1)
DEPRECATED RDW RBC AUTO: 52.2 FL (ref 37–54)
EOSINOPHIL # BLD AUTO: 0.2 10*3/MM3 (ref 0–0.4)
EOSINOPHIL NFR BLD AUTO: 1.9 % (ref 0.3–6.2)
ERYTHROCYTE [DISTWIDTH] IN BLOOD BY AUTOMATED COUNT: 14.8 % (ref 12.3–15.4)
GFR SERPL CREATININE-BSD FRML MDRD: 104 ML/MIN/1.73
GLUCOSE BLD-MCNC: 93 MG/DL (ref 65–99)
GLUCOSE BLDC GLUCOMTR-MCNC: 133 MG/DL (ref 70–130)
GLUCOSE BLDC GLUCOMTR-MCNC: 224 MG/DL (ref 70–130)
HCT VFR BLD AUTO: 27.8 % (ref 34–46.6)
HGB BLD-MCNC: 8.4 G/DL (ref 12–15.9)
IMM GRANULOCYTES # BLD AUTO: 0.03 10*3/MM3 (ref 0–0.05)
IMM GRANULOCYTES NFR BLD AUTO: 0.3 % (ref 0–0.5)
LYMPHOCYTES # BLD AUTO: 3.12 10*3/MM3 (ref 0.7–3.1)
LYMPHOCYTES NFR BLD AUTO: 29.3 % (ref 19.6–45.3)
MCH RBC QN AUTO: 29.2 PG (ref 26.6–33)
MCHC RBC AUTO-ENTMCNC: 30.2 G/DL (ref 31.5–35.7)
MCV RBC AUTO: 96.5 FL (ref 79–97)
MONOCYTES # BLD AUTO: 1.23 10*3/MM3 (ref 0.1–0.9)
MONOCYTES NFR BLD AUTO: 11.5 % (ref 5–12)
NEUTROPHILS # BLD AUTO: 6.03 10*3/MM3 (ref 1.7–7)
NEUTROPHILS NFR BLD AUTO: 56.5 % (ref 42.7–76)
NRBC BLD AUTO-RTO: 0 /100 WBC (ref 0–0.2)
PLATELET # BLD AUTO: 140 10*3/MM3 (ref 140–450)
PMV BLD AUTO: 12.4 FL (ref 6–12)
POTASSIUM BLD-SCNC: 3.5 MMOL/L (ref 3.5–5.2)
RBC # BLD AUTO: 2.88 10*6/MM3 (ref 3.77–5.28)
SODIUM BLD-SCNC: 141 MMOL/L (ref 136–145)
WBC NRBC COR # BLD: 10.66 10*3/MM3 (ref 3.4–10.8)

## 2019-10-17 PROCEDURE — 85025 COMPLETE CBC W/AUTO DIFF WBC: CPT | Performed by: NURSE PRACTITIONER

## 2019-10-17 PROCEDURE — 82962 GLUCOSE BLOOD TEST: CPT

## 2019-10-17 PROCEDURE — 97110 THERAPEUTIC EXERCISES: CPT

## 2019-10-17 PROCEDURE — 25010000002 INFLUENZA VAC SUBUNIT QUAD 0.5 ML SUSPENSION PREFILLED SYRINGE: Performed by: HOSPITALIST

## 2019-10-17 PROCEDURE — 99024 POSTOP FOLLOW-UP VISIT: CPT | Performed by: ORTHOPAEDIC SURGERY

## 2019-10-17 PROCEDURE — 90674 CCIIV4 VAC NO PRSV 0.5 ML IM: CPT | Performed by: HOSPITALIST

## 2019-10-17 PROCEDURE — 25010000002 HYDROMORPHONE PER 4 MG: Performed by: ORTHOPAEDIC SURGERY

## 2019-10-17 PROCEDURE — 92610 EVALUATE SWALLOWING FUNCTION: CPT

## 2019-10-17 PROCEDURE — 80048 BASIC METABOLIC PNL TOTAL CA: CPT | Performed by: NURSE PRACTITIONER

## 2019-10-17 PROCEDURE — 99239 HOSP IP/OBS DSCHRG MGMT >30: CPT | Performed by: NURSE PRACTITIONER

## 2019-10-17 PROCEDURE — 97530 THERAPEUTIC ACTIVITIES: CPT

## 2019-10-17 PROCEDURE — G0008 ADMIN INFLUENZA VIRUS VAC: HCPCS | Performed by: HOSPITALIST

## 2019-10-17 PROCEDURE — 94799 UNLISTED PULMONARY SVC/PX: CPT

## 2019-10-17 RX ORDER — DOCUSATE SODIUM 100 MG/1
100 CAPSULE, LIQUID FILLED ORAL 2 TIMES DAILY
Start: 2019-10-17

## 2019-10-17 RX ORDER — GUAIFENESIN 600 MG/1
1200 TABLET, EXTENDED RELEASE ORAL 2 TIMES DAILY
Start: 2019-10-17

## 2019-10-17 RX ORDER — IPRATROPIUM BROMIDE AND ALBUTEROL SULFATE 2.5; .5 MG/3ML; MG/3ML
3 SOLUTION RESPIRATORY (INHALATION)
Qty: 360 ML
Start: 2019-10-17

## 2019-10-17 RX ORDER — DOXYCYCLINE 100 MG/1
100 CAPSULE ORAL EVERY 12 HOURS SCHEDULED
Qty: 9 CAPSULE | Refills: 0
Start: 2019-10-17 | End: 2019-10-22

## 2019-10-17 RX ORDER — IPRATROPIUM BROMIDE AND ALBUTEROL SULFATE 2.5; .5 MG/3ML; MG/3ML
3 SOLUTION RESPIRATORY (INHALATION) EVERY 4 HOURS PRN
Qty: 360 ML
Start: 2019-10-17

## 2019-10-17 RX ORDER — GABAPENTIN 300 MG/1
300 CAPSULE ORAL 3 TIMES DAILY
Qty: 6 CAPSULE | Refills: 0 | Status: SHIPPED | OUTPATIENT
Start: 2019-10-17 | End: 2019-10-19

## 2019-10-17 RX ORDER — OXYCODONE AND ACETAMINOPHEN 7.5; 325 MG/1; MG/1
1 TABLET ORAL EVERY 4 HOURS PRN
Status: DISCONTINUED | OUTPATIENT
Start: 2019-10-17 | End: 2019-10-17 | Stop reason: HOSPADM

## 2019-10-17 RX ORDER — OXYCODONE AND ACETAMINOPHEN 7.5; 325 MG/1; MG/1
1 TABLET ORAL EVERY 4 HOURS PRN
Qty: 12 TABLET | Refills: 0 | Status: SHIPPED | OUTPATIENT
Start: 2019-10-17 | End: 2019-10-19

## 2019-10-17 RX ORDER — NICOTINE 21 MG/24HR
1 PATCH, TRANSDERMAL 24 HOURS TRANSDERMAL
Start: 2019-10-18

## 2019-10-17 RX ORDER — BENZONATATE 200 MG/1
200 CAPSULE ORAL 3 TIMES DAILY PRN
Start: 2019-10-17

## 2019-10-17 RX ADMIN — HYDROCODONE BITARTRATE AND ACETAMINOPHEN 1 TABLET: 7.5; 325 TABLET ORAL at 05:35

## 2019-10-17 RX ADMIN — BUDESONIDE 0.5 MG: 0.5 SUSPENSION RESPIRATORY (INHALATION) at 07:14

## 2019-10-17 RX ADMIN — OXYCODONE HYDROCHLORIDE AND ACETAMINOPHEN 1 TABLET: 7.5; 325 TABLET ORAL at 17:20

## 2019-10-17 RX ADMIN — LISINOPRIL 5 MG: 5 TABLET ORAL at 09:29

## 2019-10-17 RX ADMIN — OXYCODONE HYDROCHLORIDE AND ACETAMINOPHEN 1 TABLET: 7.5; 325 TABLET ORAL at 13:22

## 2019-10-17 RX ADMIN — GABAPENTIN 300 MG: 300 CAPSULE ORAL at 15:16

## 2019-10-17 RX ADMIN — DOXYCYCLINE 100 MG: 100 CAPSULE ORAL at 09:29

## 2019-10-17 RX ADMIN — IPRATROPIUM BROMIDE AND ALBUTEROL SULFATE 3 ML: .5; 3 SOLUTION RESPIRATORY (INHALATION) at 11:35

## 2019-10-17 RX ADMIN — APIXABAN 2.5 MG: 2.5 TABLET, FILM COATED ORAL at 09:29

## 2019-10-17 RX ADMIN — PANTOPRAZOLE SODIUM 40 MG: 40 TABLET, DELAYED RELEASE ORAL at 05:36

## 2019-10-17 RX ADMIN — LORAZEPAM 0.5 MG: 0.5 TABLET ORAL at 13:32

## 2019-10-17 RX ADMIN — DOCUSATE SODIUM 100 MG: 100 CAPSULE, LIQUID FILLED ORAL at 09:29

## 2019-10-17 RX ADMIN — HYDROMORPHONE HYDROCHLORIDE 1 MG: 1 INJECTION, SOLUTION INTRAMUSCULAR; INTRAVENOUS; SUBCUTANEOUS at 01:01

## 2019-10-17 RX ADMIN — IPRATROPIUM BROMIDE AND ALBUTEROL SULFATE 3 ML: .5; 3 SOLUTION RESPIRATORY (INHALATION) at 07:14

## 2019-10-17 RX ADMIN — Medication 250 MG: at 09:28

## 2019-10-17 RX ADMIN — POLYETHYLENE GLYCOL 3350 17 G: 17 POWDER, FOR SOLUTION ORAL at 09:28

## 2019-10-17 RX ADMIN — BUSPIRONE HYDROCHLORIDE 5 MG: 5 TABLET ORAL at 15:16

## 2019-10-17 RX ADMIN — GUAIFENESIN 1200 MG: 600 TABLET, EXTENDED RELEASE ORAL at 09:28

## 2019-10-17 RX ADMIN — OXYCODONE HYDROCHLORIDE AND ACETAMINOPHEN 1 TABLET: 7.5; 325 TABLET ORAL at 09:28

## 2019-10-17 RX ADMIN — ARFORMOTEROL TARTRATE 15 MCG: 15 SOLUTION RESPIRATORY (INHALATION) at 07:25

## 2019-10-17 RX ADMIN — INFLUENZA A VIRUS A/IDAHO/07/2018 (H1N1) ANTIGEN (MDCK CELL DERIVED, PROPIOLACTONE INACTIVATED, INFLUENZA A VIRUS A/INDIANA/08/2018 (H3N2) ANTIGEN (MDCK CELL DERIVED, PROPIOLACTONE INACTIVATED), INFLUENZA B VIRUS B/SINGAPORE/INFTT-16-0610/2016 ANTIGEN (MDCK CELL DERIVED, PROPIOLACTONE INACTIVATED), INFLUENZA B VIRUS B/IOWA/06/2017 ANTIGEN (MDCK CELL DERIVED, PROPIOLACTONE INACTIVATED) 0.5 ML: 15; 15; 15; 15 INJECTION, SUSPENSION INTRAMUSCULAR at 16:40

## 2019-10-17 RX ADMIN — METFORMIN HYDROCHLORIDE 500 MG: 500 TABLET ORAL at 09:29

## 2019-10-17 RX ADMIN — BUSPIRONE HYDROCHLORIDE 5 MG: 5 TABLET ORAL at 09:29

## 2019-10-17 RX ADMIN — CITALOPRAM HYDROBROMIDE 30 MG: 20 TABLET ORAL at 09:29

## 2019-10-17 RX ADMIN — NICOTINE 1 PATCH: 14 PATCH TRANSDERMAL at 09:28

## 2019-10-17 RX ADMIN — GABAPENTIN 300 MG: 300 CAPSULE ORAL at 09:29

## 2019-11-20 ENCOUNTER — OFFICE VISIT (OUTPATIENT)
Dept: ORTHOPEDIC SURGERY | Facility: CLINIC | Age: 59
End: 2019-11-20

## 2019-11-20 VITALS — BODY MASS INDEX: 32.65 KG/M2 | HEIGHT: 67 IN | WEIGHT: 208 LBS

## 2019-11-20 DIAGNOSIS — Z09 STATUS POST ORTHOPEDIC SURGERY, FOLLOW-UP EXAM: ICD-10-CM

## 2019-11-20 DIAGNOSIS — S72.402A CLOSED FRACTURE OF DISTAL END OF LEFT FEMUR, UNSPECIFIED FRACTURE MORPHOLOGY, INITIAL ENCOUNTER (HCC): Primary | ICD-10-CM

## 2019-11-20 PROCEDURE — 99024 POSTOP FOLLOW-UP VISIT: CPT | Performed by: ORTHOPAEDIC SURGERY

## 2019-11-20 RX ORDER — LISINOPRIL 10 MG/1
TABLET ORAL
COMMUNITY
Start: 2019-10-22

## 2019-11-20 RX ORDER — BUSPIRONE HYDROCHLORIDE 7.5 MG/1
10 TABLET ORAL
COMMUNITY
Start: 2019-11-13 | End: 2020-02-12 | Stop reason: ALTCHOICE

## 2019-11-20 RX ORDER — GABAPENTIN 100 MG/1
CAPSULE ORAL
COMMUNITY
Start: 2019-11-15

## 2019-11-20 RX ORDER — OXYCODONE AND ACETAMINOPHEN 7.5; 325 MG/1; MG/1
TABLET ORAL
COMMUNITY
Start: 2019-11-07

## 2019-11-20 NOTE — PROGRESS NOTES
Subjective: Status post IM rodding left femur     Patient ID: Mariangel Thomas is a 59 y.o. female.    Chief Complaint:    History of Present Illness additional patient seen follow-up to the retrograde nailing of the left femur.  Patient was sent without x-rays.       Social History     Occupational History   • Not on file   Tobacco Use   • Smoking status: Current Every Day Smoker     Packs/day: 0.50     Years: 20.00     Pack years: 10.00     Types: Cigarettes   • Smokeless tobacco: Never Used   Substance and Sexual Activity   • Alcohol use: No   • Drug use: No   • Sexual activity: Defer      Review of Systems   Constitutional: Negative for chills, diaphoresis, fever and unexpected weight change.   HENT: Negative for hearing loss, nosebleeds, sore throat and tinnitus.    Eyes: Negative for pain and visual disturbance.   Respiratory: Negative for cough, shortness of breath and wheezing.    Cardiovascular: Negative for chest pain and palpitations.   Gastrointestinal: Negative for abdominal pain, diarrhea, nausea and vomiting.   Endocrine: Negative for cold intolerance, heat intolerance and polydipsia.   Genitourinary: Negative for difficulty urinating, dysuria and hematuria.   Musculoskeletal: Positive for arthralgias and myalgias. Negative for joint swelling.   Skin: Negative for rash and wound.   Allergic/Immunologic: Negative for environmental allergies.   Neurological: Negative for dizziness, syncope and numbness.   Hematological: Does not bruise/bleed easily.   Psychiatric/Behavioral: Negative for dysphoric mood and sleep disturbance. The patient is not nervous/anxious.          Past Medical History:   Diagnosis Date   • Anxiety    • Chronic respiratory failure (CMS/HCC)    • COPD (chronic obstructive pulmonary disease) (CMS/HCC)    • Depression    • Disc disorder     lumbar/thoracic   • Fracture of hip (CMS/HCC)     sched total hip revision   • GERD (gastroesophageal reflux disease)    • Hypertension    •  Incontinent of urine     incontinent of bowel & bladder   • Insomnia    • Kidney failure    • Lumbosacral disc disease    • Muscle weakness     generalized   • Neuropathy    • OA (osteoarthritis)    • Psychosis (CMS/HCC)    • Severe sepsis without septic shock (CMS/HCC)      Past Surgical History:   Procedure Laterality Date   •  SECTION     • CHOLECYSTECTOMY     • FEMUR IM NAILING RETROGRADE Left 10/15/2019    Procedure: FEMUR INTRAMEDULLARY NAILING RETROGRADE;  Surgeon: Piter Chavez MD;  Location:  [facility];  Service: Orthopedics   • TOTAL HIP ARTHROPLASTY Left 2015   • TOTAL HIP ARTHROPLASTY Left 2017    Procedure: TOTAL HIP ARTHROPLASTY REVISION, depuy & baljeet, cultures taken, 7fr hemovac placement;  Surgeon: Piter Chavze MD;  Location:  [facility];  Service:    • TUBAL ABDOMINAL LIGATION       History reviewed. No pertinent family history.      Objective:  There were no vitals filed for this visit.      19  1538   Weight: 94.3 kg (208 lb)     Body mass index is 32.58 kg/m².        Ortho Exam   Her wound is benign.    Assessment:      No diagnosis found.       Plan: She should have an x-ray done of the left femur soon as possible to have that x-ray via disc sent to me to review what I can then determine her weightbearing status.  I do want to see her in 4 weeks and I want a repeat x-ray done at that time.  But for the time being have an x-ray done ASAP and sent to me for review            Work Status:    ITZEL query complete.    Orders:  No orders of the defined types were placed in this encounter.      Medications:  No orders of the defined types were placed in this encounter.      Followup:  No Follow-up on file.          Dictated utilizing Dragon dictation

## 2019-12-23 ENCOUNTER — OFFICE VISIT (OUTPATIENT)
Dept: ORTHOPEDIC SURGERY | Facility: CLINIC | Age: 59
End: 2019-12-23

## 2019-12-23 DIAGNOSIS — Z09 STATUS POST ORTHOPEDIC SURGERY, FOLLOW-UP EXAM: ICD-10-CM

## 2019-12-23 DIAGNOSIS — S72.402A CLOSED FRACTURE OF DISTAL END OF LEFT FEMUR, UNSPECIFIED FRACTURE MORPHOLOGY, INITIAL ENCOUNTER (HCC): Primary | ICD-10-CM

## 2019-12-23 PROCEDURE — 99024 POSTOP FOLLOW-UP VISIT: CPT | Performed by: ORTHOPAEDIC SURGERY

## 2019-12-23 NOTE — PROGRESS NOTES
Subjective: Status post retrograde nailing left distal femur fracture     Patient ID: Mariangel Thomas is a 59 y.o. female.    Chief Complaint:    History of Present Illness patient is 2 months out and according to the patient she has not been weightbearing.  X-ray sent with her from a month ago show excellent position of the fracture and alignment.  Not experiencing any pain.  Mariangel Thomas       Social History     Occupational History   • Not on file   Tobacco Use   • Smoking status: Current Every Day Smoker     Packs/day: 0.50     Years: 20.00     Pack years: 10.00     Types: Cigarettes   • Smokeless tobacco: Never Used   Substance and Sexual Activity   • Alcohol use: No   • Drug use: No   • Sexual activity: Defer      Past Medical History:   Diagnosis Date   • Anxiety    • Chronic respiratory failure (CMS/[facility])    • COPD (chronic obstructive pulmonary disease) (CMS/[facility])    • Depression    • Disc disorder     lumbar/thoracic   • Fracture of hip (CMS/[facility])     sched total hip revision   • GERD (gastroesophageal reflux disease)    • Hypertension    • Incontinent of urine     incontinent of bowel & bladder   • Insomnia    • Kidney failure    • Lumbosacral disc disease    • Muscle weakness     generalized   • Neuropathy    • OA (osteoarthritis)    • Psychosis (CMS/[facility])    • Severe sepsis without septic shock (CMS/[facility])      Past Surgical History:   Procedure Laterality Date   •  SECTION     • CHOLECYSTECTOMY     • FEMUR IM NAILING RETROGRADE Left 10/15/2019    Procedure: FEMUR INTRAMEDULLARY NAILING RETROGRADE;  Surgeon: Piter Chavez MD;  Location:  [facility] OR;  Service: Orthopedics   • TOTAL HIP ARTHROPLASTY Left 2015   • TOTAL HIP ARTHROPLASTY Left 2017    Procedure: TOTAL HIP ARTHROPLASTY REVISION, depuy & baljeet, cultures taken, 7fr hemovac placement;  Surgeon: Piter Chavez MD;  Location: [facility] OR;  Service:    • TUBAL ABDOMINAL LIGATION         No family history on file.      Review of Systems    Constitutional: Negative for chills, diaphoresis, fever and unexpected weight change.   HENT: Negative for hearing loss, nosebleeds, sore throat and tinnitus.    Eyes: Negative for pain and visual disturbance.   Respiratory: Negative for cough, shortness of breath and wheezing.    Cardiovascular: Negative for chest pain and palpitations.   Gastrointestinal: Negative for abdominal pain, diarrhea, nausea and vomiting.   Endocrine: Negative for cold intolerance, heat intolerance and polydipsia.   Genitourinary: Negative for difficulty urinating, dysuria and hematuria.   Musculoskeletal: Negative for arthralgias, joint swelling and myalgias.   Skin: Negative for rash and wound.   Allergic/Immunologic: Negative for environmental allergies.   Neurological: Negative for dizziness, syncope and numbness.   Hematological: Does not bruise/bleed easily.   Psychiatric/Behavioral: Negative for dysphoric mood and sleep disturbance. The patient is not nervous/anxious.            Objective:  There were no vitals filed for this visit.  There were no vitals filed for this visit.  There is no height or weight on file to calculate BMI.      Ortho Exam   X-rays AP and lateral show anatomic alignment of the fracture.  They were a month old I do not see abundant callus at this time.  AP and lateral of the hip show no change in the total hip arthroplasty previously done.  Again she has a persistent foot drop.  Her wounds are benign.      Imaging: X-rays as noted above.  Abundant callus is not seen at this time.    Assessment:        1. Closed fracture of distal end of left femur, unspecified fracture morphology, initial encounter (CMS/[facility])    2. Status post orthopedic surgery, follow-up exam           Plan: She can to begin to weight-bear as tolerated and that was emphasized and I want physical therapy at the nursing facility to work with her to try at least to bed to chair transfer.  Return in 6 weeks on an x-ray of that left left femur  done 1 day prior to the office visit and sent with the patient.    Orders:  No orders of the defined types were placed in this encounter.        I ordered and reviewed the ITZEL today.     Dictated utilizing Dragon dictation

## 2020-02-12 ENCOUNTER — OFFICE VISIT (OUTPATIENT)
Dept: ORTHOPEDIC SURGERY | Facility: CLINIC | Age: 60
End: 2020-02-12

## 2020-02-12 VITALS — BODY MASS INDEX: 29.03 KG/M2 | WEIGHT: 185 LBS | HEIGHT: 67 IN

## 2020-02-12 DIAGNOSIS — S72.402A CLOSED FRACTURE OF DISTAL END OF LEFT FEMUR, UNSPECIFIED FRACTURE MORPHOLOGY, INITIAL ENCOUNTER (HCC): ICD-10-CM

## 2020-02-12 DIAGNOSIS — Z09 STATUS POST ORTHOPEDIC SURGERY, FOLLOW-UP EXAM: ICD-10-CM

## 2020-02-12 DIAGNOSIS — R52 PAIN: Primary | ICD-10-CM

## 2020-02-12 PROCEDURE — 99213 OFFICE O/P EST LOW 20 MIN: CPT | Performed by: ORTHOPAEDIC SURGERY

## 2020-02-12 PROCEDURE — 73552 X-RAY EXAM OF FEMUR 2/>: CPT | Performed by: ORTHOPAEDIC SURGERY

## 2020-02-12 RX ORDER — BUSPIRONE HYDROCHLORIDE 10 MG/1
TABLET ORAL
COMMUNITY
Start: 2020-01-21

## 2020-02-12 NOTE — PROGRESS NOTES
Subjective: Status post retrograde nailing left distal femur fracture     Patient ID: Mariangel Thomas is a 59 y.o. female.    Chief Complaint:    History of Present Illness 59-year-old female is now over 4months and is slowly progressing.  She states she does put some weight in that way although she does have some occasional discomfort in the knee and also in the hip.       Social History     Occupational History   • Not on file   Tobacco Use   • Smoking status: Current Every Day Smoker     Packs/day: 0.50     Years: 20.00     Pack years: 10.00     Types: Cigarettes   • Smokeless tobacco: Never Used   Substance and Sexual Activity   • Alcohol use: No   • Drug use: No   • Sexual activity: Defer      Review of Systems   Constitutional: Negative for chills, diaphoresis, fever and unexpected weight change.   HENT: Negative for hearing loss, nosebleeds, sore throat and tinnitus.    Eyes: Negative for pain and visual disturbance.   Respiratory: Negative for cough, shortness of breath and wheezing.    Cardiovascular: Negative for chest pain and palpitations.   Gastrointestinal: Negative for abdominal pain, diarrhea, nausea and vomiting.   Endocrine: Negative for cold intolerance, heat intolerance and polydipsia.   Genitourinary: Negative for difficulty urinating, dysuria and hematuria.   Musculoskeletal: Positive for arthralgias and myalgias. Negative for joint swelling.   Skin: Negative for rash and wound.   Allergic/Immunologic: Negative for environmental allergies.   Neurological: Negative for dizziness, syncope and numbness.   Hematological: Does not bruise/bleed easily.   Psychiatric/Behavioral: Negative for dysphoric mood and sleep disturbance. The patient is not nervous/anxious.          Past Medical History:   Diagnosis Date   • Anxiety    • Chronic respiratory failure (CMS/[facility])    • COPD (chronic obstructive pulmonary disease) (CMS/[facility])    • Depression    • Disc disorder     lumbar/thoracic   • Fracture of hip  (CMS/[facility])     sched total hip revision   • GERD (gastroesophageal reflux disease)    • Hypertension    • Incontinent of urine     incontinent of bowel & bladder   • Insomnia    • Kidney failure    • Lumbosacral disc disease    • Muscle weakness     generalized   • Neuropathy    • OA (osteoarthritis)    • Psychosis (CMS/[facility])    • Severe sepsis without septic shock (CMS/[facility])      Past Surgical History:   Procedure Laterality Date   •  SECTION     • CHOLECYSTECTOMY     • FEMUR IM NAILING RETROGRADE Left 10/15/2019    Procedure: FEMUR INTRAMEDULLARY NAILING RETROGRADE;  Surgeon: Piter Chavez MD;  Location:  [facility] OR;  Service: Orthopedics   • TOTAL HIP ARTHROPLASTY Left 2015   • TOTAL HIP ARTHROPLASTY Left 2017    Procedure: TOTAL HIP ARTHROPLASTY REVISION, depuy & baljeet, cultures taken, 7fr hemovac placement;  Surgeon: Piter Chavez MD;  Location:  [facility] OR;  Service:    • TUBAL ABDOMINAL LIGATION       History reviewed. No pertinent family history.      Objective:  There were no vitals filed for this visit.      20  1349   Weight: 83.9 kg (185 lb)     Body mass index is 28.98 kg/m².        Ortho Exam   Reviewed the x-ray sent from the nursing facility did not really show the fracture site released I cannot see it on the x-ray.  Does show the x-ray of the hip which shows no change in the position.  I x-rayed her femur in the office today AP and lateral and does show increasing callus at the fracture site with anatomic alignment.  She is alert and oriented x3.  There is no swelling noted.  She has a persistent left foot drop that she has had since the hip surgery and according to the patient she has not gotten her AFO although that is been ordered.  She has decreased range of motion of the knee she cannot fully extend his flexion past 90 degrees and there is no instability.  Her calf is nontender.  Good distal pulses skin is cool to touch    Assessment:        1. Pain    2. Closed fracture of  distal end of left femur, unspecified fracture morphology, initial encounter (CMS/[facility])    3. Status post orthopedic surgery, follow-up exam           Plan: She should continue weightbearing as tolerated on that left leg.  Again the AFO on that left foot would assist with ambulation.  Return to see me in 3 months again x-rays of her left knee done 1 day prior to that visit and sent with the patient.  Answered all questions.            Work Status:    [email or web address] query complete.    Orders:  Orders Placed This Encounter   Procedures   • XR Femur 2 View Left       Medications:  No orders of the defined types were placed in this encounter.      Followup:  Return in about 3 months (around 5/12/2020).          Dictated utilizing Dragon dictation

## 2020-03-11 ENCOUNTER — TELEPHONE (OUTPATIENT)
Dept: ORTHOPEDIC SURGERY | Facility: CLINIC | Age: 60
End: 2020-03-11

## 2020-03-11 NOTE — TELEPHONE ENCOUNTER
Nursing home  was not in office. Staff is leaving a message for her to call back and reschedule due to [facility] being out of town on 5.20.2020

## 2024-08-23 NOTE — PROGRESS NOTES
Acute Care - Occupational Therapy Initial Evaluation   Marlene De La Garza     Patient Name: Mariangel Thomas  : 1960  MRN: [id number]  Today's Date: 2017  Onset of Illness/Injury or Date of Surgery Date: 17  Date of Referral to OT: 17  Referring Physician: Dr. Chavez    Admit Date: 2017       ICD-10-CM ICD-9-CM   1. 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 718.65     Patient Active Problem List   Diagnosis   • Failed total hip arthroplasty   • Acetabular protrusion     Past Medical History:   Diagnosis Date   • Anxiety    • Chronic respiratory failure    • COPD (chronic obstructive pulmonary disease)    • Disc disorder     lumbar/thoracic   • Fracture of hip     sched total hip revision   • GERD (gastroesophageal reflux disease)    • Incontinent of urine     incontinent of bowel & bladder   • Insomnia    • Kidney failure    • Lumbosacral disc disease    • Muscle weakness     generalized   • Neuropathy    • OA (osteoarthritis)    • Psychosis    • Severe sepsis without septic shock      Past Surgical History:   Procedure Laterality Date   •  SECTION     • CHOLECYSTECTOMY     • TOTAL HIP ARTHROPLASTY Left 2015   • TUBAL ABDOMINAL LIGATION            OT ASSESSMENT FLOWSHEET (last 72 hours)      OT Evaluation       17 0957       Document Type evaluation  -JJ (r) ATUL (t) JJ (c)    Subjective Information agree to therapy;complains of;pain  -JJ (r) ATUL (t) JJ (c)    Evaluation Not Performed     Patient Effort, Rehab Treatment adequate  -JJ (r) ATUL (t) JJ (c)    Symptoms Noted During/After Treatment increased pain  -JJ (r) ATUL (t) JJ (c)       Patient Profile Review yes  -JJ (r) ATUL (t) JJ (c)    Onset of Illness/Injury or Date of Surgery Date 17  -JJ (r) ATUL (t) JJ (c)    Referring Physician Dr. Chavez  -ANA MARIAJ (r) ATUL (t) JJ (c)    General Observations Patient supine with HOB elevated and ABD pillow between LEs. Nursing present.  -JJ    Pertinent History Of Current Problem Patient with JIM in 2015.  Yes that's fine. Thanks.    Patient c/o Left hip pain after JIM. X-rays showed protrusion of acetabulum into pelvis. Pt now s/p JIM revision. Pt states that she performed sit-pivot transfers to/from wheelchair. Pt does not perform functional mobility. Pt states that she can propel herself in wheelchair and bed mobility is not an issue. Patient states that she is dependent with all ADL activties.  -MUKESH (tiffanie) ATUL (t) MUKESH (c)    Precautions/Limitations hip precautions- left   Pt unable to recall hip precations.  -MUKESH (tiffanie) ATUL (t) MUKESH (c)    Prior Level of Function --   see history  -MUKESH (tiffanie) ATUL (t) MUKESH (c)    Equipment Currently Used at Home wheelchair;hospital bed  -MUKESH (tiffanie) ATUL (t) MUKESH (c)    Plans/Goals Discussed With patient;agreed upon  -MUKESH (tiffanie) ATUL (t) MUKESH (c)    Risks Reviewed patient:;increased discomfort  -MUKESH (r) ATUL (t) ANA MARIAJ (c)    Benefits Reviewed patient:;improve function;increase independence;increase balance  -MUKESH (tiffanie) ATUL (t) JJ (c)    Barriers to Rehab previous functional deficit;cognitive status  -MUKESH (r) ATUL (t) MUKESH (c)       Lives With facility resident  -MUKESH (tiffanie) ATLU (t) MUKESH (c)    Living Arrangements extended care facility   [facility]  -MUKESH (tiffanie) ATUL (t) MUKESH (c)    Home Accessibility no concerns  -MUKESH (tiffanie) ATUL (t) MUKESH (c)       Date of Referral to OT 04/12/17  -MUKESH (tiffanie) ATUL (t) MUKESH (c)    OT Diagnosis Eval/Tx Left JIM  -MUKESH (r) ATUL (t) JJ (c)    Prognosis fair  -MUKESH (r) ATUL (t) JANA MARIA (c)    Functional Level At Time Of Evaluation Pt states that she is dependent for all ADL's.  -JJ    Impairments Found (describe specific impairments) gait, locomotion, and balance;joint integrity and mobility;motor function;posture  -MUKESH (tiffanie) ATUL (t) JJ (c)    Patient/Family Goals Statement to return back to [facility].  -MUKESH (tiffanie) ATUL (t) MUKESH (c)    Criteria for Skilled Therapeutic Interventions Met yes;treatment indicated  -MUKESH (tiffanie) ATUL bryant (t))    Rehab Potential fair, will monitor progress closely  -MUKESH catherine) ATUL agee) MUKESH bryant)    Therapy Frequency 3 times/wk  -MUKESH catherine) ATUL agee) MUKESH bryant)    Predicted Duration of  Therapy Intervention (days/wks) during Med/Surg stay  -JJ (r)  (t) JJ (c)    Anticipated Discharge Disposition extended care facility  -JJ (r)  (t) JJ (c)       Pain Assessment 0-10  -JJ (r) MH (t) JJ (c)    Pain Score 9  -JJ (r) MH (t) JJ (c)    Post Pain Score 8  -JJ (r) MH (t) JJ (c)    Pain Type Acute pain;Surgical pain  -JJ (r) MH (t) JJ (c)    Pain Location Hip  -JJ (r)  (t) JJ (c)    Pain Orientation Left  -JJ (r)  (t) JJ (c)    Pain Intervention(s) Repositioned  -JJ (r)  (t) JJ (c)    Response to Interventions tolerated  -JJ (r)  (t) JJ (c)       Current Cognitive/Communication Assessment impaired  -J (r)  (t) JJ (c)    Orientation Status oriented to;person;place;time  -JANA MARIA (r)  (t) JJ (c)    Follows Commands/Answers Questions able to follow single-step instructions  -J (r)  (t) JJ (c)    Personal Safety decreased awareness, need for safety;decreased awareness, need for assist;decreased insight to deficits  -J (r)  (t) JJ (c)    Personal Safety Interventions gait belt;fall prevention program maintained;nonskid shoes/slippers when out of bed;supervised activity  -JANA MARIA (r)  (t) JJ (c)       General ROM Detail BUE ROM WFL  -J (r)  (t) JJ (c)       General MMT Assessment Detail BUE MMT functional   -JJ (r)  (t) JJ (c)       Bed Mobility, Assistive Device bed rails;head of bed elevated;draw sheet  -J (r)  (t) JJ (c)    Bed Mob, Supine to Sit, [place] maximum assist (25% patient effort);2 person assist required;verbal cues required  -JANA MARIA (r)  (t) JJ (c)    Bed Mob, Sit to Supine, [place] dependent (less than 25% patient effort);verbal cues required   3 person assit required  -JANA MARIA (r) ATUL (t) JJ (c)    Bed Mobility, Comment Verbal cues required for sequencing/safety techniques  -MUKESH (tiffanie) ATUL (jason) MUKESH (c)       Transfers, Sit-Stand [place] maximum assist (25% patient effort);2 person assist required;verbal cues required  -MUKESH (tiffanie) ATUL (jason) MUKESH (c)    Transfers, Stand-Sit  [place] maximum assist (25% patient effort);2 person assist required;verbal cues required  -JJ (r) MH (t) JJ (c)    Transfers, Sit-Stand-Sit, Assist Device standard walker  -JJ (r) MH (t) JJ (c)    Transfer, Comment vc's required for hand placement. Elevated surface required for transfers. Pt performed sit-stand transfers x4.   -JJ (r) MH (t) JJ (c)       UB Bathing Assess/Train, [place] Level dependent (less than 25% patient effort)  -JJ (r) MH (t) JJ (c)    UB Bathing Assess/Train, Comment Pt appears capable of performing upper body adl activity independently after set up assistance, yet pt states she is dependent for ADL's at [facility]  -JJ (r) MH (t) JJ (c)       LB Bathing Assess/Train, [place] Level dependent (less than 25% patient effort)  -JJ (r) MH (t) JJ (c)    LB Bathing Assess/Train, Comment Pt states she is dependent for ADL's  -JJ (r) MH (t) JJ (c)       UB Dressing Assess/Train, [place] dependent (less than 25% patient effort)  -JJ (r) MH (t) JJ (c)    UB Dressing Assess/Train, Comment Pt appears capable of performing upper body adl activity independently after set up assistance, yet pt states she is dependent for ADL's at [facility]  -JJ (r) MH (t) JJ (c)       LB Dressing Assess/Train, [place] dependent (less than 25% patient effort)  -JJ (r) MH (t) JJ (c)    LB Dressing Assess/Train, Comment Pt states she is dependent for ADL's  -JJ (r) MH (t) JJ (c)       Toileting Assess/Train, Comment Pt using briefs/dependent for toileting hygiene/brief management.  -JJ (r) MH (t) JJ (c)       Grooming Assess/Train, Indepen Level dependent (less than 25% patient effort)  -JJ (r) MH (t) JJ (c)    Grooming Assess/Train, Comment Pt appears capable of performing upper body adl activity independently after set up assistance, yet pt states she is dependent for ADL's at E[facility]  -JJ (r) MH (t) JJ (c)       Sensory Impairment        Planned Therapy Interventions balance training;transfer training  -MUKESH        Pre-Treatment Position in bed  -JJ (r) MH (t) JJ (c)    Post Treatment Position bed  -JJ (r) MH (t) JJ (c)    In Bed supine;call light within reach;encouraged to call for assist;ABD pillow   with CNA  -JJ (r) MH (t) JJ (c)    Initials Name Effective Dates    EN Daniella Statonhortensia, OTR 06/22/16 -     JA Satish Munguia, RN 02/24/17 -     SD Mukul Shi, OTR 06/22/16 -     EI Jayjay Chavez, RN 06/16/16 -     JANA MARIA Handy, OTR 06/22/16 -     BP Rae Mckinney, PT 12/01/15 -     CR Silvia Patterson, RN 06/16/16 -     CT Arianna Marrero, JAYDE 06/16/16 -     HIMA Steven, RN 11/10/16 -     ATUL Gupta, OT Student 02/14/17 -            Occupational Therapy Education     Title: PT OT SLP Therapies (Active)     Topic: Occupational Therapy (Done)     Point: ADL training (Done)    Description: Instruct learner(s) on proper safety adaptation and remediation techniques during self care or transfers.   Instruct in proper use of assistive devices.    Learning Progress Summary    Learner Readiness Method Response Comment Documented by Status   Patient Acceptance E VU Education provided on OT services. Pt educated on hip precautions and safety techniques with functional transfers.  04/13/17 1223 Done               Point: Precautions (Done)    Description: Instruct learner(s) on prescribed precautions during self-care and functional transfers.    Learning Progress Summary    Learner Readiness Method Response Comment Documented by Status   Patient Acceptance E VU Education provided on OT services. Pt educated on hip precautions and safety techniques with functional transfers.  04/13/17 1223 Done                      User Key     Initials Effective Dates Name Provider Type Discipline     02/14/17 -  Izabel Gupta, OT Student OT Student OT                  OT Recommendation and Plan  Anticipated Discharge Disposition: extended care facility  Planned Therapy Interventions: balance training,  transfer training  Therapy Frequency: 3 times/wk  Plan of Care Review  Plan Of Care Reviewed With: patient  Progress: progress toward functional goals is gradual  Outcome Summary/Follow up Plan: OT evaluation complete: Patient requires Max A x 3 for supine to sit transfer and sit to supine dependent x3. Pt able to perform sit-stand transfer with Max A x 2. Patient requires Max A x 2 for static standing. Patient dependent for brief management and hygiene. Patient states that she is dependent with all ADL's. Patient unable to recall/adhere to hip precautions during functional activities.          OT Goals       04/13/17 1224          Static Standing Balance OT STG    Static Standing Balance OT STG, Date Established 04/13/17  -JJ (r) MH (t) JJ (c)      Static Standing Balance OT STG, Time to Achieve 3 days  -JJ (r) MH (t) JJ (c)      Static Standing Balance OT STG, [place] Level moderate assist (50% patient effort);2 person assist required  -JJ (r) MH (t) JJ (c)      Static Standing Balance OT STG, Assist Device --   to allow for caegiver assist during ADL activities  -JJ (r) MH (t) JJ (c)      Patient Education OT STG    Patient Education OT STG, Date Established 04/13/17  -JJ (r) MH (t) JJ (c)      Patient Education OT STG, Time to Achieve 2 - 3 days  -JJ (r) MH (t) JJ (c)      Patient Education OT STG, Education Type precautions per surgeon;posture/body mechanics   hip precautions to prevent dislocation.  -JJ (r) MH (t) JJ (c)      Patient Education OT STG, Education Understanding verbalizes understanding;demonstrates adequately  -JJ (r) MH (t) JJ (c)        User Key  (r) = Recorded By, (t) = Taken By, (c) = Cosigned By    Initials Name Provider Type    MUKESH Handy OTR Occupational Therapist    ATUL Gupta, OT Student OT Student                Outcome Measures       04/13/17 1200 04/13/17 0957 04/13/17 0936    How much help from another person do you currently need...    Turning from your  back to your side while in flat bed without using bedrails?   1  -BP    Moving from lying on back to sitting on the side of a flat bed without bedrails?   1  -BP    Moving to and from a bed to a chair (including a wheelchair)?   1  -BP    Standing up from a chair using your arms (e.g., wheelchair, bedside chair)?   1  -BP    Climbing 3-5 steps with a railing?   1  -BP    To walk in hospital room?   1  -BP    AM-PAC 6 Clicks Score   6  -BP    How much help from another is currently needed...    Putting on and taking off regular lower body clothing?  1  -JJ (r) MH (t) JJ (c)     Bathing (including washing, rinsing, and drying)  1  -JJ (r) MH (t) JJ (c)     Toileting (which includes using toilet bed pan or urinal)  1  -JJ (r) MH (t) JJ (c)     Putting on and taking off regular upper body clothing  1  -JJ (r) MH (t) JJ (c)     Taking care of personal grooming (such as brushing teeth)  1  -JJ (r) MH (t) JJ (c)     Eating meals  4  -JJ (r) MH (t) JJ (c)     Score  9  -JJ (r) MH (t)     Functional Assessment    Outcome Measure Options --  -JJ (r) MH (t) JJ (c) AM-PAC 6 Clicks Daily Activity (OT)  -JJ (r) MH (t) JJ (c) AM-PAC 6 Clicks Basic Mobility (PT)  -BP      User Key  (r) = Recorded By, (t) = Taken By, (c) = Cosigned By    Initials Name Provider Type    JANA MARIA Handy, OTR Occupational Therapist    BP Rae Mckinney, PT Physical Therapist     Izabel Gupta, OT Student OT Student          Time Calculation:   OT Start Time: 0936    Therapy Charges for Today     Code Description Service Date Service Provider Modifiers Qty    [id number]  OT EVAL MOD COMPLEXITY 2 4/13/2017 Izabel Gupta, OT Student GO 1               Izabel Gupta OT Student  4/13/2017

## (undated) DEVICE — SUT VIC TP1 SZ2 27IN J849G

## (undated) DEVICE — TOWEL,OR,DSP,ST,BLUE,STD,4/PK,20PK/CS: Brand: MEDLINE

## (undated) DEVICE — SPNG LAP 18X18IN LF STRL PK/5

## (undated) DEVICE — GUIDE WIRE, BALL-TIPPED, STERILE

## (undated) DEVICE — INTENDED FOR TISSUE SEPARATION, AND OTHER PROCEDURES THAT REQUIRE A SHARP SURGICAL BLADE TO PUNCTURE OR CUT.: Brand: BARD-PARKER ® DISPOSABLE SCALPELS

## (undated) DEVICE — KT DRN EVAC WND PVC 7F3/32IN 400CC

## (undated) DEVICE — TRY SKINPREP WET CHG 4PCT SPNG HIB

## (undated) DEVICE — K-WIRE, STERILE
Type: IMPLANTABLE DEVICE | Site: FEMUR | Status: NON-FUNCTIONAL
Removed: 2019-10-15

## (undated) DEVICE — SYR CONTRL LUERLOK 10CC

## (undated) DEVICE — Device

## (undated) DEVICE — NDL SPINE 22G 31/2IN BLK

## (undated) DEVICE — LOCKING SCREW, FULLY THREADED
Type: IMPLANTABLE DEVICE | Site: FEMUR | Status: NON-FUNCTIONAL
Removed: 2019-10-15

## (undated) DEVICE — SUT VIC 2/0 CP2 CR8 18IN J762D

## (undated) DEVICE — SYS SKIN CLS DERMABOND PRINEO W/22CM MESH TP

## (undated) DEVICE — DRP Z/FRICTION 10X16IN

## (undated) DEVICE — COVER,MAYO STAND,STERILE: Brand: MEDLINE

## (undated) DEVICE — STCKNT IMPERV 14IN STRL

## (undated) DEVICE — SPONGE,DRAIN,NONWVN,4"X4",6PLY,STRL,LF: Brand: MEDLINE

## (undated) DEVICE — SUT NUROLON 1 OS8 CR3 18IN C595T

## (undated) DEVICE — APPL CHLORAPREP W/TINT 26ML ORNG

## (undated) DEVICE — FRAZIER SUCTION INSTRUMENT 10 FR W/CONTROL VENT & OBTURATOR: Brand: FRAZIER

## (undated) DEVICE — NDL BLNT 18G 1 1/2IN

## (undated) DEVICE — 3M™ IOBAN™ 2 ANTIMICROBIAL INCISE DRAPE 6651EZ: Brand: IOBAN™ 2

## (undated) DEVICE — GLV SURG SENSICARE ORTHO PF LF 8 STRL

## (undated) DEVICE — PROXIMATE RH ROTATING HEAD SKIN STAPLERS (35 WIDE) CONTAINS 35 STAINLESS STEEL STAPLES: Brand: PROXIMATE

## (undated) DEVICE — DRSNG PAD ABD 8X10IN STRL

## (undated) DEVICE — TRANSPOSAL ULTRAFLEX DUO/QUAD ULTRA CART MANIFOLD

## (undated) DEVICE — CONN TBG Y 5 IN 1 LF STRL

## (undated) DEVICE — BOWL PLSTC LG 32OZ BLU STRL

## (undated) DEVICE — CONTAINER,SPECIMEN,OR STERILE,4OZ: Brand: MEDLINE

## (undated) DEVICE — BNDG ELAS ELITE V/CLOSE 6IN 5YD LF STRL

## (undated) DEVICE — SYR LUERLOK 20CC BX/50

## (undated) DEVICE — COVER,TABLE,HEAVY DUTY,79"X110",STRL: Brand: MEDLINE

## (undated) DEVICE — PREP SOL POVIDONE/IODINE BT 4OZ

## (undated) DEVICE — PENCL E/S HNDSWCH PUSHBTN HOLSTR 10FT

## (undated) DEVICE — DRSNG ADAPTIC 3X8

## (undated) DEVICE — GUIDE WIRE, SMOOTH-TIPPED, STERILE
Type: IMPLANTABLE DEVICE | Site: FEMUR | Status: NON-FUNCTIONAL
Removed: 2019-10-15

## (undated) DEVICE — IRRIGATOR BULB 60CC

## (undated) DEVICE — DRILL, AO, STERILE

## (undated) DEVICE — ELECTRD BLD EXT EDGE 1P COAT 6.5IN STRL

## (undated) DEVICE — 450 ML BOTTLE OF 0.05% CHLORHEXIDINE GLUCONATE IN 99.95% STERILE WATER FOR IRRIGATION, USP AND APPLICATOR.: Brand: IRRISEPT ANTIMICROBIAL WOUND LAVAGE

## (undated) DEVICE — SPNG GZ WOVN 4X4IN 12PLY 10/BX STRL

## (undated) DEVICE — DRAPE,REIN 53X77,STERILE: Brand: MEDLINE

## (undated) DEVICE — DRAPE,HIP,W/POUCHES,STERILE: Brand: MEDLINE

## (undated) DEVICE — BANDAGE,GAUZE,BULKEE II,4.5"X4.1YD,STRL: Brand: MEDLINE

## (undated) DEVICE — GLV SURG NEOLON 2G PF LF 8 STRL

## (undated) DEVICE — PAD,ABDOMINAL,8"X10",ST,LF: Brand: MEDLINE

## (undated) DEVICE — DRAPE,U/ SHT,SPLIT,PLAS,STERIL: Brand: MEDLINE

## (undated) DEVICE — 3M™ IOBAN™ 2 ANTIMICROBIAL INCISE DRAPE 6650EZ: Brand: IOBAN™ 2

## (undated) DEVICE — SPNG LAP PREWASH SFTPK 18X18IN 5PK STRL

## (undated) DEVICE — SUT VIC 0 CT1 CR8 18IN J840D

## (undated) DEVICE — INTENDED USE FOR SURGICAL MARKING ON INTACT SKIN, ALSO PROVIDES A PERMANENT METHOD OF IDENTIFYING OBJECTS IN THE OPERATING ROOM: Brand: WRITESITE® REGULAR TIP SKIN MARKER

## (undated) DEVICE — GLV SURG EUDERMIC PF LTX 8 STRL

## (undated) DEVICE — PK BASIC ORTHO 90

## (undated) DEVICE — PILLW ABD SM

## (undated) DEVICE — BNDG ELAS ELITE V/CLOSE 4IN 5YD LF

## (undated) DEVICE — DRSNG TELFA PAD NONADH STR 1S 3X8IN

## (undated) DEVICE — 3M™ STERI-DRAPE™ U-DRAPE 1015: Brand: STERI-DRAPE™

## (undated) DEVICE — HOOD: Brand: FLYTE

## (undated) DEVICE — 6.5MM ROUND SOLID CARBIDE BUR MEDIUM

## (undated) DEVICE — 2108 SERIES SAGITTAL BLADE AGGRESSIVE  (25.0 X 0.89 X 89.5MM)

## (undated) DEVICE — 3M™ MEDIPORE™ H SOFT CLOTH SURGICAL TAPE 2864, 4 INCH X 10 YARD (10CM X 9,14M), 12 ROLLS/CASE: Brand: 3M™ MEDIPORE™

## (undated) DEVICE — DECANTER: Brand: UNBRANDED

## (undated) DEVICE — 3M™ STERI-DRAPE™ INSTRUMENT POUCH 1018: Brand: STERI-DRAPE™

## (undated) DEVICE — REAMER SHAFT, MOD.TRINKLE: Brand: BIXCUT